# Patient Record
Sex: MALE | Race: WHITE | NOT HISPANIC OR LATINO
[De-identification: names, ages, dates, MRNs, and addresses within clinical notes are randomized per-mention and may not be internally consistent; named-entity substitution may affect disease eponyms.]

---

## 2019-07-22 ENCOUNTER — APPOINTMENT (OUTPATIENT)
Dept: CARDIOLOGY | Facility: CLINIC | Age: 84
End: 2019-07-22
Payer: MEDICARE

## 2019-07-22 PROCEDURE — 99213 OFFICE O/P EST LOW 20 MIN: CPT | Mod: 25

## 2019-07-22 PROCEDURE — 93284 PRGRMG EVAL IMPLANTABLE DFB: CPT | Mod: 59

## 2019-07-22 PROCEDURE — 93290 INTERROG DEV EVAL ICPMS IP: CPT | Mod: 59

## 2019-10-28 ENCOUNTER — APPOINTMENT (OUTPATIENT)
Dept: CARDIOLOGY | Facility: CLINIC | Age: 84
End: 2019-10-28
Payer: MEDICARE

## 2019-10-28 PROCEDURE — 99213 OFFICE O/P EST LOW 20 MIN: CPT | Mod: 25

## 2019-10-28 PROCEDURE — 93290 INTERROG DEV EVAL ICPMS IP: CPT | Mod: 59

## 2019-10-28 PROCEDURE — 93284 PRGRMG EVAL IMPLANTABLE DFB: CPT | Mod: 59

## 2020-02-24 ENCOUNTER — APPOINTMENT (OUTPATIENT)
Dept: CARDIOLOGY | Facility: CLINIC | Age: 85
End: 2020-02-24
Payer: MEDICARE

## 2020-02-24 PROCEDURE — 93284 PRGRMG EVAL IMPLANTABLE DFB: CPT | Mod: 59

## 2020-02-24 PROCEDURE — 99213 OFFICE O/P EST LOW 20 MIN: CPT | Mod: 25

## 2020-02-24 PROCEDURE — 93290 INTERROG DEV EVAL ICPMS IP: CPT | Mod: 59

## 2020-07-23 ENCOUNTER — RECORD ABSTRACTING (OUTPATIENT)
Age: 85
End: 2020-07-23

## 2020-08-05 ENCOUNTER — APPOINTMENT (OUTPATIENT)
Dept: CARDIOLOGY | Facility: CLINIC | Age: 85
End: 2020-08-05
Payer: MEDICARE

## 2020-08-05 VITALS
BODY MASS INDEX: 24.87 KG/M2 | DIASTOLIC BLOOD PRESSURE: 60 MMHG | HEART RATE: 76 BPM | WEIGHT: 200 LBS | SYSTOLIC BLOOD PRESSURE: 140 MMHG | TEMPERATURE: 98.4 F | HEIGHT: 75 IN

## 2020-08-05 DIAGNOSIS — Z86.79 PERSONAL HISTORY OF OTHER DISEASES OF THE CIRCULATORY SYSTEM: ICD-10-CM

## 2020-08-05 DIAGNOSIS — I25.2 OLD MYOCARDIAL INFARCTION: ICD-10-CM

## 2020-08-05 DIAGNOSIS — Z00.00 ENCOUNTER FOR GENERAL ADULT MEDICAL EXAMINATION W/OUT ABNORMAL FINDINGS: ICD-10-CM

## 2020-08-05 DIAGNOSIS — T82.897A OTHER SPECIFIED COMPLICATION OF CARDIAC PROSTHETIC DEVICES, IMPLANTS AND GRAFTS, INITIAL ENCOUNTER: ICD-10-CM

## 2020-08-05 PROCEDURE — 99214 OFFICE O/P EST MOD 30 MIN: CPT

## 2020-08-05 PROCEDURE — 93000 ELECTROCARDIOGRAM COMPLETE: CPT

## 2020-08-05 NOTE — ASSESSMENT
[FreeTextEntry1] : NO EVENTS BATTERY OK\par SKIN SUPERFICIAL LESION\par EKG SR BIV PACING\par PLAN   CONTINUE  SAME MEDS\par REFERRED TO DERMATOLOGIST

## 2020-08-05 NOTE — PROCEDURE
[CRT-D] : Cardiac resynchronization therapy defibrillator [Complete Heart Block] : complete heart block [DDD] : DDD [Longevity: ___ months] : The estimated remaining battery life is [unfilled] months [Lead Imp:  ___ohms] : lead impedance was [unfilled] ohms [___V @] : [unfilled] V [___ ms] : [unfilled] ms [Sensing Amplitude ___mv] : sensing amplitude was [unfilled] mv [Asense-Vpace ___ %] : Asense-Vpace [unfilled]% [Asense-Vsense ___ %] : Asense-Vsense [unfilled]% [Apace-Vsense ___ %] : Apace-Vsense [unfilled]% [Apace-Vpace ___ %] : Apace-Vpace [unfilled]% [de-identified] : MEDTRONIC [de-identified] : VIVA [de-identified] : [id number] [de-identified] : 6-8-16 [de-identified] : no events [de-identified] : 70

## 2020-08-05 NOTE — HISTORY OF PRESENT ILLNESS
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 DENIES ANY DYSPNEA OR CHEST PAIN 6/1/2019\par \par no cardiac c/o   8/5/20\par

## 2020-08-05 NOTE — PHYSICAL EXAM
[General Appearance - Well Developed] : well developed [Normal Appearance] : normal appearance [General Appearance - Well Nourished] : well nourished [No Deformities] : no deformities [Well Groomed] : well groomed [Heart Rate And Rhythm] : heart rate and rhythm were normal [General Appearance - In No Acute Distress] : no acute distress [Murmurs] : no murmurs present [Heart Sounds] : normal S1 and S2 [Respiration, Rhythm And Depth] : normal respiratory rhythm and effort [Auscultation Breath Sounds / Voice Sounds] : lungs were clear to auscultation bilaterally [Exaggerated Use Of Accessory Muscles For Inspiration] : no accessory muscle use [Abdomen Tenderness] : non-tender [Abdomen Soft] : soft [FreeTextEntry1] : superficial abrasion  at l ifraclavicular area as a result of scab [Cyanosis, Localized] : no localized cyanosis [Abdomen Mass (___ Cm)] : no abdominal mass palpated [Nail Clubbing] : no clubbing of the fingernails [Petechial Hemorrhages (___cm)] : no petechial hemorrhages [] : no ischemic changes

## 2020-12-14 ENCOUNTER — APPOINTMENT (OUTPATIENT)
Dept: CARDIOLOGY | Facility: CLINIC | Age: 85
End: 2020-12-14
Payer: MEDICARE

## 2020-12-14 VITALS
SYSTOLIC BLOOD PRESSURE: 120 MMHG | HEART RATE: 70 BPM | WEIGHT: 195 LBS | DIASTOLIC BLOOD PRESSURE: 60 MMHG | BODY MASS INDEX: 24.25 KG/M2 | TEMPERATURE: 98.1 F | HEIGHT: 75 IN

## 2020-12-14 DIAGNOSIS — Z86.79 PERSONAL HISTORY OF OTHER DISEASES OF THE CIRCULATORY SYSTEM: ICD-10-CM

## 2020-12-14 PROCEDURE — 93000 ELECTROCARDIOGRAM COMPLETE: CPT | Mod: 59

## 2020-12-14 PROCEDURE — 93284 PRGRMG EVAL IMPLANTABLE DFB: CPT

## 2020-12-14 PROCEDURE — 99213 OFFICE O/P EST LOW 20 MIN: CPT

## 2020-12-14 PROCEDURE — 93290 INTERROG DEV EVAL ICPMS IP: CPT | Mod: 26

## 2020-12-14 NOTE — PROCEDURE
[Complete Heart Block] : complete heart block [CRT-D] : Cardiac resynchronization therapy defibrillator [DDD] : DDD [Voltage: ___ volts] : Voltage was [unfilled] volts [Charge Time: ___ sec] : charge time was [unfilled] seconds [Longevity: ___ months] : The estimated remaining battery life is [unfilled] months [Sensing Amplitude ___mv] : sensing amplitude was [unfilled] mv [Lead Imp:  ___ohms] : lead impedance was [unfilled] ohms [___V @] : [unfilled] V [___ ms] : [unfilled] ms [Asense-Vsense ___ %] : Asense-Vsense [unfilled]% [Asense-Vpace ___ %] : Asense-Vpace [unfilled]% [Apace-Vsense ___ %] : Apace-Vsense [unfilled]% [Apace-Vpace ___ %] : Apace-Vpace [unfilled]% [See Scanned Paceart Report] : See scanned paceart report [See Device Printout] : See device printout [Programmed for Longevity] : output reprogrammed for improved battery longevity [de-identified] : MEDTRONIC [de-identified] : [id number] [de-identified] : [id number] [de-identified] : 6/8/2016 [de-identified] : 70 [de-identified] : no events\par Stable Optivol

## 2020-12-14 NOTE — ASSESSMENT
[FreeTextEntry1] : Mr. Leslie has a history of CAD s/p CABG, sustained VT on Amio, ICM s/p CRT-D here for routine device follow up. \par \par ICM\par - CRT-D with normal function and no arrhythmias. Stable Optivol. \par - Check 2D echo\par - will enroll in remote with me\par \par History of Sustained VT\par - On Amio. Check routine labs. Ophtho referral. Has a pulmonologist. \par \par CAD \par - Cont GDMT\par \par Skin Lesions\par - Derm referral. \par \par HTN\par - BP well controlled\par - 2g Na diet enforced\par \par I have also advised the patient to go to the nearest emergency room if he experiences any chest pain, dyspnea, syncope, or has any other compelling symptoms.\par \par Follow up in 9 months

## 2020-12-14 NOTE — PHYSICAL EXAM
[General Appearance - Well Developed] : well developed [Normal Appearance] : normal appearance [Well Groomed] : well groomed [General Appearance - Well Nourished] : well nourished [No Deformities] : no deformities [General Appearance - In No Acute Distress] : no acute distress [Heart Rate And Rhythm] : heart rate and rhythm were normal [Heart Sounds] : normal S1 and S2 [Murmurs] : no murmurs present [Edema] : no peripheral edema present [] : no respiratory distress [Respiration, Rhythm And Depth] : normal respiratory rhythm and effort [Exaggerated Use Of Accessory Muscles For Inspiration] : no accessory muscle use [Auscultation Breath Sounds / Voice Sounds] : lungs were clear to auscultation bilaterally [Left Infraclavicular] : left infraclavicular area [Well-Healed] : well-healed [FreeTextEntry2] : with a superficial skin lesion overlying the distal aspect of the incision [Abdomen Soft] : soft [Nail Clubbing] : no clubbing of the fingernails

## 2020-12-14 NOTE — HISTORY OF PRESENT ILLNESS
[de-identified] : \par 89 yo M with history of CAD s/p 4v CABG (2015), PCI, DM, HTN, sustained VT on 3/16/17 s/p DC ICD (3/17/17) for secondary prevention and right CVA (June 2017) who presents for routine follow up of ICD. Of note, pt lost his wife to liver ca in 2019. He has not followed up with dermatologist due to inability to get a ride without his daughter.

## 2020-12-15 LAB
ALBUMIN SERPL ELPH-MCNC: 3.4 G/DL
ALP BLD-CCNC: 54 U/L
ALT SERPL-CCNC: 7 U/L
ANION GAP SERPL CALC-SCNC: 12 MMOL/L
AST SERPL-CCNC: 13 U/L
BILIRUB SERPL-MCNC: 0.7 MG/DL
BUN SERPL-MCNC: 22 MG/DL
CALCIUM SERPL-MCNC: 9.1 MG/DL
CHLORIDE SERPL-SCNC: 106 MMOL/L
CO2 SERPL-SCNC: 22 MMOL/L
CREAT SERPL-MCNC: 1.9 MG/DL
GLUCOSE SERPL-MCNC: 98 MG/DL
POTASSIUM SERPL-SCNC: 4.7 MMOL/L
PROT SERPL-MCNC: 7.4 G/DL
SODIUM SERPL-SCNC: 140 MMOL/L
T4 FREE SERPL-MCNC: 1.1 NG/DL
TSH SERPL-ACNC: 5.24 UIU/ML

## 2020-12-28 ENCOUNTER — APPOINTMENT (OUTPATIENT)
Dept: CARDIOLOGY | Facility: CLINIC | Age: 85
End: 2020-12-28

## 2021-01-01 ENCOUNTER — APPOINTMENT (OUTPATIENT)
Dept: CARDIOLOGY | Facility: CLINIC | Age: 86
End: 2021-01-01

## 2021-01-01 ENCOUNTER — FORM ENCOUNTER (OUTPATIENT)
Age: 86
End: 2021-01-01

## 2021-03-16 ENCOUNTER — APPOINTMENT (OUTPATIENT)
Dept: CARDIOLOGY | Facility: CLINIC | Age: 86
End: 2021-03-16
Payer: MEDICARE

## 2021-03-16 ENCOUNTER — NON-APPOINTMENT (OUTPATIENT)
Age: 86
End: 2021-03-16

## 2021-03-16 PROCEDURE — 93296 REM INTERROG EVL PM/IDS: CPT

## 2021-03-16 PROCEDURE — 93295 DEV INTERROG REMOTE 1/2/MLT: CPT

## 2021-05-05 ENCOUNTER — APPOINTMENT (OUTPATIENT)
Dept: UROLOGY | Facility: CLINIC | Age: 86
End: 2021-05-05
Payer: MEDICARE

## 2021-05-05 VITALS
SYSTOLIC BLOOD PRESSURE: 148 MMHG | TEMPERATURE: 98.2 F | DIASTOLIC BLOOD PRESSURE: 73 MMHG | BODY MASS INDEX: 23.62 KG/M2 | HEART RATE: 70 BPM | HEIGHT: 75 IN | WEIGHT: 190 LBS

## 2021-05-05 DIAGNOSIS — Z80.8 FAMILY HISTORY OF MALIGNANT NEOPLASM OF OTHER ORGANS OR SYSTEMS: ICD-10-CM

## 2021-05-05 DIAGNOSIS — Z82.49 FAMILY HISTORY OF ISCHEMIC HEART DISEASE AND OTHER DISEASES OF THE CIRCULATORY SYSTEM: ICD-10-CM

## 2021-05-05 DIAGNOSIS — R39.9 UNSPECIFIED SYMPTOMS AND SIGNS INVOLVING THE GENITOURINARY SYSTEM: ICD-10-CM

## 2021-05-05 DIAGNOSIS — N13.8 BENIGN PROSTATIC HYPERPLASIA WITH LOWER URINARY TRACT SYMPMS: ICD-10-CM

## 2021-05-05 DIAGNOSIS — N40.1 BENIGN PROSTATIC HYPERPLASIA WITH LOWER URINARY TRACT SYMPMS: ICD-10-CM

## 2021-05-05 PROCEDURE — 99204 OFFICE O/P NEW MOD 45 MIN: CPT

## 2021-05-05 RX ORDER — SIMVASTATIN 10 MG/1
10 TABLET, FILM COATED ORAL
Qty: 30 | Refills: 5 | Status: COMPLETED | COMMUNITY
End: 2021-05-05

## 2021-05-05 NOTE — PHYSICAL EXAM
[General Appearance - Well Developed] : well developed [Normal Appearance] : normal appearance [Well Groomed] : well groomed [General Appearance - In No Acute Distress] : no acute distress [Edema] : no peripheral edema [Respiration, Rhythm And Depth] : normal respiratory rhythm and effort [Exaggerated Use Of Accessory Muscles For Inspiration] : no accessory muscle use [Abdomen Soft] : soft [Abdomen Tenderness] : non-tender [Costovertebral Angle Tenderness] : no ~M costovertebral angle tenderness [Urethral Meatus] : meatus normal [Urinary Bladder Findings] : the bladder was normal on palpation [Scrotum] : the scrotum was normal [Testes Mass (___cm)] : there were no testicular masses [Testes Tenderness] : no tenderness of the testes [Prostate Tenderness] : the prostate was not tender [No Prostate Nodules] : no prostate nodules [Prostate Size ___ gm] : prostate size [unfilled] gm [Normal Station and Gait] : the gait and station were normal for the patient's age [] : no rash [No Focal Deficits] : no focal deficits [Oriented To Time, Place, And Person] : oriented to person, place, and time [Affect] : the affect was normal [Not Anxious] : not anxious [Mood] : the mood was normal [No Palpable Adenopathy] : no palpable adenopathy [FreeTextEntry1] : sulcus+

## 2021-05-05 NOTE — ASSESSMENT
[FreeTextEntry1] : #1. Elevated PSA= 16.1\par #2. BPH, clinically\par \par Plan\par -Discussed PSA testing in this age group including its physiology in relationship to age and BPH. We had knowledge the increased risk for the presence of prostate cancer based on this PSA value. We also discussed the options of MP MRI, prostate biopsy, surveillance PSA or no further intervention. Concerning these choices we discussed the risks, benefits, alternatives, possible complications of each, including progression of undetected prostate carcinoma specifically in his age group.\par -Understanding all of the above the patient and daughter have elected not to proceed in any manner.

## 2021-05-05 NOTE — HISTORY OF PRESENT ILLNESS
[Urinary Frequency] : urinary frequency [Nocturia] : nocturia [Post-Void Dribbling] : post-void dribbling [None] : None [FreeTextEntry1] : 89-year-old male here with his daughter  for initial evaluation regarding elevated PSA of 16.1. Patient was sent by PCP. He reports mild voiding symptoms. He denies hematuria or constitutional symptoms. The patient and daughter have no knowledge of prior PSA testing all values. No family history of prostate carcinoma.\par \par 1/21 PSA= 16.1 [Urinary Urgency] : no urinary urgency [Weak Stream] : no weak stream [Dysuria] : no dysuria [Hematuria - Gross] : no gross hematuria [Fever] : no fever

## 2021-05-05 NOTE — LETTER BODY
[Dear  ___] : Dear  [unfilled], [Consult Letter:] : I had the pleasure of evaluating your patient, [unfilled]. [( Thank you for referring [unfilled] for consultation for _____ )] : Thank you for referring [unfilled] for consultation for [unfilled] [Consult Closing:] : Thank you very much for allowing me to participate in the care of this patient.  If you have any questions, please do not hesitate to contact me. [FreeTextEntry1] : This is to summarize the consultation for Aneudy Anuj HERNANDEZ May 5, 2021.\par \par Impression= elevated PSA= 18.1 . BPH, clinically. We discussed the relationship between PSA testing in this age group which in general should not be performed however the present value indicates a risk for prostate carcinoma. We also discussed the options of aggressive interventions such as MP MRI, prostate biopsy or surveillance or no further urologic action. We have knowledge the risks, benefits, complications, rationale for undiagnosed prostate carcinoma in this age group. Urologically we do not advise proceeding in any manner unless dictated by the family.\par \par Plan= daughter and patient have decided not to move forward with any further action.

## 2021-05-25 ENCOUNTER — RX RENEWAL (OUTPATIENT)
Age: 86
End: 2021-05-25

## 2021-06-17 ENCOUNTER — NON-APPOINTMENT (OUTPATIENT)
Age: 86
End: 2021-06-17

## 2021-06-17 ENCOUNTER — APPOINTMENT (OUTPATIENT)
Dept: CARDIOLOGY | Facility: CLINIC | Age: 86
End: 2021-06-17
Payer: MEDICARE

## 2021-06-17 PROCEDURE — 93295 DEV INTERROG REMOTE 1/2/MLT: CPT

## 2021-06-17 PROCEDURE — 93296 REM INTERROG EVL PM/IDS: CPT

## 2021-06-18 ENCOUNTER — NON-APPOINTMENT (OUTPATIENT)
Age: 86
End: 2021-06-18

## 2021-06-21 ENCOUNTER — APPOINTMENT (OUTPATIENT)
Dept: CARDIOLOGY | Facility: CLINIC | Age: 86
End: 2021-06-21
Payer: MEDICARE

## 2021-06-21 VITALS
SYSTOLIC BLOOD PRESSURE: 160 MMHG | DIASTOLIC BLOOD PRESSURE: 83 MMHG | TEMPERATURE: 97.8 F | WEIGHT: 193 LBS | HEART RATE: 70 BPM | OXYGEN SATURATION: 98 % | HEIGHT: 75 IN | RESPIRATION RATE: 16 BRPM | BODY MASS INDEX: 24 KG/M2

## 2021-06-21 PROCEDURE — 93290 INTERROG DEV EVAL ICPMS IP: CPT

## 2021-06-21 PROCEDURE — 93000 ELECTROCARDIOGRAM COMPLETE: CPT | Mod: 59

## 2021-06-21 PROCEDURE — 93284 PRGRMG EVAL IMPLANTABLE DFB: CPT

## 2021-06-21 PROCEDURE — 99214 OFFICE O/P EST MOD 30 MIN: CPT

## 2021-06-21 RX ORDER — PNV NO.95/FERROUS FUM/FOLIC AC 28MG-0.8MG
100 TABLET ORAL DAILY
Refills: 0 | Status: ACTIVE | COMMUNITY

## 2021-06-21 RX ORDER — ASPIRIN 81 MG
81 TABLET, DELAYED RELEASE (ENTERIC COATED) ORAL DAILY
Refills: 0 | Status: ACTIVE | COMMUNITY

## 2021-06-21 RX ORDER — AMLODIPINE BESYLATE 5 MG/1
5 TABLET ORAL DAILY
Refills: 0 | Status: DISCONTINUED | COMMUNITY
End: 2021-06-21

## 2021-06-21 RX ORDER — SIMVASTATIN 10 MG/1
10 TABLET, FILM COATED ORAL DAILY
Qty: 30 | Refills: 5 | Status: DISCONTINUED | COMMUNITY
End: 2021-06-21

## 2021-06-21 NOTE — REASON FOR VISIT
[___ Device Check] : is here today for a [unfilled] device check for [Other ___] : [unfilled] [Family Member] : family member

## 2021-06-22 LAB
ALBUMIN SERPL ELPH-MCNC: 3.7 G/DL
ALP BLD-CCNC: 70 U/L
ALT SERPL-CCNC: 8 U/L
ANION GAP SERPL CALC-SCNC: 10 MMOL/L
AST SERPL-CCNC: 13 U/L
BASOPHILS # BLD AUTO: 0.08 K/UL
BASOPHILS NFR BLD AUTO: 1.2 %
BILIRUB SERPL-MCNC: 0.4 MG/DL
BUN SERPL-MCNC: 30 MG/DL
CALCIUM SERPL-MCNC: 9.2 MG/DL
CHLORIDE SERPL-SCNC: 105 MMOL/L
CO2 SERPL-SCNC: 25 MMOL/L
CREAT SERPL-MCNC: 1.7 MG/DL
EOSINOPHIL # BLD AUTO: 0.79 K/UL
EOSINOPHIL NFR BLD AUTO: 11.6 %
GLUCOSE SERPL-MCNC: 98 MG/DL
HCT VFR BLD CALC: 32.7 %
HGB BLD-MCNC: 10.2 G/DL
IMM GRANULOCYTES NFR BLD AUTO: 0.1 %
LYMPHOCYTES # BLD AUTO: 2.31 K/UL
LYMPHOCYTES NFR BLD AUTO: 33.9 %
MAN DIFF?: NORMAL
MCHC RBC-ENTMCNC: 31.2 G/DL
MCHC RBC-ENTMCNC: 32.9 PG
MCV RBC AUTO: 105.5 FL
MONOCYTES # BLD AUTO: 1.07 K/UL
MONOCYTES NFR BLD AUTO: 15.7 %
NEUTROPHILS # BLD AUTO: 2.56 K/UL
NEUTROPHILS NFR BLD AUTO: 37.5 %
PLATELET # BLD AUTO: 280 K/UL
POTASSIUM SERPL-SCNC: 5 MMOL/L
PROT SERPL-MCNC: 8.1 G/DL
RBC # BLD: 3.1 M/UL
RBC # FLD: 14.1 %
SODIUM SERPL-SCNC: 140 MMOL/L
T4 FREE SERPL-MCNC: 1.1 NG/DL
TSH SERPL-ACNC: 7.37 UIU/ML
WBC # FLD AUTO: 6.82 K/UL

## 2021-06-24 NOTE — ASSESSMENT
[FreeTextEntry1] : Mr. Leslie has a history of CAD s/p CABG, sustained VT on Amio, ICM s/p CRT-D here for device follow up after finding atrial fibrillation on remote transmission. \par \par ICM\par - CRT-D with normal function. Stable Optivol. Turned on rate response as patient said he feels SOB when going upstairs, histograms without good variability.\par \par History of Sustained VT\par - On Amio. Creat 1.7, LFTs WNL. TSH elevated, T4 normal. Advised patient to follow up with PMD Dr. Mujica and copy of labs sent to her office.\par \par CAD \par - Cont GDMT\par \par PAF\par - Discussed case with Dr. Combs's office who knows patient well. No AC at this time. Patient has history of afib on coumadin with fall and SDH. We will continue to monitor. Discussed risks/benefits with patient and daughter. They are in agreement to continue to hold off on AC as risks outweigh the benefits. \par \par Follow up as scheduled with Dr. Combs and Dr. River.

## 2021-06-24 NOTE — HISTORY OF PRESENT ILLNESS
[de-identified] : Cardiologist: Dr Combs\par \par 89 yo M with history of CAD s/p 4v CABG (2003), PCI, ICM, DM, HTN, CKD III, HLD, lymphoma, PAF not on AC due to SDH after fall on coumadin, DC PPM in 2005 for CHB with upgrade to BiV ICD in 2010 for primary prevention?, last generator change on 7/8/2016, who presents for follow up of BiV ICD after finding afib on remote transmission. \par \par Cardiac testing:\par ECG (6/21/21) Sinus rhythm at 69 bpm, BiV Paced, Qtc 508ms

## 2021-06-24 NOTE — PHYSICAL EXAM
[General Appearance - Well Developed] : well developed [Normal Appearance] : normal appearance [Well Groomed] : well groomed [General Appearance - Well Nourished] : well nourished [No Deformities] : no deformities [General Appearance - In No Acute Distress] : no acute distress [Heart Rate And Rhythm] : heart rate and rhythm were normal [Heart Sounds] : normal S1 and S2 [Murmurs] : no murmurs present [Edema] : no peripheral edema present [] : no respiratory distress [Respiration, Rhythm And Depth] : normal respiratory rhythm and effort [Exaggerated Use Of Accessory Muscles For Inspiration] : no accessory muscle use [Auscultation Breath Sounds / Voice Sounds] : lungs were clear to auscultation bilaterally [Left Infraclavicular] : left infraclavicular area [Well-Healed] : well-healed [Abdomen Soft] : soft [Nail Clubbing] : no clubbing of the fingernails [FreeTextEntry2] : with a superficial skin lesion overlying the distal aspect of the incision

## 2021-06-24 NOTE — PROCEDURE
[Complete Heart Block] : complete heart block [CRT-D] : Cardiac resynchronization therapy defibrillator [DDD] : DDD [Voltage: ___ volts] : Voltage was [unfilled] volts [Longevity: ___ months] : The estimated remaining battery life is [unfilled] months [Threshold Testing Performed] : Threshold testing was performed [Sensing Amplitude ___mv] : sensing amplitude was [unfilled] mv [Lead Imp:  ___ohms] : lead impedance was [unfilled] ohms [___V @] : [unfilled] V [___ ms] : [unfilled] ms [Programmed for Longevity] : output reprogrammed for improved battery longevity [Asense-Vsense ___ %] : Asense-Vsense [unfilled]% [Asense-Vpace ___ %] : Asense-Vpace [unfilled]% [Apace-Vsense ___ %] : Apace-Vsense [unfilled]% [Apace-Vpace ___ %] : Apace-Vpace [unfilled]% [de-identified] : Medtronic [de-identified] : [id number] [de-identified] : [id number] [de-identified] : 7/8/2016 [de-identified] : 70 [de-identified] : Turned on rate response.\par 1 AT/AF episode lasting 35 minutes.\par Optivol below threshold

## 2021-07-12 ENCOUNTER — APPOINTMENT (OUTPATIENT)
Dept: CARDIOLOGY | Facility: CLINIC | Age: 86
End: 2021-07-12

## 2021-07-14 PROBLEM — Z85.828 HISTORY OF SQUAMOUS CELL CARCINOMA OF SKIN: Status: RESOLVED | Noted: 2021-07-14 | Resolved: 2021-07-14

## 2021-07-14 PROBLEM — R60.9 EDEMA, PERIPHERAL: Status: ACTIVE | Noted: 2021-07-14

## 2021-07-14 PROBLEM — Z63.4 WIDOW: Status: ACTIVE | Noted: 2021-07-14

## 2021-07-15 ENCOUNTER — APPOINTMENT (OUTPATIENT)
Dept: CARDIOLOGY | Facility: CLINIC | Age: 86
End: 2021-07-15
Payer: MEDICARE

## 2021-07-15 VITALS
BODY MASS INDEX: 23.5 KG/M2 | SYSTOLIC BLOOD PRESSURE: 110 MMHG | HEIGHT: 76 IN | WEIGHT: 193 LBS | DIASTOLIC BLOOD PRESSURE: 60 MMHG

## 2021-07-15 DIAGNOSIS — Z63.4 DISAPPEARANCE AND DEATH OF FAMILY MEMBER: ICD-10-CM

## 2021-07-15 DIAGNOSIS — Z85.828 PERSONAL HISTORY OF OTHER MALIGNANT NEOPLASM OF SKIN: ICD-10-CM

## 2021-07-15 DIAGNOSIS — R60.9 EDEMA, UNSPECIFIED: ICD-10-CM

## 2021-07-15 PROCEDURE — 99214 OFFICE O/P EST MOD 30 MIN: CPT

## 2021-07-15 SDOH — SOCIAL STABILITY - SOCIAL INSECURITY: DISSAPEARANCE AND DEATH OF FAMILY MEMBER: Z63.4

## 2021-07-15 NOTE — REVIEW OF SYSTEMS
[Hearing Loss] : hearing loss [Skin Lesions] : skin lesion(s): [Fever] : no fever [Blurry Vision] : no blurred vision [SOB] : no shortness of breath [Chest Discomfort] : no chest discomfort [Palpitations] : no palpitations [Cough] : no cough [Wheezing] : no wheezing [Abdominal Pain] : no abdominal pain [Diarrhea] : diarrhea [Constipation] : no constipation [Pain During Urination] : no dysuria [Joint Pain] : no joint pain [Myalgia] : no myalgia [Rash] : no rash [Dizziness] : no dizziness [Weakness] : no weakness [Confusion] : no confusion was observed [Easy Bleeding] : no tendency for easy bleeding [de-identified] : sees derm

## 2021-07-15 NOTE — DISCUSSION/SUMMARY
[FreeTextEntry1] : Pt aware need low salt 1500 mg Na diet. Told to inform us if gained more wt and increased SOB. H/O PAF not on anticoagulation. Pt at risk to fall .His ICD has not fired. His  EF  is 55% . Had non dx DSE 5/15.  Possible Persantine soon. Wants to wait for now. Echo 5/18 aorta 3.8cm EF 55% MILD MR. Told watch food w salt. Gets food from meals on wheels . Amiodarone decreased by Bekeit to 100mg  7 days/wk. She also decreased ACE. Still at times lightheaded. But much less. Not when get up. no exercise. Edema legs resolved. FIGUEROA stable.  No change wt. Echo 1/21 EF 50% MOD MR Not drive. Lives Alone. He cares for self. Told try walk. He got both covid vaccines. Blood done by pmd june 2021. Got  iron infusions. he got 5. To get follow up bloods.

## 2021-07-15 NOTE — HISTORY OF PRESENT ILLNESS
[FreeTextEntry1] : Patient with escudero. No sob. No Cp. No palpitations. AICD  not fire.  No fever, rash, or recent illness.  No joint pain/swelling/stiffness.  No eye pain/redness/change in vision.  No sores in the mouth or nose.  No difficulty swallowing.  No chest pain or shortness of breath.  No abdominal complaints or weight loss.  No weakness.  No headaches or focal neurological deficits.  No urinary change

## 2021-08-04 ENCOUNTER — OUTPATIENT (OUTPATIENT)
Dept: OUTPATIENT SERVICES | Facility: HOSPITAL | Age: 86
LOS: 1 days | Discharge: HOME | End: 2021-08-04
Payer: MEDICARE

## 2021-08-04 ENCOUNTER — RESULT REVIEW (OUTPATIENT)
Age: 86
End: 2021-08-04

## 2021-08-04 ENCOUNTER — TRANSCRIPTION ENCOUNTER (OUTPATIENT)
Age: 86
End: 2021-08-04

## 2021-08-04 VITALS
OXYGEN SATURATION: 98 % | HEART RATE: 70 BPM | TEMPERATURE: 98 F | RESPIRATION RATE: 19 BRPM | SYSTOLIC BLOOD PRESSURE: 147 MMHG | DIASTOLIC BLOOD PRESSURE: 67 MMHG

## 2021-08-04 VITALS — WEIGHT: 192.9 LBS | HEIGHT: 76 IN

## 2021-08-04 DIAGNOSIS — Z80.8 FAMILY HISTORY OF MALIGNANT NEOPLASM OF OTHER ORGANS OR SYSTEMS: Chronic | ICD-10-CM

## 2021-08-04 DIAGNOSIS — Z98.890 OTHER SPECIFIED POSTPROCEDURAL STATES: Chronic | ICD-10-CM

## 2021-08-04 PROCEDURE — 88312 SPECIAL STAINS GROUP 1: CPT | Mod: 26

## 2021-08-04 PROCEDURE — 88305 TISSUE EXAM BY PATHOLOGIST: CPT | Mod: 26

## 2021-08-04 NOTE — ASU PATIENT PROFILE, ADULT - MENTAL HEALTH CONDITIONS/SYMPTOMS, PROFILE
-recent lipid profile showed TC 79 mg/dl  -decrease Atorvastatin to 40 mg PO QD  -repeat labs in 4-6 weeks   none

## 2021-08-04 NOTE — CHART NOTE - NSCHARTNOTEFT_GEN_A_CORE
PACU ANESTHESIA ADMISSION NOTE      Procedure:   Post op diagnosis:      ____  Intubated  TV:______       Rate: ______      FiO2: ______    __x__  Patent Airway    __x__  Full return of protective reflexes    __x__  Full recovery from anesthesia / back to baseline     Vitals:   T:  36         R: 18                 BP: 135/74                 Sat: 99                  P: 69      Mental Status:  __x__ Awake   ___x__ Alert   _____ Drowsy   _____ Sedated    Nausea/Vomiting:  _x___ NO  ______Yes,   See Post - Op Orders          Pain Scale (0-10):  _____    Treatment: __x__ None    ____ See Post - Op/PCA Orders    Post - Operative Fluids:   ____ Oral   ___x_ See Post - Op Orders    Plan: Discharge:   __x__Home       _____Floor     _____Critical Care    _____  Other:_________________    Comments:    Uneventful anesthesia. Patient transported to  spontaneously breathing and hemodynamically stable.

## 2021-08-06 LAB — SURGICAL PATHOLOGY STUDY: SIGNIFICANT CHANGE UP

## 2021-08-09 DIAGNOSIS — K29.50 UNSPECIFIED CHRONIC GASTRITIS WITHOUT BLEEDING: ICD-10-CM

## 2021-08-09 DIAGNOSIS — K31.9 DISEASE OF STOMACH AND DUODENUM, UNSPECIFIED: ICD-10-CM

## 2021-08-09 DIAGNOSIS — K29.80 DUODENITIS WITHOUT BLEEDING: ICD-10-CM

## 2021-08-09 DIAGNOSIS — D50.9 IRON DEFICIENCY ANEMIA, UNSPECIFIED: ICD-10-CM

## 2021-08-09 DIAGNOSIS — K44.9 DIAPHRAGMATIC HERNIA WITHOUT OBSTRUCTION OR GANGRENE: ICD-10-CM

## 2021-09-13 ENCOUNTER — APPOINTMENT (OUTPATIENT)
Dept: CARDIOLOGY | Facility: CLINIC | Age: 86
End: 2021-09-13
Payer: MEDICARE

## 2021-09-13 VITALS
BODY MASS INDEX: 22.77 KG/M2 | TEMPERATURE: 98.1 F | SYSTOLIC BLOOD PRESSURE: 110 MMHG | HEART RATE: 72 BPM | DIASTOLIC BLOOD PRESSURE: 58 MMHG | WEIGHT: 187 LBS | HEIGHT: 76 IN

## 2021-09-13 DIAGNOSIS — Z45.02 ENCOUNTER FOR ADJUSTMENT AND MANAGEMENT OF AUTOMATIC IMPLANTABLE CARDIAC DEFIBRILLATOR: ICD-10-CM

## 2021-09-13 PROBLEM — I51.9 HEART DISEASE, UNSPECIFIED: Chronic | Status: ACTIVE | Noted: 2021-08-04

## 2021-09-13 PROBLEM — I10 ESSENTIAL (PRIMARY) HYPERTENSION: Chronic | Status: ACTIVE | Noted: 2021-08-04

## 2021-09-13 PROBLEM — C44.90 UNSPECIFIED MALIGNANT NEOPLASM OF SKIN, UNSPECIFIED: Chronic | Status: ACTIVE | Noted: 2021-08-04

## 2021-09-13 PROCEDURE — 93000 ELECTROCARDIOGRAM COMPLETE: CPT | Mod: 59

## 2021-09-13 PROCEDURE — 93290 INTERROG DEV EVAL ICPMS IP: CPT | Mod: 26

## 2021-09-13 PROCEDURE — 99214 OFFICE O/P EST MOD 30 MIN: CPT

## 2021-09-13 PROCEDURE — 93284 PRGRMG EVAL IMPLANTABLE DFB: CPT

## 2021-09-13 NOTE — ASSESSMENT
[FreeTextEntry1] : Mr. Leslie has a history of CAD s/p CABG, sustained VT on Amio, ICM s/p CRT-D here for routine device follow up. \par \par # ICM (EF 50%)\par - CRT-D with normal function and no arrhythmias. Stable Optivol. No clinical signs/symptoms of heart failure.\par - Remote monitor is set up and patient is transmitting. \par \par # History of Sustained VT\par - On Amio. Labs from June reviewed. LFTs normal. TSH high but free T4 normal. Cr 1.7.\par - Pulm and ophtho referrals provided.\par \par # PAF\par - Only one episode of AF for 35 min on 6/14/2021. CHADS VASc of at least 5 (CHF, HTN, Age > 75, CAD)\par - Case was discussed with Dr. Combs's office who know patient well back in June. No AC at this time due to history of AFib on Coumadin with fall and SDH. \par - Discussed option of Watchman but patient and daughter would like to discuss this with their cardiologist first. I informed them that if he can take short term duration of anticoagulation he would be a good candidate. \par \par # CAD \par - Cont GDMT including Benazapril and Labetalol\par \par # HTN\par - BP well controlled\par - 2g Na diet enforced\par \par I have also advised the patient to go to the nearest emergency room if he experiences any chest pain, dyspnea, syncope, or has any other compelling symptoms.\par \par Follow up in 6-9 months. \par \par

## 2021-09-13 NOTE — PHYSICAL EXAM
[General Appearance - Well Developed] : well developed [Normal Appearance] : normal appearance [Well Groomed] : well groomed [General Appearance - Well Nourished] : well nourished [No Deformities] : no deformities [General Appearance - In No Acute Distress] : no acute distress [Heart Rate And Rhythm] : heart rate and rhythm were normal [Heart Sounds] : normal S1 and S2 [Murmurs] : no murmurs present [Edema] : no peripheral edema present [] : no respiratory distress [Respiration, Rhythm And Depth] : normal respiratory rhythm and effort [Exaggerated Use Of Accessory Muscles For Inspiration] : no accessory muscle use [Auscultation Breath Sounds / Voice Sounds] : lungs were clear to auscultation bilaterally [Left Infraclavicular] : left infraclavicular area [Well-Healed] : well-healed [Abdomen Soft] : soft [Nail Clubbing] : no clubbing of the fingernails [Clean] : clean [Dry] : dry

## 2021-09-13 NOTE — HISTORY OF PRESENT ILLNESS
[de-identified] : \par Cardiologist: Dr. 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 Of note, pt lost his wife to liver ca in 2019. AF seen on remote in June. Case was discussed with Dr. Combs's office who know patient well. No AC at this time due to history of AFib on Coumadin with fall and SDH. \par \par He denies chest pain, palpitations, dyspnea, dizziness, lightheadedness, presyncope or syncope.

## 2021-09-13 NOTE — PROCEDURE
[Complete Heart Block] : complete heart block [CRT-D] : Cardiac resynchronization therapy defibrillator [DDD] : DDD [Voltage: ___ volts] : Voltage was [unfilled] volts [Charge Time: ___ sec] : charge time was [unfilled] seconds [Longevity: ___ months] : The estimated remaining battery life is [unfilled] months [Threshold Testing Performed] : Threshold testing was performed [Sensing Amplitude ___mv] : sensing amplitude was [unfilled] mv [Lead Imp:  ___ohms] : lead impedance was [unfilled] ohms [___V @] : [unfilled] V [___ ms] : [unfilled] ms [Programmed for Longevity] : output reprogrammed for improved battery longevity [Asense-Vsense ___ %] : Asense-Vsense [unfilled]% [Asense-Vpace ___ %] : Asense-Vpace [unfilled]% [Apace-Vsense ___ %] : Apace-Vsense [unfilled]% [Apace-Vpace ___ %] : Apace-Vpace [unfilled]% [See Device Printout] : See device printout [de-identified] : Medtronic [de-identified] : [id number] [de-identified] : [id number] [de-identified] : 7/8/2016 [de-identified] : 70 [de-identified] : No events.\par Fluid below threshold\par Total BiV P 98.3%

## 2021-10-14 ENCOUNTER — APPOINTMENT (OUTPATIENT)
Dept: CARDIOLOGY | Facility: CLINIC | Age: 86
End: 2021-10-14
Payer: MEDICARE

## 2021-10-14 VITALS
DIASTOLIC BLOOD PRESSURE: 70 MMHG | HEIGHT: 76 IN | WEIGHT: 193 LBS | BODY MASS INDEX: 23.5 KG/M2 | SYSTOLIC BLOOD PRESSURE: 132 MMHG

## 2021-10-14 DIAGNOSIS — R97.20 ELEVATED PROSTATE, SPECIFIC ANTIGEN [PSA]: ICD-10-CM

## 2021-10-14 DIAGNOSIS — Z23 ENCOUNTER FOR IMMUNIZATION: ICD-10-CM

## 2021-10-14 PROCEDURE — G0008: CPT

## 2021-10-14 PROCEDURE — 90662 IIV NO PRSV INCREASED AG IM: CPT

## 2021-10-14 PROCEDURE — 99214 OFFICE O/P EST MOD 30 MIN: CPT

## 2021-10-14 NOTE — HISTORY OF PRESENT ILLNESS
[FreeTextEntry1] : Patient with escudero.No change.  No sob. No Cp. No palpitations. AICD  not fire.  No fever, rash, or recent illness.  No joint pain/swelling/stiffness.  He does stretching exercises

## 2021-10-14 NOTE — REVIEW OF SYSTEMS
[Fever] : no fever [Blurry Vision] : no blurred vision [Hearing Loss] : hearing loss [SOB] : no shortness of breath [Chest Discomfort] : no chest discomfort [Palpitations] : no palpitations [Cough] : no cough [Wheezing] : no wheezing [Abdominal Pain] : no abdominal pain [Diarrhea] : diarrhea [Constipation] : no constipation [Pain During Urination] : no dysuria [Joint Pain] : no joint pain [Myalgia] : no myalgia [Rash] : no rash [Skin Lesions] : skin lesion(s): [Dizziness] : no dizziness [Weakness] : no weakness [Confusion] : no confusion was observed [Easy Bleeding] : no tendency for easy bleeding [de-identified] : sees derm

## 2021-10-14 NOTE — DISCUSSION/SUMMARY
[FreeTextEntry1] : Pt aware need low salt 1500 mg Na diet. Told to inform us if gained more wt and increased SOB. H/O PAF not on anticoagulation. Pt at risk to fall .His ICD has not fired. His  EF  is 55% . Had non dx DSE 5/15.  Possible Persantine soon. Wants to wait for now. Echo 5/18 aorta 3.8cm EF 55% MILD MR. Told watch food w salt. Gets food from meals on wheels . Amiodarone decreased by Bekeit to 100mg  7 days/wk. She also decreased ACE. Still at times lightheaded. But much less. . Edema legs resolved. FIGUEROA stable.  No change wt. Echo 1/21 EF 50% MOD MR Not drive. Lives Alone. He cares for self. Told try walk. He got both covid vaccines. Blood done by pmd june 2021. Got  iron infusions. he got 5. Repeat bloods' reportedly good. He gained 6 pounds. told watch weight

## 2022-01-01 ENCOUNTER — FORM ENCOUNTER (OUTPATIENT)
Age: 87
End: 2022-01-01

## 2022-01-01 ENCOUNTER — APPOINTMENT (OUTPATIENT)
Dept: ORTHOPEDIC SURGERY | Facility: CLINIC | Age: 87
End: 2022-01-01

## 2022-01-01 ENCOUNTER — OUTPATIENT (OUTPATIENT)
Dept: OUTPATIENT SERVICES | Facility: HOSPITAL | Age: 87
LOS: 1 days | Discharge: HOME | End: 2022-01-01

## 2022-01-01 ENCOUNTER — APPOINTMENT (OUTPATIENT)
Dept: CARDIOLOGY | Facility: CLINIC | Age: 87
End: 2022-01-01

## 2022-01-01 ENCOUNTER — APPOINTMENT (OUTPATIENT)
Dept: PLASTIC SURGERY | Facility: CLINIC | Age: 87
End: 2022-01-01

## 2022-01-01 ENCOUNTER — NON-APPOINTMENT (OUTPATIENT)
Age: 87
End: 2022-01-01

## 2022-01-01 ENCOUNTER — APPOINTMENT (OUTPATIENT)
Dept: PLASTIC SURGERY | Facility: AMBULATORY SURGERY CENTER | Age: 87
End: 2022-01-01
Payer: MEDICARE

## 2022-01-01 ENCOUNTER — TRANSCRIPTION ENCOUNTER (OUTPATIENT)
Age: 87
End: 2022-01-01

## 2022-01-01 ENCOUNTER — EMERGENCY (EMERGENCY)
Facility: HOSPITAL | Age: 87
LOS: 0 days | Discharge: HOME | End: 2022-06-25
Attending: EMERGENCY MEDICINE | Admitting: EMERGENCY MEDICINE
Payer: MEDICARE

## 2022-01-01 ENCOUNTER — APPOINTMENT (OUTPATIENT)
Dept: PLASTIC SURGERY | Facility: AMBULATORY SURGERY CENTER | Age: 87
End: 2022-01-01

## 2022-01-01 ENCOUNTER — LABORATORY RESULT (OUTPATIENT)
Age: 87
End: 2022-01-01

## 2022-01-01 ENCOUNTER — APPOINTMENT (OUTPATIENT)
Dept: NEUROLOGY | Facility: CLINIC | Age: 87
End: 2022-01-01

## 2022-01-01 ENCOUNTER — RX RENEWAL (OUTPATIENT)
Age: 87
End: 2022-01-01

## 2022-01-01 ENCOUNTER — APPOINTMENT (OUTPATIENT)
Dept: CARDIOLOGY | Facility: CLINIC | Age: 87
End: 2022-01-01
Payer: MEDICARE

## 2022-01-01 ENCOUNTER — INPATIENT (INPATIENT)
Facility: HOSPITAL | Age: 87
LOS: 8 days | Discharge: HOME | End: 2022-05-17
Attending: HOSPITALIST | Admitting: HOSPITALIST
Payer: MEDICARE

## 2022-01-01 ENCOUNTER — INPATIENT (INPATIENT)
Facility: HOSPITAL | Age: 87
LOS: 8 days | Discharge: HOSPICE | End: 2022-12-01
Attending: HOSPITALIST | Admitting: HOSPITALIST
Payer: MEDICARE

## 2022-01-01 ENCOUNTER — RESULT REVIEW (OUTPATIENT)
Age: 87
End: 2022-01-01

## 2022-01-01 VITALS
HEIGHT: 76 IN | OXYGEN SATURATION: 99 % | HEART RATE: 71 BPM | RESPIRATION RATE: 16 BRPM | SYSTOLIC BLOOD PRESSURE: 130 MMHG | WEIGHT: 183 LBS | DIASTOLIC BLOOD PRESSURE: 70 MMHG | BODY MASS INDEX: 22.29 KG/M2

## 2022-01-01 VITALS — BODY MASS INDEX: 22.38 KG/M2 | HEIGHT: 75 IN | WEIGHT: 180 LBS

## 2022-01-01 VITALS
SYSTOLIC BLOOD PRESSURE: 137 MMHG | HEART RATE: 69 BPM | HEIGHT: 75 IN | WEIGHT: 160.06 LBS | OXYGEN SATURATION: 98 % | RESPIRATION RATE: 18 BRPM | DIASTOLIC BLOOD PRESSURE: 66 MMHG | TEMPERATURE: 97 F

## 2022-01-01 VITALS
SYSTOLIC BLOOD PRESSURE: 124 MMHG | DIASTOLIC BLOOD PRESSURE: 74 MMHG | HEIGHT: 76 IN | WEIGHT: 166 LBS | BODY MASS INDEX: 20.22 KG/M2

## 2022-01-01 VITALS
SYSTOLIC BLOOD PRESSURE: 127 MMHG | OXYGEN SATURATION: 99 % | DIASTOLIC BLOOD PRESSURE: 72 MMHG | HEART RATE: 80 BPM | HEIGHT: 76 IN | RESPIRATION RATE: 18 BRPM | TEMPERATURE: 98 F

## 2022-01-01 VITALS
WEIGHT: 167 LBS | SYSTOLIC BLOOD PRESSURE: 176 MMHG | HEART RATE: 71 BPM | OXYGEN SATURATION: 98 % | DIASTOLIC BLOOD PRESSURE: 108 MMHG | HEIGHT: 76 IN | TEMPERATURE: 98.6 F | BODY MASS INDEX: 20.34 KG/M2

## 2022-01-01 VITALS
TEMPERATURE: 97.6 F | OXYGEN SATURATION: 97 % | SYSTOLIC BLOOD PRESSURE: 106 MMHG | DIASTOLIC BLOOD PRESSURE: 69 MMHG | HEART RATE: 67 BPM | HEIGHT: 75 IN | WEIGHT: 150 LBS | BODY MASS INDEX: 18.65 KG/M2

## 2022-01-01 VITALS
BODY MASS INDEX: 23.38 KG/M2 | WEIGHT: 192 LBS | SYSTOLIC BLOOD PRESSURE: 130 MMHG | HEIGHT: 76 IN | DIASTOLIC BLOOD PRESSURE: 80 MMHG

## 2022-01-01 VITALS
RESPIRATION RATE: 19 BRPM | DIASTOLIC BLOOD PRESSURE: 68 MMHG | SYSTOLIC BLOOD PRESSURE: 149 MMHG | TEMPERATURE: 97 F | HEART RATE: 72 BPM

## 2022-01-01 VITALS
HEIGHT: 76 IN | WEIGHT: 168 LBS | BODY MASS INDEX: 20.46 KG/M2 | SYSTOLIC BLOOD PRESSURE: 138 MMHG | DIASTOLIC BLOOD PRESSURE: 72 MMHG

## 2022-01-01 VITALS
DIASTOLIC BLOOD PRESSURE: 65 MMHG | OXYGEN SATURATION: 98 % | SYSTOLIC BLOOD PRESSURE: 128 MMHG | HEART RATE: 69 BPM | RESPIRATION RATE: 28 BRPM

## 2022-01-01 VITALS
BODY MASS INDEX: 18.65 KG/M2 | HEIGHT: 75 IN | DIASTOLIC BLOOD PRESSURE: 70 MMHG | WEIGHT: 150 LBS | SYSTOLIC BLOOD PRESSURE: 120 MMHG

## 2022-01-01 VITALS
OXYGEN SATURATION: 98 % | HEIGHT: 75 IN | RESPIRATION RATE: 18 BRPM | SYSTOLIC BLOOD PRESSURE: 168 MMHG | TEMPERATURE: 98 F | DIASTOLIC BLOOD PRESSURE: 79 MMHG | HEART RATE: 67 BPM

## 2022-01-01 VITALS
SYSTOLIC BLOOD PRESSURE: 164 MMHG | HEIGHT: 75 IN | DIASTOLIC BLOOD PRESSURE: 77 MMHG | RESPIRATION RATE: 18 BRPM | HEART RATE: 78 BPM | OXYGEN SATURATION: 100 %

## 2022-01-01 VITALS
SYSTOLIC BLOOD PRESSURE: 122 MMHG | DIASTOLIC BLOOD PRESSURE: 70 MMHG | BODY MASS INDEX: 18.65 KG/M2 | OXYGEN SATURATION: 96 % | HEIGHT: 75 IN | HEART RATE: 69 BPM | WEIGHT: 150 LBS

## 2022-01-01 VITALS
TEMPERATURE: 97 F | DIASTOLIC BLOOD PRESSURE: 65 MMHG | RESPIRATION RATE: 18 BRPM | SYSTOLIC BLOOD PRESSURE: 131 MMHG | HEART RATE: 70 BPM

## 2022-01-01 VITALS
DIASTOLIC BLOOD PRESSURE: 75 MMHG | TEMPERATURE: 97 F | RESPIRATION RATE: 20 BRPM | HEIGHT: 75 IN | SYSTOLIC BLOOD PRESSURE: 148 MMHG | OXYGEN SATURATION: 99 % | HEART RATE: 70 BPM | WEIGHT: 160.06 LBS

## 2022-01-01 DIAGNOSIS — C44.329 SQUAMOUS CELL CARCINOMA OF SKIN OF OTHER PARTS OF FACE: ICD-10-CM

## 2022-01-01 DIAGNOSIS — F05 DELIRIUM DUE TO KNOWN PHYSIOLOGICAL CONDITION: ICD-10-CM

## 2022-01-01 DIAGNOSIS — I95.1 ORTHOSTATIC HYPOTENSION: ICD-10-CM

## 2022-01-01 DIAGNOSIS — R94.31 ABNORMAL ELECTROCARDIOGRAM [ECG] [EKG]: ICD-10-CM

## 2022-01-01 DIAGNOSIS — I25.10 ATHEROSCLEROTIC HEART DISEASE OF NATIVE CORONARY ARTERY W/OUT ANGINA PECTORIS: ICD-10-CM

## 2022-01-01 DIAGNOSIS — Z95.1 PRESENCE OF AORTOCORONARY BYPASS GRAFT: ICD-10-CM

## 2022-01-01 DIAGNOSIS — Z80.8 FAMILY HISTORY OF MALIGNANT NEOPLASM OF OTHER ORGANS OR SYSTEMS: Chronic | ICD-10-CM

## 2022-01-01 DIAGNOSIS — I13.0 HYPERTENSIVE HEART AND CHRONIC KIDNEY DISEASE WITH HEART FAILURE AND STAGE 1 THROUGH STAGE 4 CHRONIC KIDNEY DISEASE, OR UNSPECIFIED CHRONIC KIDNEY DISEASE: ICD-10-CM

## 2022-01-01 DIAGNOSIS — N18.30 CHRONIC KIDNEY DISEASE, STAGE 3 UNSPECIFIED: ICD-10-CM

## 2022-01-01 DIAGNOSIS — R41.0 DISORIENTATION, UNSPECIFIED: ICD-10-CM
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
## 2022-01-01 DIAGNOSIS — F03.90 UNSPECIFIED DEMENTIA WITHOUT BEHAVIORAL DISTURBANCE: ICD-10-CM

## 2022-01-01 DIAGNOSIS — I10 ESSENTIAL (PRIMARY) HYPERTENSION: ICD-10-CM

## 2022-01-01 DIAGNOSIS — U07.1 COVID-19: ICD-10-CM

## 2022-01-01 DIAGNOSIS — Z20.822 CONTACT WITH AND (SUSPECTED) EXPOSURE TO COVID-19: ICD-10-CM

## 2022-01-01 DIAGNOSIS — Z98.890 OTHER SPECIFIED POSTPROCEDURAL STATES: Chronic | ICD-10-CM

## 2022-01-01 DIAGNOSIS — I12.9 HYPERTENSIVE CHRONIC KIDNEY DISEASE WITH STAGE 1 THROUGH STAGE 4 CHRONIC KIDNEY DISEASE, OR UNSPECIFIED CHRONIC KIDNEY DISEASE: ICD-10-CM

## 2022-01-01 DIAGNOSIS — F03.918 UNSPECIFIED DEMENTIA, UNSPECIFIED SEVERITY, WITH OTHER BEHAVIORAL DISTURBANCE: ICD-10-CM

## 2022-01-01 DIAGNOSIS — Z95.5 PRESENCE OF CORONARY ANGIOPLASTY IMPLANT AND GRAFT: ICD-10-CM

## 2022-01-01 DIAGNOSIS — C76.0 MALIGNANT NEOPLASM OF HEAD, FACE AND NECK: ICD-10-CM

## 2022-01-01 DIAGNOSIS — Z79.82 LONG TERM (CURRENT) USE OF ASPIRIN: ICD-10-CM

## 2022-01-01 DIAGNOSIS — Z45.02 ENCOUNTER FOR ADJUSTMENT AND MANAGEMENT OF AUTOMATIC IMPLANTABLE CARDIAC DEFIBRILLATOR: ICD-10-CM

## 2022-01-01 DIAGNOSIS — I25.10 ATHEROSCLEROTIC HEART DISEASE OF NATIVE CORONARY ARTERY WITHOUT ANGINA PECTORIS: ICD-10-CM

## 2022-01-01 DIAGNOSIS — Z01.818 ENCOUNTER FOR OTHER PREPROCEDURAL EXAMINATION: ICD-10-CM

## 2022-01-01 DIAGNOSIS — R52 PAIN, UNSPECIFIED: ICD-10-CM

## 2022-01-01 DIAGNOSIS — I49.5 SICK SINUS SYNDROME: ICD-10-CM

## 2022-01-01 DIAGNOSIS — I48.0 PAROXYSMAL ATRIAL FIBRILLATION: ICD-10-CM

## 2022-01-01 DIAGNOSIS — Z95.810 PRESENCE OF AUTOMATIC (IMPLANTABLE) CARDIAC DEFIBRILLATOR: ICD-10-CM

## 2022-01-01 DIAGNOSIS — E78.5 HYPERLIPIDEMIA, UNSPECIFIED: ICD-10-CM

## 2022-01-01 DIAGNOSIS — D53.9 NUTRITIONAL ANEMIA, UNSPECIFIED: ICD-10-CM

## 2022-01-01 DIAGNOSIS — D63.8 ANEMIA IN OTHER CHRONIC DISEASES CLASSIFIED ELSEWHERE: ICD-10-CM

## 2022-01-01 DIAGNOSIS — I25.5 ISCHEMIC CARDIOMYOPATHY: ICD-10-CM

## 2022-01-01 DIAGNOSIS — F41.9 ANXIETY DISORDER, UNSPECIFIED: ICD-10-CM

## 2022-01-01 DIAGNOSIS — I50.9 HEART FAILURE, UNSPECIFIED: ICD-10-CM

## 2022-01-01 DIAGNOSIS — I50.22 CHRONIC SYSTOLIC (CONGESTIVE) HEART FAILURE: ICD-10-CM
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
## 2022-01-01 DIAGNOSIS — C44.90 UNSPECIFIED MALIGNANT NEOPLASM OF SKIN, UNSPECIFIED: ICD-10-CM

## 2022-01-01 DIAGNOSIS — I48.91 UNSPECIFIED ATRIAL FIBRILLATION: ICD-10-CM

## 2022-01-01 DIAGNOSIS — I21.A1 MYOCARDIAL INFARCTION TYPE 2: ICD-10-CM

## 2022-01-01 DIAGNOSIS — S52.021A DISPLACED FRACTURE OF OLECRANON PROCESS WITHOUT INTRAARTICULAR EXTENSION OF RIGHT ULNA, INITIAL ENCOUNTER FOR CLOSED FRACTURE: ICD-10-CM

## 2022-01-01 DIAGNOSIS — Z45.018 ENCOUNTER FOR ADJUSTMENT AND MANAGEMENT OF OTHER PART OF CARDIAC PACEMAKER: ICD-10-CM
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
## 2022-01-01 DIAGNOSIS — S01.01XA LACERATION WITHOUT FOREIGN BODY OF SCALP, INITIAL ENCOUNTER: ICD-10-CM

## 2022-01-01 DIAGNOSIS — C85.90 NON-HODGKIN LYMPHOMA, UNSPECIFIED, UNSPECIFIED SITE: ICD-10-CM

## 2022-01-01 DIAGNOSIS — N18.9 CHRONIC KIDNEY DISEASE, UNSPECIFIED: ICD-10-CM

## 2022-01-01 DIAGNOSIS — K21.9 GASTRO-ESOPHAGEAL REFLUX DISEASE WITHOUT ESOPHAGITIS: ICD-10-CM

## 2022-01-01 DIAGNOSIS — I48.20 CHRONIC ATRIAL FIBRILLATION, UNSPECIFIED: ICD-10-CM

## 2022-01-01 DIAGNOSIS — R41.89 OTHER SYMPTOMS AND SIGNS INVOLVING COGNITIVE FUNCTIONS AND AWARENESS: ICD-10-CM

## 2022-01-01 DIAGNOSIS — Z23 ENCOUNTER FOR IMMUNIZATION: ICD-10-CM

## 2022-01-01 DIAGNOSIS — Z51.5 ENCOUNTER FOR PALLIATIVE CARE: ICD-10-CM

## 2022-01-01 LAB
A1C WITH ESTIMATED AVERAGE GLUCOSE RESULT: 5.4 % — SIGNIFICANT CHANGE UP (ref 4–5.6)
ALBUMIN SERPL ELPH-MCNC: 2.1 G/DL — LOW (ref 3.5–5.2)
ALBUMIN SERPL ELPH-MCNC: 2.2 G/DL — LOW (ref 3.5–5.2)
ALBUMIN SERPL ELPH-MCNC: 2.2 G/DL — LOW (ref 3.5–5.2)
ALBUMIN SERPL ELPH-MCNC: 2.3 G/DL — LOW (ref 3.5–5.2)
ALBUMIN SERPL ELPH-MCNC: 2.6 G/DL — LOW (ref 3.5–5.2)
ALBUMIN SERPL ELPH-MCNC: 2.8 G/DL
ALBUMIN SERPL ELPH-MCNC: 2.8 G/DL — LOW (ref 3.5–5.2)
ALBUMIN SERPL ELPH-MCNC: 3 G/DL — LOW (ref 3.5–5.2)
ALBUMIN SERPL ELPH-MCNC: 3 G/DL — LOW (ref 3.5–5.2)
ALBUMIN SERPL ELPH-MCNC: 3.1 G/DL — LOW (ref 3.5–5.2)
ALBUMIN SERPL ELPH-MCNC: 3.1 G/DL — LOW (ref 3.5–5.2)
ALBUMIN SERPL ELPH-MCNC: 3.4 G/DL — LOW (ref 3.5–5.2)
ALBUMIN SERPL ELPH-MCNC: 3.4 G/DL — LOW (ref 3.5–5.2)
ALBUMIN SERPL ELPH-MCNC: 3.6 G/DL — SIGNIFICANT CHANGE UP (ref 3.5–5.2)
ALBUMIN SERPL ELPH-MCNC: 3.7 G/DL
ALP BLD-CCNC: 65 U/L
ALP BLD-CCNC: 68 U/L
ALP SERPL-CCNC: 44 U/L — SIGNIFICANT CHANGE UP (ref 30–115)
ALP SERPL-CCNC: 46 U/L — SIGNIFICANT CHANGE UP (ref 30–115)
ALP SERPL-CCNC: 48 U/L — SIGNIFICANT CHANGE UP (ref 30–115)
ALP SERPL-CCNC: 50 U/L — SIGNIFICANT CHANGE UP (ref 30–115)
ALP SERPL-CCNC: 52 U/L — SIGNIFICANT CHANGE UP (ref 30–115)
ALP SERPL-CCNC: 60 U/L — SIGNIFICANT CHANGE UP (ref 30–115)
ALP SERPL-CCNC: 61 U/L — SIGNIFICANT CHANGE UP (ref 30–115)
ALP SERPL-CCNC: 67 U/L — SIGNIFICANT CHANGE UP (ref 30–115)
ALP SERPL-CCNC: 68 U/L — SIGNIFICANT CHANGE UP (ref 30–115)
ALP SERPL-CCNC: 69 U/L — SIGNIFICANT CHANGE UP (ref 30–115)
ALP SERPL-CCNC: 74 U/L — SIGNIFICANT CHANGE UP (ref 30–115)
ALP SERPL-CCNC: 74 U/L — SIGNIFICANT CHANGE UP (ref 30–115)
ALP SERPL-CCNC: 75 U/L — SIGNIFICANT CHANGE UP (ref 30–115)
ALP SERPL-CCNC: 79 U/L — SIGNIFICANT CHANGE UP (ref 30–115)
ALP SERPL-CCNC: 82 U/L — SIGNIFICANT CHANGE UP (ref 30–115)
ALT FLD-CCNC: 14 U/L — SIGNIFICANT CHANGE UP (ref 0–41)
ALT FLD-CCNC: 14 U/L — SIGNIFICANT CHANGE UP (ref 0–41)
ALT FLD-CCNC: 17 U/L — SIGNIFICANT CHANGE UP (ref 0–41)
ALT FLD-CCNC: 18 U/L — SIGNIFICANT CHANGE UP (ref 0–41)
ALT FLD-CCNC: 5 U/L — SIGNIFICANT CHANGE UP (ref 0–41)
ALT FLD-CCNC: 5 U/L — SIGNIFICANT CHANGE UP (ref 0–41)
ALT FLD-CCNC: 6 U/L — SIGNIFICANT CHANGE UP (ref 0–41)
ALT FLD-CCNC: 6 U/L — SIGNIFICANT CHANGE UP (ref 0–41)
ALT FLD-CCNC: 7 U/L — SIGNIFICANT CHANGE UP (ref 0–41)
ALT FLD-CCNC: 8 U/L — SIGNIFICANT CHANGE UP (ref 0–41)
ALT FLD-CCNC: 9 U/L — SIGNIFICANT CHANGE UP (ref 0–41)
ALT FLD-CCNC: <5 U/L — SIGNIFICANT CHANGE UP (ref 0–41)
ALT FLD-CCNC: <5 U/L — SIGNIFICANT CHANGE UP (ref 0–41)
ALT SERPL-CCNC: 13 U/L
ALT SERPL-CCNC: 7 U/L
AMMONIA BLD-MCNC: 28 UMOL/L — SIGNIFICANT CHANGE UP (ref 11–55)
ANION GAP SERPL CALC-SCNC: 10 MMOL/L — SIGNIFICANT CHANGE UP (ref 7–14)
ANION GAP SERPL CALC-SCNC: 11 MMOL/L
ANION GAP SERPL CALC-SCNC: 11 MMOL/L — SIGNIFICANT CHANGE UP (ref 7–14)
ANION GAP SERPL CALC-SCNC: 11 MMOL/L — SIGNIFICANT CHANGE UP (ref 7–14)
ANION GAP SERPL CALC-SCNC: 12 MMOL/L — SIGNIFICANT CHANGE UP (ref 7–14)
ANION GAP SERPL CALC-SCNC: 13 MMOL/L — SIGNIFICANT CHANGE UP (ref 7–14)
ANION GAP SERPL CALC-SCNC: 14 MMOL/L — SIGNIFICANT CHANGE UP (ref 7–14)
ANION GAP SERPL CALC-SCNC: 14 MMOL/L — SIGNIFICANT CHANGE UP (ref 7–14)
ANION GAP SERPL CALC-SCNC: 15 MMOL/L
ANION GAP SERPL CALC-SCNC: 4 MMOL/L — LOW (ref 7–14)
ANION GAP SERPL CALC-SCNC: 5 MMOL/L — LOW (ref 7–14)
ANION GAP SERPL CALC-SCNC: 7 MMOL/L — SIGNIFICANT CHANGE UP (ref 7–14)
ANION GAP SERPL CALC-SCNC: 7 MMOL/L — SIGNIFICANT CHANGE UP (ref 7–14)
ANION GAP SERPL CALC-SCNC: 8 MMOL/L — SIGNIFICANT CHANGE UP (ref 7–14)
ANION GAP SERPL CALC-SCNC: 9 MMOL/L — SIGNIFICANT CHANGE UP (ref 7–14)
APPEARANCE UR: CLEAR — SIGNIFICANT CHANGE UP
APPEARANCE: CLEAR
APTT BLD: 33.6 SEC — SIGNIFICANT CHANGE UP (ref 27–39.2)
APTT BLD: 35.4 SEC — SIGNIFICANT CHANGE UP (ref 27–39.2)
APTT BLD: 37.2 SEC — SIGNIFICANT CHANGE UP (ref 27–39.2)
APTT BLD: 38.1 SEC — SIGNIFICANT CHANGE UP (ref 27–39.2)
AST SERPL-CCNC: 11 U/L
AST SERPL-CCNC: 12 U/L — SIGNIFICANT CHANGE UP (ref 0–41)
AST SERPL-CCNC: 12 U/L — SIGNIFICANT CHANGE UP (ref 0–41)
AST SERPL-CCNC: 13 U/L — SIGNIFICANT CHANGE UP (ref 0–41)
AST SERPL-CCNC: 16 U/L
AST SERPL-CCNC: 16 U/L — SIGNIFICANT CHANGE UP (ref 0–41)
AST SERPL-CCNC: 17 U/L — SIGNIFICANT CHANGE UP (ref 0–41)
AST SERPL-CCNC: 17 U/L — SIGNIFICANT CHANGE UP (ref 0–41)
AST SERPL-CCNC: 18 U/L — SIGNIFICANT CHANGE UP (ref 0–41)
AST SERPL-CCNC: 20 U/L — SIGNIFICANT CHANGE UP (ref 0–41)
AST SERPL-CCNC: 21 U/L — SIGNIFICANT CHANGE UP (ref 0–41)
AST SERPL-CCNC: 22 U/L — SIGNIFICANT CHANGE UP (ref 0–41)
AST SERPL-CCNC: 24 U/L — SIGNIFICANT CHANGE UP (ref 0–41)
AST SERPL-CCNC: 24 U/L — SIGNIFICANT CHANGE UP (ref 0–41)
AST SERPL-CCNC: 33 U/L — SIGNIFICANT CHANGE UP (ref 0–41)
AST SERPL-CCNC: 8 U/L — SIGNIFICANT CHANGE UP (ref 0–41)
AST SERPL-CCNC: 9 U/L — SIGNIFICANT CHANGE UP (ref 0–41)
BACTERIA # UR AUTO: ABNORMAL
BACTERIA # UR AUTO: NEGATIVE — SIGNIFICANT CHANGE UP
BACTERIA: ABNORMAL
BASE EXCESS BLDA CALC-SCNC: 6.6 MMOL/L — HIGH (ref -2–3)
BASE EXCESS BLDV CALC-SCNC: 0.7 MMOL/L — SIGNIFICANT CHANGE UP (ref -2–3)
BASE EXCESS BLDV CALC-SCNC: 6.1 MMOL/L — HIGH (ref -2–3)
BASE EXCESS BLDV CALC-SCNC: 6.6 MMOL/L — HIGH (ref -2–3)
BASOPHILS # BLD AUTO: 0.02 K/UL — SIGNIFICANT CHANGE UP (ref 0–0.2)
BASOPHILS # BLD AUTO: 0.03 K/UL
BASOPHILS # BLD AUTO: 0.03 K/UL — SIGNIFICANT CHANGE UP (ref 0–0.2)
BASOPHILS # BLD AUTO: 0.04 K/UL — SIGNIFICANT CHANGE UP (ref 0–0.2)
BASOPHILS # BLD AUTO: 0.04 K/UL — SIGNIFICANT CHANGE UP (ref 0–0.2)
BASOPHILS # BLD AUTO: 0.05 K/UL — SIGNIFICANT CHANGE UP (ref 0–0.2)
BASOPHILS # BLD AUTO: 0.06 K/UL — SIGNIFICANT CHANGE UP (ref 0–0.2)
BASOPHILS # BLD AUTO: 0.07 K/UL — SIGNIFICANT CHANGE UP (ref 0–0.2)
BASOPHILS NFR BLD AUTO: 0.3 % — SIGNIFICANT CHANGE UP (ref 0–1)
BASOPHILS NFR BLD AUTO: 0.4 %
BASOPHILS NFR BLD AUTO: 0.4 % — SIGNIFICANT CHANGE UP (ref 0–1)
BASOPHILS NFR BLD AUTO: 0.4 % — SIGNIFICANT CHANGE UP (ref 0–1)
BASOPHILS NFR BLD AUTO: 0.6 % — SIGNIFICANT CHANGE UP (ref 0–1)
BASOPHILS NFR BLD AUTO: 0.7 % — SIGNIFICANT CHANGE UP (ref 0–1)
BASOPHILS NFR BLD AUTO: 0.7 % — SIGNIFICANT CHANGE UP (ref 0–1)
BASOPHILS NFR BLD AUTO: 0.8 % — SIGNIFICANT CHANGE UP (ref 0–1)
BASOPHILS NFR BLD AUTO: 0.8 % — SIGNIFICANT CHANGE UP (ref 0–1)
BILIRUB SERPL-MCNC: 0.6 MG/DL — SIGNIFICANT CHANGE UP (ref 0.2–1.2)
BILIRUB SERPL-MCNC: 0.7 MG/DL
BILIRUB SERPL-MCNC: 0.7 MG/DL — SIGNIFICANT CHANGE UP (ref 0.2–1.2)
BILIRUB SERPL-MCNC: 0.8 MG/DL — SIGNIFICANT CHANGE UP (ref 0.2–1.2)
BILIRUB SERPL-MCNC: 1 MG/DL — SIGNIFICANT CHANGE UP (ref 0.2–1.2)
BILIRUB SERPL-MCNC: 1.1 MG/DL
BILIRUB SERPL-MCNC: 1.2 MG/DL — SIGNIFICANT CHANGE UP (ref 0.2–1.2)
BILIRUB SERPL-MCNC: 1.2 MG/DL — SIGNIFICANT CHANGE UP (ref 0.2–1.2)
BILIRUB SERPL-MCNC: 1.3 MG/DL — HIGH (ref 0.2–1.2)
BILIRUB SERPL-MCNC: 1.3 MG/DL — HIGH (ref 0.2–1.2)
BILIRUB SERPL-MCNC: 1.6 MG/DL — HIGH (ref 0.2–1.2)
BILIRUB UR-MCNC: NEGATIVE — SIGNIFICANT CHANGE UP
BILIRUBIN URINE: ABNORMAL
BLD GP AB SCN SERPL QL: SIGNIFICANT CHANGE UP
BLOOD URINE: NEGATIVE
BUN SERPL-MCNC: 20 MG/DL — SIGNIFICANT CHANGE UP (ref 10–20)
BUN SERPL-MCNC: 21 MG/DL
BUN SERPL-MCNC: 21 MG/DL — HIGH (ref 10–20)
BUN SERPL-MCNC: 22 MG/DL — HIGH (ref 10–20)
BUN SERPL-MCNC: 23 MG/DL — HIGH (ref 10–20)
BUN SERPL-MCNC: 24 MG/DL — HIGH (ref 10–20)
BUN SERPL-MCNC: 25 MG/DL — HIGH (ref 10–20)
BUN SERPL-MCNC: 25 MG/DL — HIGH (ref 10–20)
BUN SERPL-MCNC: 26 MG/DL
BUN SERPL-MCNC: 26 MG/DL — HIGH (ref 10–20)
BUN SERPL-MCNC: 27 MG/DL — HIGH (ref 10–20)
BUN SERPL-MCNC: 27 MG/DL — HIGH (ref 10–20)
BUN SERPL-MCNC: 32 MG/DL — HIGH (ref 10–20)
BUN SERPL-MCNC: 32 MG/DL — HIGH (ref 10–20)
BUN SERPL-MCNC: 33 MG/DL — HIGH (ref 10–20)
CA-I SERPL-SCNC: 1.08 MMOL/L — LOW (ref 1.15–1.33)
CA-I SERPL-SCNC: 1.1 MMOL/L — LOW (ref 1.15–1.33)
CA-I SERPL-SCNC: 1.26 MMOL/L — SIGNIFICANT CHANGE UP (ref 1.15–1.33)
CALCIUM OXALATE CRYSTALS: ABNORMAL
CALCIUM SERPL-MCNC: 7.9 MG/DL — LOW (ref 8.4–10.5)
CALCIUM SERPL-MCNC: 8 MG/DL — LOW (ref 8.4–10.5)
CALCIUM SERPL-MCNC: 8.1 MG/DL — LOW (ref 8.4–10.5)
CALCIUM SERPL-MCNC: 8.1 MG/DL — LOW (ref 8.4–10.5)
CALCIUM SERPL-MCNC: 8.2 MG/DL — LOW (ref 8.4–10.5)
CALCIUM SERPL-MCNC: 8.3 MG/DL — LOW (ref 8.4–10.5)
CALCIUM SERPL-MCNC: 8.3 MG/DL — LOW (ref 8.5–10.1)
CALCIUM SERPL-MCNC: 8.5 MG/DL — SIGNIFICANT CHANGE UP (ref 8.4–10.4)
CALCIUM SERPL-MCNC: 8.5 MG/DL — SIGNIFICANT CHANGE UP (ref 8.4–10.5)
CALCIUM SERPL-MCNC: 8.5 MG/DL — SIGNIFICANT CHANGE UP (ref 8.5–10.1)
CALCIUM SERPL-MCNC: 8.6 MG/DL — SIGNIFICANT CHANGE UP (ref 8.5–10.1)
CALCIUM SERPL-MCNC: 8.6 MG/DL — SIGNIFICANT CHANGE UP (ref 8.5–10.1)
CALCIUM SERPL-MCNC: 8.7 MG/DL
CALCIUM SERPL-MCNC: 8.7 MG/DL — SIGNIFICANT CHANGE UP (ref 8.5–10.1)
CALCIUM SERPL-MCNC: 8.8 MG/DL — SIGNIFICANT CHANGE UP (ref 8.5–10.1)
CALCIUM SERPL-MCNC: 8.9 MG/DL — SIGNIFICANT CHANGE UP (ref 8.4–10.5)
CALCIUM SERPL-MCNC: 8.9 MG/DL — SIGNIFICANT CHANGE UP (ref 8.5–10.1)
CALCIUM SERPL-MCNC: 9 MG/DL — SIGNIFICANT CHANGE UP (ref 8.5–10.1)
CALCIUM SERPL-MCNC: 9 MG/DL — SIGNIFICANT CHANGE UP (ref 8.5–10.1)
CALCIUM SERPL-MCNC: 9.2 MG/DL
CALCIUM SERPL-MCNC: 9.4 MG/DL — SIGNIFICANT CHANGE UP (ref 8.4–10.5)
CHLORIDE SERPL-SCNC: 100 MMOL/L — SIGNIFICANT CHANGE UP (ref 98–110)
CHLORIDE SERPL-SCNC: 102 MMOL/L
CHLORIDE SERPL-SCNC: 102 MMOL/L — SIGNIFICANT CHANGE UP (ref 98–110)
CHLORIDE SERPL-SCNC: 102 MMOL/L — SIGNIFICANT CHANGE UP (ref 98–110)
CHLORIDE SERPL-SCNC: 103 MMOL/L — SIGNIFICANT CHANGE UP (ref 98–110)
CHLORIDE SERPL-SCNC: 103 MMOL/L — SIGNIFICANT CHANGE UP (ref 98–110)
CHLORIDE SERPL-SCNC: 104 MMOL/L
CHLORIDE SERPL-SCNC: 104 MMOL/L — SIGNIFICANT CHANGE UP (ref 98–110)
CHLORIDE SERPL-SCNC: 105 MMOL/L — SIGNIFICANT CHANGE UP (ref 98–110)
CHLORIDE SERPL-SCNC: 106 MMOL/L — SIGNIFICANT CHANGE UP (ref 98–110)
CHLORIDE SERPL-SCNC: 107 MMOL/L — SIGNIFICANT CHANGE UP (ref 98–110)
CHLORIDE SERPL-SCNC: 108 MMOL/L — SIGNIFICANT CHANGE UP (ref 98–110)
CHOLEST SERPL-MCNC: 87 MG/DL — SIGNIFICANT CHANGE UP
CK MB CFR SERPL CALC: 2 NG/ML — SIGNIFICANT CHANGE UP (ref 0.6–6.3)
CK MB CFR SERPL CALC: 2.8 NG/ML — SIGNIFICANT CHANGE UP (ref 0.6–6.3)
CK MB CFR SERPL CALC: 3.1 NG/ML — SIGNIFICANT CHANGE UP (ref 0.6–6.3)
CK SERPL-CCNC: 15 U/L — SIGNIFICANT CHANGE UP (ref 0–225)
CK SERPL-CCNC: 16 U/L — SIGNIFICANT CHANGE UP (ref 0–225)
CK SERPL-CCNC: 60 U/L — SIGNIFICANT CHANGE UP (ref 0–225)
CK SERPL-CCNC: <7 U/L — SIGNIFICANT CHANGE UP (ref 0–225)
CO2 SERPL-SCNC: 17 MMOL/L — SIGNIFICANT CHANGE UP (ref 17–32)
CO2 SERPL-SCNC: 22 MMOL/L — SIGNIFICANT CHANGE UP (ref 17–32)
CO2 SERPL-SCNC: 22 MMOL/L — SIGNIFICANT CHANGE UP (ref 17–32)
CO2 SERPL-SCNC: 23 MMOL/L — SIGNIFICANT CHANGE UP (ref 17–32)
CO2 SERPL-SCNC: 23 MMOL/L — SIGNIFICANT CHANGE UP (ref 17–32)
CO2 SERPL-SCNC: 24 MMOL/L
CO2 SERPL-SCNC: 24 MMOL/L — SIGNIFICANT CHANGE UP (ref 17–32)
CO2 SERPL-SCNC: 25 MMOL/L — SIGNIFICANT CHANGE UP (ref 17–32)
CO2 SERPL-SCNC: 26 MMOL/L — SIGNIFICANT CHANGE UP (ref 17–32)
CO2 SERPL-SCNC: 27 MMOL/L — SIGNIFICANT CHANGE UP (ref 17–32)
CO2 SERPL-SCNC: 27 MMOL/L — SIGNIFICANT CHANGE UP (ref 17–32)
CO2 SERPL-SCNC: 29 MMOL/L
CO2 SERPL-SCNC: 29 MMOL/L — SIGNIFICANT CHANGE UP (ref 17–32)
CO2 SERPL-SCNC: 30 MMOL/L — SIGNIFICANT CHANGE UP (ref 17–32)
CO2 SERPL-SCNC: 31 MMOL/L — SIGNIFICANT CHANGE UP (ref 17–32)
CO2 SERPL-SCNC: 31 MMOL/L — SIGNIFICANT CHANGE UP (ref 17–32)
CO2 SERPL-SCNC: 32 MMOL/L — SIGNIFICANT CHANGE UP (ref 17–32)
CO2 SERPL-SCNC: 33 MMOL/L — HIGH (ref 17–32)
CO2 SERPL-SCNC: 35 MMOL/L — HIGH (ref 17–32)
COLOR SPEC: ABNORMAL
COLOR SPEC: SIGNIFICANT CHANGE UP
COLOR SPEC: YELLOW — SIGNIFICANT CHANGE UP
COLOR SPEC: YELLOW — SIGNIFICANT CHANGE UP
COLOR: YELLOW
CREAT SERPL-MCNC: 1 MG/DL — SIGNIFICANT CHANGE UP (ref 0.7–1.5)
CREAT SERPL-MCNC: 1.1 MG/DL — SIGNIFICANT CHANGE UP (ref 0.7–1.5)
CREAT SERPL-MCNC: 1.2 MG/DL
CREAT SERPL-MCNC: 1.2 MG/DL — SIGNIFICANT CHANGE UP (ref 0.7–1.5)
CREAT SERPL-MCNC: 1.3 MG/DL — SIGNIFICANT CHANGE UP (ref 0.7–1.5)
CREAT SERPL-MCNC: 1.4 MG/DL — SIGNIFICANT CHANGE UP (ref 0.7–1.5)
CREAT SERPL-MCNC: 1.5 MG/DL — SIGNIFICANT CHANGE UP (ref 0.7–1.5)
CREAT SERPL-MCNC: 1.6 MG/DL
CRP SERPL-MCNC: 56.9 MG/L — HIGH
CULTURE RESULTS: SIGNIFICANT CHANGE UP
D DIMER BLD IA.RAPID-MCNC: 609 NG/ML DDU — HIGH (ref 0–230)
DIFF PNL FLD: NEGATIVE — SIGNIFICANT CHANGE UP
EGFR: 40 ML/MIN/1.73M2
EGFR: 43 ML/MIN/1.73M2 — LOW
EGFR: 47 ML/MIN/1.73M2 — LOW
EGFR: 51 ML/MIN/1.73M2 — LOW
EGFR: 52 ML/MIN/1.73M2 — LOW
EGFR: 56 ML/MIN/1.73M2
EGFR: 56 ML/MIN/1.73M2 — LOW
EGFR: 56 ML/MIN/1.73M2 — LOW
EGFR: 57 ML/MIN/1.73M2 — LOW
EGFR: 63 ML/MIN/1.73M2 — SIGNIFICANT CHANGE UP
EGFR: 70 ML/MIN/1.73M2 — SIGNIFICANT CHANGE UP
EOSINOPHIL # BLD AUTO: 0 K/UL — SIGNIFICANT CHANGE UP (ref 0–0.7)
EOSINOPHIL # BLD AUTO: 0 K/UL — SIGNIFICANT CHANGE UP (ref 0–0.7)
EOSINOPHIL # BLD AUTO: 0.03 K/UL — SIGNIFICANT CHANGE UP (ref 0–0.7)
EOSINOPHIL # BLD AUTO: 0.04 K/UL
EOSINOPHIL # BLD AUTO: 0.07 K/UL — SIGNIFICANT CHANGE UP (ref 0–0.7)
EOSINOPHIL # BLD AUTO: 0.1 K/UL — SIGNIFICANT CHANGE UP (ref 0–0.7)
EOSINOPHIL # BLD AUTO: 0.12 K/UL — SIGNIFICANT CHANGE UP (ref 0–0.7)
EOSINOPHIL # BLD AUTO: 0.17 K/UL — SIGNIFICANT CHANGE UP (ref 0–0.7)
EOSINOPHIL # BLD AUTO: 0.19 K/UL — SIGNIFICANT CHANGE UP (ref 0–0.7)
EOSINOPHIL # BLD AUTO: 0.21 K/UL — SIGNIFICANT CHANGE UP (ref 0–0.7)
EOSINOPHIL # BLD AUTO: 0.24 K/UL — SIGNIFICANT CHANGE UP (ref 0–0.7)
EOSINOPHIL # BLD AUTO: 0.28 K/UL — SIGNIFICANT CHANGE UP (ref 0–0.7)
EOSINOPHIL # BLD AUTO: 0.56 K/UL — SIGNIFICANT CHANGE UP (ref 0–0.7)
EOSINOPHIL NFR BLD AUTO: 0 % — SIGNIFICANT CHANGE UP (ref 0–8)
EOSINOPHIL NFR BLD AUTO: 0 % — SIGNIFICANT CHANGE UP (ref 0–8)
EOSINOPHIL NFR BLD AUTO: 0.3 % — SIGNIFICANT CHANGE UP (ref 0–8)
EOSINOPHIL NFR BLD AUTO: 0.5 %
EOSINOPHIL NFR BLD AUTO: 1.3 % — SIGNIFICANT CHANGE UP (ref 0–8)
EOSINOPHIL NFR BLD AUTO: 1.5 % — SIGNIFICANT CHANGE UP (ref 0–8)
EOSINOPHIL NFR BLD AUTO: 1.8 % — SIGNIFICANT CHANGE UP (ref 0–8)
EOSINOPHIL NFR BLD AUTO: 2.3 % — SIGNIFICANT CHANGE UP (ref 0–8)
EOSINOPHIL NFR BLD AUTO: 2.3 % — SIGNIFICANT CHANGE UP (ref 0–8)
EOSINOPHIL NFR BLD AUTO: 2.4 % — SIGNIFICANT CHANGE UP (ref 0–8)
EOSINOPHIL NFR BLD AUTO: 3 % — SIGNIFICANT CHANGE UP (ref 0–8)
EOSINOPHIL NFR BLD AUTO: 4.2 % — SIGNIFICANT CHANGE UP (ref 0–8)
EOSINOPHIL NFR BLD AUTO: 9.9 % — HIGH (ref 0–8)
EPI CELLS # UR: 0 /HPF — SIGNIFICANT CHANGE UP (ref 0–5)
EPI CELLS # UR: 4 /HPF — SIGNIFICANT CHANGE UP (ref 0–5)
ESTIMATED AVERAGE GLUCOSE: 108 MG/DL — SIGNIFICANT CHANGE UP (ref 68–114)
ETHANOL SERPL-MCNC: <10 MG/DL — SIGNIFICANT CHANGE UP
FERRITIN SERPL-MCNC: 199 NG/ML — SIGNIFICANT CHANGE UP (ref 30–400)
FERRITIN SERPL-MCNC: 437 NG/ML — HIGH (ref 30–400)
FOLATE SERPL-MCNC: 11 NG/ML — SIGNIFICANT CHANGE UP
FOLATE SERPL-MCNC: 11.9 NG/ML — SIGNIFICANT CHANGE UP
FOLATE SERPL-MCNC: 7 NG/ML — SIGNIFICANT CHANGE UP
GAS PNL BLDV: 134 MMOL/L — LOW (ref 136–145)
GAS PNL BLDV: 134 MMOL/L — LOW (ref 136–145)
GAS PNL BLDV: 137 MMOL/L — SIGNIFICANT CHANGE UP (ref 136–145)
GAS PNL BLDV: SIGNIFICANT CHANGE UP
GLUCOSE BLDC GLUCOMTR-MCNC: 167 MG/DL — HIGH (ref 70–99)
GLUCOSE BLDC GLUCOMTR-MCNC: 73 MG/DL — SIGNIFICANT CHANGE UP (ref 70–99)
GLUCOSE QUALITATIVE U: NEGATIVE
GLUCOSE SERPL-MCNC: 101 MG/DL — HIGH (ref 70–99)
GLUCOSE SERPL-MCNC: 103 MG/DL — HIGH (ref 70–99)
GLUCOSE SERPL-MCNC: 104 MG/DL — HIGH (ref 70–99)
GLUCOSE SERPL-MCNC: 111 MG/DL — HIGH (ref 70–99)
GLUCOSE SERPL-MCNC: 113 MG/DL — HIGH (ref 70–99)
GLUCOSE SERPL-MCNC: 123 MG/DL — HIGH (ref 70–99)
GLUCOSE SERPL-MCNC: 62 MG/DL — LOW (ref 70–99)
GLUCOSE SERPL-MCNC: 63 MG/DL — LOW (ref 70–99)
GLUCOSE SERPL-MCNC: 64 MG/DL — LOW (ref 70–99)
GLUCOSE SERPL-MCNC: 69 MG/DL — LOW (ref 70–99)
GLUCOSE SERPL-MCNC: 76 MG/DL — SIGNIFICANT CHANGE UP (ref 70–99)
GLUCOSE SERPL-MCNC: 77 MG/DL
GLUCOSE SERPL-MCNC: 79 MG/DL — SIGNIFICANT CHANGE UP (ref 70–99)
GLUCOSE SERPL-MCNC: 84 MG/DL
GLUCOSE SERPL-MCNC: 85 MG/DL — SIGNIFICANT CHANGE UP (ref 70–99)
GLUCOSE SERPL-MCNC: 88 MG/DL — SIGNIFICANT CHANGE UP (ref 70–99)
GLUCOSE SERPL-MCNC: 89 MG/DL — SIGNIFICANT CHANGE UP (ref 70–99)
GLUCOSE SERPL-MCNC: 90 MG/DL — SIGNIFICANT CHANGE UP (ref 70–99)
GLUCOSE SERPL-MCNC: 92 MG/DL — SIGNIFICANT CHANGE UP (ref 70–99)
GLUCOSE SERPL-MCNC: 96 MG/DL — SIGNIFICANT CHANGE UP (ref 70–99)
GLUCOSE SERPL-MCNC: 96 MG/DL — SIGNIFICANT CHANGE UP (ref 70–99)
GLUCOSE UR QL: NEGATIVE — SIGNIFICANT CHANGE UP
HCO3 BLDA-SCNC: 32 MMOL/L — HIGH (ref 21–28)
HCO3 BLDV-SCNC: 27 MMOL/L — SIGNIFICANT CHANGE UP (ref 22–29)
HCO3 BLDV-SCNC: 32 MMOL/L — HIGH (ref 22–29)
HCO3 BLDV-SCNC: 33 MMOL/L — HIGH (ref 22–29)
HCT VFR BLD CALC: 23.3 % — LOW (ref 42–52)
HCT VFR BLD CALC: 23.3 % — LOW (ref 42–52)
HCT VFR BLD CALC: 24.3 % — LOW (ref 42–52)
HCT VFR BLD CALC: 24.6 % — LOW (ref 42–52)
HCT VFR BLD CALC: 24.8 % — LOW (ref 42–52)
HCT VFR BLD CALC: 25.3 % — LOW (ref 42–52)
HCT VFR BLD CALC: 25.8 % — LOW (ref 42–52)
HCT VFR BLD CALC: 26.1 % — LOW (ref 42–52)
HCT VFR BLD CALC: 26.3 % — LOW (ref 42–52)
HCT VFR BLD CALC: 26.4 % — LOW (ref 42–52)
HCT VFR BLD CALC: 26.5 % — LOW (ref 42–52)
HCT VFR BLD CALC: 26.8 % — LOW (ref 42–52)
HCT VFR BLD CALC: 27.3 % — LOW (ref 42–52)
HCT VFR BLD CALC: 27.8 % — LOW (ref 42–52)
HCT VFR BLD CALC: 28.2 % — LOW (ref 42–52)
HCT VFR BLD CALC: 28.6 % — LOW (ref 42–52)
HCT VFR BLD CALC: 29.1 % — LOW (ref 42–52)
HCT VFR BLD CALC: 29.5 % — LOW (ref 42–52)
HCT VFR BLD CALC: 29.7 % — LOW (ref 42–52)
HCT VFR BLD CALC: 29.9 % — LOW (ref 42–52)
HCT VFR BLD CALC: 31.5 % — LOW (ref 42–52)
HCT VFR BLD CALC: 31.5 % — LOW (ref 42–52)
HCT VFR BLD CALC: 33 %
HCT VFR BLD CALC: 35.9 % — LOW (ref 42–52)
HCT VFR BLDA CALC: 28 % — LOW (ref 39–51)
HCT VFR BLDA CALC: 28 % — LOW (ref 39–51)
HCT VFR BLDA CALC: 45 % — SIGNIFICANT CHANGE UP (ref 39–51)
HDLC SERPL-MCNC: 49 MG/DL — SIGNIFICANT CHANGE UP
HGB BLD CALC-MCNC: 14.9 G/DL — SIGNIFICANT CHANGE UP (ref 12.6–17.4)
HGB BLD CALC-MCNC: 9.2 G/DL — LOW (ref 12.6–17.4)
HGB BLD CALC-MCNC: 9.4 G/DL — LOW (ref 12.6–17.4)
HGB BLD-MCNC: 10.3 G/DL — LOW (ref 14–18)
HGB BLD-MCNC: 10.4 G/DL — LOW (ref 14–18)
HGB BLD-MCNC: 10.6 G/DL
HGB BLD-MCNC: 11.9 G/DL — LOW (ref 14–18)
HGB BLD-MCNC: 7.7 G/DL — LOW (ref 14–18)
HGB BLD-MCNC: 7.7 G/DL — LOW (ref 14–18)
HGB BLD-MCNC: 8 G/DL — LOW (ref 14–18)
HGB BLD-MCNC: 8 G/DL — LOW (ref 14–18)
HGB BLD-MCNC: 8.3 G/DL — LOW (ref 14–18)
HGB BLD-MCNC: 8.6 G/DL — LOW (ref 14–18)
HGB BLD-MCNC: 8.7 G/DL — LOW (ref 14–18)
HGB BLD-MCNC: 8.7 G/DL — LOW (ref 14–18)
HGB BLD-MCNC: 8.8 G/DL — LOW (ref 14–18)
HGB BLD-MCNC: 8.9 G/DL — LOW (ref 14–18)
HGB BLD-MCNC: 9.2 G/DL — LOW (ref 14–18)
HGB BLD-MCNC: 9.3 G/DL — LOW (ref 14–18)
HGB BLD-MCNC: 9.3 G/DL — LOW (ref 14–18)
HGB BLD-MCNC: 9.4 G/DL — LOW (ref 14–18)
HGB BLD-MCNC: 9.8 G/DL — LOW (ref 14–18)
HGB BLD-MCNC: 9.9 G/DL — LOW (ref 14–18)
HYALINE CASTS # UR AUTO: 0 /LPF — SIGNIFICANT CHANGE UP (ref 0–7)
HYALINE CASTS # UR AUTO: 4 /LPF — SIGNIFICANT CHANGE UP (ref 0–7)
HYALINE CASTS: 5 /LPF
IMM GRANULOCYTES NFR BLD AUTO: 0.1 % — SIGNIFICANT CHANGE UP (ref 0.1–0.3)
IMM GRANULOCYTES NFR BLD AUTO: 0.1 % — SIGNIFICANT CHANGE UP (ref 0.1–0.3)
IMM GRANULOCYTES NFR BLD AUTO: 0.2 % — SIGNIFICANT CHANGE UP (ref 0.1–0.3)
IMM GRANULOCYTES NFR BLD AUTO: 0.3 % — SIGNIFICANT CHANGE UP (ref 0.1–0.3)
IMM GRANULOCYTES NFR BLD AUTO: 0.4 %
IMM GRANULOCYTES NFR BLD AUTO: 0.4 % — HIGH (ref 0.1–0.3)
IMM GRANULOCYTES NFR BLD AUTO: 0.5 % — HIGH (ref 0.1–0.3)
INR BLD: 1.22 RATIO — SIGNIFICANT CHANGE UP (ref 0.65–1.3)
INR BLD: 1.25 RATIO — SIGNIFICANT CHANGE UP (ref 0.65–1.3)
INR BLD: 1.31 RATIO — HIGH (ref 0.65–1.3)
INR BLD: 1.32 RATIO — HIGH (ref 0.65–1.3)
IRON SATN MFR SERPL: 32 % — SIGNIFICANT CHANGE UP (ref 15–50)
IRON SATN MFR SERPL: 51 UG/DL — SIGNIFICANT CHANGE UP (ref 35–150)
KETONES UR-MCNC: NEGATIVE — SIGNIFICANT CHANGE UP
KETONES URINE: NEGATIVE
LACTATE BLDV-MCNC: 1.2 MMOL/L — SIGNIFICANT CHANGE UP (ref 0.5–2)
LACTATE BLDV-MCNC: 1.7 MMOL/L — SIGNIFICANT CHANGE UP (ref 0.5–2)
LACTATE BLDV-MCNC: 1.8 MMOL/L — SIGNIFICANT CHANGE UP (ref 0.5–2)
LACTATE SERPL-SCNC: 1 MMOL/L — SIGNIFICANT CHANGE UP (ref 0.7–2)
LACTATE SERPL-SCNC: 2.4 MMOL/L — HIGH (ref 0.7–2)
LDH SERPL L TO P-CCNC: 142 U/L — SIGNIFICANT CHANGE UP (ref 50–242)
LEUKOCYTE ESTERASE UR-ACNC: NEGATIVE — SIGNIFICANT CHANGE UP
LEUKOCYTE ESTERASE URINE: NEGATIVE
LIDOCAIN IGE QN: 14 U/L — SIGNIFICANT CHANGE UP (ref 7–60)
LIPID PNL WITH DIRECT LDL SERPL: 30 MG/DL — SIGNIFICANT CHANGE UP
LYMPHOCYTES # BLD AUTO: 0.49 K/UL — LOW (ref 1.2–3.4)
LYMPHOCYTES # BLD AUTO: 0.93 K/UL — LOW (ref 1.2–3.4)
LYMPHOCYTES # BLD AUTO: 0.99 K/UL — LOW (ref 1.2–3.4)
LYMPHOCYTES # BLD AUTO: 1.21 K/UL — SIGNIFICANT CHANGE UP (ref 1.2–3.4)
LYMPHOCYTES # BLD AUTO: 1.31 K/UL — SIGNIFICANT CHANGE UP (ref 1.2–3.4)
LYMPHOCYTES # BLD AUTO: 1.38 K/UL
LYMPHOCYTES # BLD AUTO: 1.54 K/UL — SIGNIFICANT CHANGE UP (ref 1.2–3.4)
LYMPHOCYTES # BLD AUTO: 1.79 K/UL — SIGNIFICANT CHANGE UP (ref 1.2–3.4)
LYMPHOCYTES # BLD AUTO: 1.88 K/UL — SIGNIFICANT CHANGE UP (ref 1.2–3.4)
LYMPHOCYTES # BLD AUTO: 11.5 % — LOW (ref 20.5–51.1)
LYMPHOCYTES # BLD AUTO: 13.8 % — LOW (ref 20.5–51.1)
LYMPHOCYTES # BLD AUTO: 19 % — LOW (ref 20.5–51.1)
LYMPHOCYTES # BLD AUTO: 2.19 K/UL — SIGNIFICANT CHANGE UP (ref 1.2–3.4)
LYMPHOCYTES # BLD AUTO: 2.22 K/UL — SIGNIFICANT CHANGE UP (ref 1.2–3.4)
LYMPHOCYTES # BLD AUTO: 2.47 K/UL — SIGNIFICANT CHANGE UP (ref 1.2–3.4)
LYMPHOCYTES # BLD AUTO: 2.59 K/UL — SIGNIFICANT CHANGE UP (ref 1.2–3.4)
LYMPHOCYTES # BLD AUTO: 23.1 % — SIGNIFICANT CHANGE UP (ref 20.5–51.1)
LYMPHOCYTES # BLD AUTO: 23.7 % — SIGNIFICANT CHANGE UP (ref 20.5–51.1)
LYMPHOCYTES # BLD AUTO: 25.2 % — SIGNIFICANT CHANGE UP (ref 20.5–51.1)
LYMPHOCYTES # BLD AUTO: 27.5 % — SIGNIFICANT CHANGE UP (ref 20.5–51.1)
LYMPHOCYTES # BLD AUTO: 28 % — SIGNIFICANT CHANGE UP (ref 20.5–51.1)
LYMPHOCYTES # BLD AUTO: 28.4 % — SIGNIFICANT CHANGE UP (ref 20.5–51.1)
LYMPHOCYTES # BLD AUTO: 29.6 % — SIGNIFICANT CHANGE UP (ref 20.5–51.1)
LYMPHOCYTES # BLD AUTO: 39.4 % — SIGNIFICANT CHANGE UP (ref 20.5–51.1)
LYMPHOCYTES # BLD AUTO: 7.9 % — LOW (ref 20.5–51.1)
LYMPHOCYTES NFR BLD AUTO: 18.5 %
MAGNESIUM SERPL-MCNC: 1.8 MG/DL — SIGNIFICANT CHANGE UP (ref 1.8–2.4)
MAGNESIUM SERPL-MCNC: 1.9 MG/DL — SIGNIFICANT CHANGE UP (ref 1.8–2.4)
MAGNESIUM SERPL-MCNC: 2 MG/DL — SIGNIFICANT CHANGE UP (ref 1.8–2.4)
MAGNESIUM SERPL-MCNC: 2 MG/DL — SIGNIFICANT CHANGE UP (ref 1.8–2.4)
MAGNESIUM SERPL-MCNC: 2.1 MG/DL — SIGNIFICANT CHANGE UP (ref 1.8–2.4)
MAGNESIUM SERPL-MCNC: 2.2 MG/DL — SIGNIFICANT CHANGE UP (ref 1.8–2.4)
MAGNESIUM SERPL-MCNC: 2.3 MG/DL — SIGNIFICANT CHANGE UP (ref 1.8–2.4)
MAN DIFF?: NORMAL
MCHC RBC-ENTMCNC: 31.3 G/DL — LOW (ref 32–37)
MCHC RBC-ENTMCNC: 31.4 G/DL — LOW (ref 32–37)
MCHC RBC-ENTMCNC: 31.6 G/DL — LOW (ref 32–37)
MCHC RBC-ENTMCNC: 32 G/DL — SIGNIFICANT CHANGE UP (ref 32–37)
MCHC RBC-ENTMCNC: 32.1 G/DL
MCHC RBC-ENTMCNC: 32.3 G/DL — SIGNIFICANT CHANGE UP (ref 32–37)
MCHC RBC-ENTMCNC: 32.5 G/DL — SIGNIFICANT CHANGE UP (ref 32–37)
MCHC RBC-ENTMCNC: 32.5 G/DL — SIGNIFICANT CHANGE UP (ref 32–37)
MCHC RBC-ENTMCNC: 32.6 G/DL — SIGNIFICANT CHANGE UP (ref 32–37)
MCHC RBC-ENTMCNC: 32.7 G/DL — SIGNIFICANT CHANGE UP (ref 32–37)
MCHC RBC-ENTMCNC: 32.8 G/DL — SIGNIFICANT CHANGE UP (ref 32–37)
MCHC RBC-ENTMCNC: 33 G/DL — SIGNIFICANT CHANGE UP (ref 32–37)
MCHC RBC-ENTMCNC: 33 PG — HIGH (ref 27–31)
MCHC RBC-ENTMCNC: 33 PG — HIGH (ref 27–31)
MCHC RBC-ENTMCNC: 33.1 G/DL — SIGNIFICANT CHANGE UP (ref 32–37)
MCHC RBC-ENTMCNC: 33.1 G/DL — SIGNIFICANT CHANGE UP (ref 32–37)
MCHC RBC-ENTMCNC: 33.2 G/DL — SIGNIFICANT CHANGE UP (ref 32–37)
MCHC RBC-ENTMCNC: 33.2 G/DL — SIGNIFICANT CHANGE UP (ref 32–37)
MCHC RBC-ENTMCNC: 33.2 PG — HIGH (ref 27–31)
MCHC RBC-ENTMCNC: 33.3 G/DL — SIGNIFICANT CHANGE UP (ref 32–37)
MCHC RBC-ENTMCNC: 33.3 PG — HIGH (ref 27–31)
MCHC RBC-ENTMCNC: 33.4 PG
MCHC RBC-ENTMCNC: 33.5 PG — HIGH (ref 27–31)
MCHC RBC-ENTMCNC: 33.6 PG — HIGH (ref 27–31)
MCHC RBC-ENTMCNC: 33.7 PG — HIGH (ref 27–31)
MCHC RBC-ENTMCNC: 33.8 PG — HIGH (ref 27–31)
MCHC RBC-ENTMCNC: 34 PG — HIGH (ref 27–31)
MCHC RBC-ENTMCNC: 34 PG — HIGH (ref 27–31)
MCHC RBC-ENTMCNC: 34.1 G/DL — SIGNIFICANT CHANGE UP (ref 32–37)
MCHC RBC-ENTMCNC: 34.2 G/DL — SIGNIFICANT CHANGE UP (ref 32–37)
MCHC RBC-ENTMCNC: 34.3 PG — HIGH (ref 27–31)
MCHC RBC-ENTMCNC: 34.6 PG — HIGH (ref 27–31)
MCHC RBC-ENTMCNC: 34.9 PG — HIGH (ref 27–31)
MCHC RBC-ENTMCNC: 35 PG — HIGH (ref 27–31)
MCHC RBC-ENTMCNC: 35.1 PG — HIGH (ref 27–31)
MCV RBC AUTO: 100 FL — HIGH (ref 80–94)
MCV RBC AUTO: 100.4 FL — HIGH (ref 80–94)
MCV RBC AUTO: 100.4 FL — HIGH (ref 80–94)
MCV RBC AUTO: 100.7 FL — HIGH (ref 80–94)
MCV RBC AUTO: 101 FL — HIGH (ref 80–94)
MCV RBC AUTO: 101.2 FL — HIGH (ref 80–94)
MCV RBC AUTO: 101.4 FL — HIGH (ref 80–94)
MCV RBC AUTO: 101.4 FL — HIGH (ref 80–94)
MCV RBC AUTO: 101.5 FL — HIGH (ref 80–94)
MCV RBC AUTO: 101.7 FL — HIGH (ref 80–94)
MCV RBC AUTO: 102.9 FL — HIGH (ref 80–94)
MCV RBC AUTO: 104.1 FL
MCV RBC AUTO: 105.4 FL — HIGH (ref 80–94)
MCV RBC AUTO: 105.9 FL — HIGH (ref 80–94)
MCV RBC AUTO: 106 FL — HIGH (ref 80–94)
MCV RBC AUTO: 106.2 FL — HIGH (ref 80–94)
MCV RBC AUTO: 107 FL — HIGH (ref 80–94)
MCV RBC AUTO: 107.4 FL — HIGH (ref 80–94)
MCV RBC AUTO: 107.9 FL — HIGH (ref 80–94)
MCV RBC AUTO: 109.4 FL — HIGH (ref 80–94)
MCV RBC AUTO: 109.5 FL — HIGH (ref 80–94)
MCV RBC AUTO: 109.9 FL — HIGH (ref 80–94)
MCV RBC AUTO: 99.6 FL — HIGH (ref 80–94)
MCV RBC AUTO: 99.6 FL — HIGH (ref 80–94)
MICROSCOPIC-UA: NORMAL
MONOCYTES # BLD AUTO: 0.77 K/UL — HIGH (ref 0.1–0.6)
MONOCYTES # BLD AUTO: 0.81 K/UL — HIGH (ref 0.1–0.6)
MONOCYTES # BLD AUTO: 0.84 K/UL — HIGH (ref 0.1–0.6)
MONOCYTES # BLD AUTO: 0.86 K/UL — HIGH (ref 0.1–0.6)
MONOCYTES # BLD AUTO: 0.99 K/UL — HIGH (ref 0.1–0.6)
MONOCYTES # BLD AUTO: 1 K/UL — HIGH (ref 0.1–0.6)
MONOCYTES # BLD AUTO: 1.03 K/UL
MONOCYTES # BLD AUTO: 1.05 K/UL — HIGH (ref 0.1–0.6)
MONOCYTES # BLD AUTO: 1.13 K/UL — HIGH (ref 0.1–0.6)
MONOCYTES # BLD AUTO: 1.23 K/UL — HIGH (ref 0.1–0.6)
MONOCYTES # BLD AUTO: 1.3 K/UL — HIGH (ref 0.1–0.6)
MONOCYTES # BLD AUTO: 1.36 K/UL — HIGH (ref 0.1–0.6)
MONOCYTES # BLD AUTO: 1.45 K/UL — HIGH (ref 0.1–0.6)
MONOCYTES NFR BLD AUTO: 10.8 % — HIGH (ref 1.7–9.3)
MONOCYTES NFR BLD AUTO: 12.4 % — HIGH (ref 1.7–9.3)
MONOCYTES NFR BLD AUTO: 13.8 %
MONOCYTES NFR BLD AUTO: 14.7 % — HIGH (ref 1.7–9.3)
MONOCYTES NFR BLD AUTO: 14.8 % — HIGH (ref 1.7–9.3)
MONOCYTES NFR BLD AUTO: 15.2 % — HIGH (ref 1.7–9.3)
MONOCYTES NFR BLD AUTO: 15.5 % — HIGH (ref 1.7–9.3)
MONOCYTES NFR BLD AUTO: 15.9 % — HIGH (ref 1.7–9.3)
MONOCYTES NFR BLD AUTO: 15.9 % — HIGH (ref 1.7–9.3)
MONOCYTES NFR BLD AUTO: 16.1 % — HIGH (ref 1.7–9.3)
MONOCYTES NFR BLD AUTO: 16.2 % — HIGH (ref 1.7–9.3)
MONOCYTES NFR BLD AUTO: 16.3 % — HIGH (ref 1.7–9.3)
MONOCYTES NFR BLD AUTO: 16.3 % — HIGH (ref 1.7–9.3)
NEUTROPHILS # BLD AUTO: 1.95 K/UL — SIGNIFICANT CHANGE UP (ref 1.4–6.5)
NEUTROPHILS # BLD AUTO: 3.07 K/UL — SIGNIFICANT CHANGE UP (ref 1.4–6.5)
NEUTROPHILS # BLD AUTO: 3.26 K/UL — SIGNIFICANT CHANGE UP (ref 1.4–6.5)
NEUTROPHILS # BLD AUTO: 3.51 K/UL — SIGNIFICANT CHANGE UP (ref 1.4–6.5)
NEUTROPHILS # BLD AUTO: 3.74 K/UL — SIGNIFICANT CHANGE UP (ref 1.4–6.5)
NEUTROPHILS # BLD AUTO: 3.93 K/UL — SIGNIFICANT CHANGE UP (ref 1.4–6.5)
NEUTROPHILS # BLD AUTO: 4.17 K/UL — SIGNIFICANT CHANGE UP (ref 1.4–6.5)
NEUTROPHILS # BLD AUTO: 4.26 K/UL — SIGNIFICANT CHANGE UP (ref 1.4–6.5)
NEUTROPHILS # BLD AUTO: 4.78 K/UL — SIGNIFICANT CHANGE UP (ref 1.4–6.5)
NEUTROPHILS # BLD AUTO: 4.91 K/UL — SIGNIFICANT CHANGE UP (ref 1.4–6.5)
NEUTROPHILS # BLD AUTO: 4.95 K/UL
NEUTROPHILS # BLD AUTO: 4.99 K/UL — SIGNIFICANT CHANGE UP (ref 1.4–6.5)
NEUTROPHILS # BLD AUTO: 8.49 K/UL — HIGH (ref 1.4–6.5)
NEUTROPHILS NFR BLD AUTO: 34.6 % — LOW (ref 42.2–75.2)
NEUTROPHILS NFR BLD AUTO: 51 % — SIGNIFICANT CHANGE UP (ref 42.2–75.2)
NEUTROPHILS NFR BLD AUTO: 52.3 % — SIGNIFICANT CHANGE UP (ref 42.2–75.2)
NEUTROPHILS NFR BLD AUTO: 52.5 % — SIGNIFICANT CHANGE UP (ref 42.2–75.2)
NEUTROPHILS NFR BLD AUTO: 54.7 % — SIGNIFICANT CHANGE UP (ref 42.2–75.2)
NEUTROPHILS NFR BLD AUTO: 55.4 % — SIGNIFICANT CHANGE UP (ref 42.2–75.2)
NEUTROPHILS NFR BLD AUTO: 57.5 % — SIGNIFICANT CHANGE UP (ref 42.2–75.2)
NEUTROPHILS NFR BLD AUTO: 58.7 % — SIGNIFICANT CHANGE UP (ref 42.2–75.2)
NEUTROPHILS NFR BLD AUTO: 62.4 % — SIGNIFICANT CHANGE UP (ref 42.2–75.2)
NEUTROPHILS NFR BLD AUTO: 66.4 %
NEUTROPHILS NFR BLD AUTO: 70.7 % — SIGNIFICANT CHANGE UP (ref 42.2–75.2)
NEUTROPHILS NFR BLD AUTO: 74.9 % — SIGNIFICANT CHANGE UP (ref 42.2–75.2)
NEUTROPHILS NFR BLD AUTO: 78.9 % — HIGH (ref 42.2–75.2)
NITRITE UR-MCNC: NEGATIVE — SIGNIFICANT CHANGE UP
NITRITE URINE: NEGATIVE
NON HDL CHOLESTEROL: 38 MG/DL — SIGNIFICANT CHANGE UP
NRBC # BLD: 0 /100 WBCS — SIGNIFICANT CHANGE UP (ref 0–0)
NT-PROBNP SERPL-SCNC: HIGH PG/ML (ref 0–300)
PCO2 BLDA: 50 MMHG — HIGH (ref 35–48)
PCO2 BLDV: 46 MMHG — SIGNIFICANT CHANGE UP (ref 42–55)
PCO2 BLDV: 56 MMHG — HIGH (ref 42–55)
PCO2 BLDV: 57 MMHG — HIGH (ref 42–55)
PH BLDA: 7.42 — SIGNIFICANT CHANGE UP (ref 7.35–7.45)
PH BLDV: 7.37 — SIGNIFICANT CHANGE UP (ref 7.32–7.43)
PH UR: 6 — SIGNIFICANT CHANGE UP (ref 5–8)
PH UR: 6.5 — SIGNIFICANT CHANGE UP (ref 5–8)
PH UR: 7 — SIGNIFICANT CHANGE UP (ref 5–8)
PH UR: 7 — SIGNIFICANT CHANGE UP (ref 5–8)
PH URINE: 6.5
PHOSPHATE SERPL-MCNC: 3.4 MG/DL — SIGNIFICANT CHANGE UP (ref 2.1–4.9)
PHOSPHATE SERPL-MCNC: 3.6 MG/DL — SIGNIFICANT CHANGE UP (ref 2.1–4.9)
PHOSPHATE SERPL-MCNC: 3.8 MG/DL — SIGNIFICANT CHANGE UP (ref 2.1–4.9)
PHOSPHATE SERPL-MCNC: 3.8 MG/DL — SIGNIFICANT CHANGE UP (ref 2.1–4.9)
PHOSPHATE SERPL-MCNC: 4.4 MG/DL — SIGNIFICANT CHANGE UP (ref 2.1–4.9)
PLATELET # BLD AUTO: 139 K/UL — SIGNIFICANT CHANGE UP (ref 130–400)
PLATELET # BLD AUTO: 148 K/UL — SIGNIFICANT CHANGE UP (ref 130–400)
PLATELET # BLD AUTO: 154 K/UL — SIGNIFICANT CHANGE UP (ref 130–400)
PLATELET # BLD AUTO: 158 K/UL — SIGNIFICANT CHANGE UP (ref 130–400)
PLATELET # BLD AUTO: 158 K/UL — SIGNIFICANT CHANGE UP (ref 130–400)
PLATELET # BLD AUTO: 161 K/UL — SIGNIFICANT CHANGE UP (ref 130–400)
PLATELET # BLD AUTO: 168 K/UL — SIGNIFICANT CHANGE UP (ref 130–400)
PLATELET # BLD AUTO: 169 K/UL — SIGNIFICANT CHANGE UP (ref 130–400)
PLATELET # BLD AUTO: 191 K/UL — SIGNIFICANT CHANGE UP (ref 130–400)
PLATELET # BLD AUTO: 208 K/UL — SIGNIFICANT CHANGE UP (ref 130–400)
PLATELET # BLD AUTO: 216 K/UL
PLATELET # BLD AUTO: 217 K/UL — SIGNIFICANT CHANGE UP (ref 130–400)
PLATELET # BLD AUTO: 219 K/UL — SIGNIFICANT CHANGE UP (ref 130–400)
PLATELET # BLD AUTO: 221 K/UL — SIGNIFICANT CHANGE UP (ref 130–400)
PLATELET # BLD AUTO: 228 K/UL — SIGNIFICANT CHANGE UP (ref 130–400)
PLATELET # BLD AUTO: 237 K/UL — SIGNIFICANT CHANGE UP (ref 130–400)
PLATELET # BLD AUTO: 246 K/UL — SIGNIFICANT CHANGE UP (ref 130–400)
PLATELET # BLD AUTO: 251 K/UL — SIGNIFICANT CHANGE UP (ref 130–400)
PLATELET # BLD AUTO: 259 K/UL — SIGNIFICANT CHANGE UP (ref 130–400)
PLATELET # BLD AUTO: 262 K/UL — SIGNIFICANT CHANGE UP (ref 130–400)
PLATELET # BLD AUTO: 302 K/UL — SIGNIFICANT CHANGE UP (ref 130–400)
PLATELET # BLD AUTO: 329 K/UL — SIGNIFICANT CHANGE UP (ref 130–400)
PLATELET # BLD AUTO: 346 K/UL — SIGNIFICANT CHANGE UP (ref 130–400)
PLATELET # BLD AUTO: 94 K/UL — LOW (ref 130–400)
PO2 BLDA: 69 MMHG — LOW (ref 83–108)
PO2 BLDV: 31 MMHG — SIGNIFICANT CHANGE UP
PO2 BLDV: 31 MMHG — SIGNIFICANT CHANGE UP
PO2 BLDV: 37 MMHG — SIGNIFICANT CHANGE UP
POTASSIUM BLDV-SCNC: 4.7 MMOL/L — SIGNIFICANT CHANGE UP (ref 3.5–5.1)
POTASSIUM BLDV-SCNC: 6.1 MMOL/L — HIGH (ref 3.5–5.1)
POTASSIUM BLDV-SCNC: 8.5 MMOL/L — CRITICAL HIGH (ref 3.5–5.1)
POTASSIUM SERPL-MCNC: 4 MMOL/L — SIGNIFICANT CHANGE UP (ref 3.5–5)
POTASSIUM SERPL-MCNC: 4.2 MMOL/L — SIGNIFICANT CHANGE UP (ref 3.5–5)
POTASSIUM SERPL-MCNC: 4.3 MMOL/L — SIGNIFICANT CHANGE UP (ref 3.5–5)
POTASSIUM SERPL-MCNC: 4.4 MMOL/L — SIGNIFICANT CHANGE UP (ref 3.5–5)
POTASSIUM SERPL-MCNC: 4.4 MMOL/L — SIGNIFICANT CHANGE UP (ref 3.5–5)
POTASSIUM SERPL-MCNC: 4.5 MMOL/L — SIGNIFICANT CHANGE UP (ref 3.5–5)
POTASSIUM SERPL-MCNC: 4.6 MMOL/L — SIGNIFICANT CHANGE UP (ref 3.5–5)
POTASSIUM SERPL-MCNC: 4.7 MMOL/L — SIGNIFICANT CHANGE UP (ref 3.5–5)
POTASSIUM SERPL-MCNC: 4.8 MMOL/L — SIGNIFICANT CHANGE UP (ref 3.5–5)
POTASSIUM SERPL-MCNC: 5 MMOL/L — SIGNIFICANT CHANGE UP (ref 3.5–5)
POTASSIUM SERPL-MCNC: 5 MMOL/L — SIGNIFICANT CHANGE UP (ref 3.5–5)
POTASSIUM SERPL-MCNC: 5.2 MMOL/L — HIGH (ref 3.5–5)
POTASSIUM SERPL-MCNC: 5.3 MMOL/L — HIGH (ref 3.5–5)
POTASSIUM SERPL-MCNC: 5.5 MMOL/L — HIGH (ref 3.5–5)
POTASSIUM SERPL-MCNC: 5.9 MMOL/L — HIGH (ref 3.5–5)
POTASSIUM SERPL-MCNC: 6 MMOL/L — CRITICAL HIGH (ref 3.5–5)
POTASSIUM SERPL-SCNC: 4 MMOL/L — SIGNIFICANT CHANGE UP (ref 3.5–5)
POTASSIUM SERPL-SCNC: 4.2 MMOL/L — SIGNIFICANT CHANGE UP (ref 3.5–5)
POTASSIUM SERPL-SCNC: 4.3 MMOL/L — SIGNIFICANT CHANGE UP (ref 3.5–5)
POTASSIUM SERPL-SCNC: 4.4 MMOL/L — SIGNIFICANT CHANGE UP (ref 3.5–5)
POTASSIUM SERPL-SCNC: 4.4 MMOL/L — SIGNIFICANT CHANGE UP (ref 3.5–5)
POTASSIUM SERPL-SCNC: 4.5 MMOL/L — SIGNIFICANT CHANGE UP (ref 3.5–5)
POTASSIUM SERPL-SCNC: 4.6 MMOL/L — SIGNIFICANT CHANGE UP (ref 3.5–5)
POTASSIUM SERPL-SCNC: 4.7 MMOL/L
POTASSIUM SERPL-SCNC: 4.7 MMOL/L
POTASSIUM SERPL-SCNC: 4.7 MMOL/L — SIGNIFICANT CHANGE UP (ref 3.5–5)
POTASSIUM SERPL-SCNC: 4.8 MMOL/L — SIGNIFICANT CHANGE UP (ref 3.5–5)
POTASSIUM SERPL-SCNC: 5 MMOL/L — SIGNIFICANT CHANGE UP (ref 3.5–5)
POTASSIUM SERPL-SCNC: 5 MMOL/L — SIGNIFICANT CHANGE UP (ref 3.5–5)
POTASSIUM SERPL-SCNC: 5.2 MMOL/L — HIGH (ref 3.5–5)
POTASSIUM SERPL-SCNC: 5.3 MMOL/L — HIGH (ref 3.5–5)
POTASSIUM SERPL-SCNC: 5.5 MMOL/L — HIGH (ref 3.5–5)
POTASSIUM SERPL-SCNC: 5.9 MMOL/L — HIGH (ref 3.5–5)
POTASSIUM SERPL-SCNC: 6 MMOL/L — CRITICAL HIGH (ref 3.5–5)
PROCALCITONIN SERPL-MCNC: 0.06 NG/ML — SIGNIFICANT CHANGE UP (ref 0.02–0.1)
PROT SERPL-MCNC: 5.2 G/DL — LOW (ref 6–8)
PROT SERPL-MCNC: 5.3 G/DL — LOW (ref 6–8)
PROT SERPL-MCNC: 5.6 G/DL — LOW (ref 6–8)
PROT SERPL-MCNC: 5.7 G/DL — LOW (ref 6–8)
PROT SERPL-MCNC: 6.4 G/DL
PROT SERPL-MCNC: 6.4 G/DL — SIGNIFICANT CHANGE UP (ref 6–8)
PROT SERPL-MCNC: 6.5 G/DL — SIGNIFICANT CHANGE UP (ref 6–8)
PROT SERPL-MCNC: 6.7 G/DL — SIGNIFICANT CHANGE UP (ref 6–8)
PROT SERPL-MCNC: 6.7 G/DL — SIGNIFICANT CHANGE UP (ref 6–8)
PROT SERPL-MCNC: 6.8 G/DL — SIGNIFICANT CHANGE UP (ref 6–8)
PROT SERPL-MCNC: 7 G/DL — SIGNIFICANT CHANGE UP (ref 6–8)
PROT SERPL-MCNC: 7.3 G/DL — SIGNIFICANT CHANGE UP (ref 6–8)
PROT SERPL-MCNC: 7.6 G/DL — SIGNIFICANT CHANGE UP (ref 6–8)
PROT SERPL-MCNC: 7.9 G/DL — SIGNIFICANT CHANGE UP (ref 6–8)
PROT SERPL-MCNC: 8.3 G/DL
PROT UR-MCNC: ABNORMAL
PROT UR-MCNC: SIGNIFICANT CHANGE UP
PROTEIN URINE: NORMAL
PROTHROM AB SERPL-ACNC: 14 SEC — HIGH (ref 9.95–12.87)
PROTHROM AB SERPL-ACNC: 14.4 SEC — HIGH (ref 9.95–12.87)
PROTHROM AB SERPL-ACNC: 15 SEC — HIGH (ref 9.95–12.87)
PROTHROM AB SERPL-ACNC: 15.1 SEC — HIGH (ref 9.95–12.87)
RBC # BLD: 2.2 M/UL — LOW (ref 4.7–6.1)
RBC # BLD: 2.28 M/UL — LOW (ref 4.7–6.1)
RBC # BLD: 2.31 M/UL — LOW (ref 4.7–6.1)
RBC # BLD: 2.32 M/UL — LOW (ref 4.7–6.1)
RBC # BLD: 2.42 M/UL — LOW (ref 4.7–6.1)
RBC # BLD: 2.44 M/UL — LOW (ref 4.7–6.1)
RBC # BLD: 2.49 M/UL — LOW (ref 4.7–6.1)
RBC # BLD: 2.5 M/UL — LOW (ref 4.7–6.1)
RBC # BLD: 2.54 M/UL — LOW (ref 4.7–6.1)
RBC # BLD: 2.57 M/UL — LOW (ref 4.7–6.1)
RBC # BLD: 2.59 M/UL — LOW (ref 4.7–6.1)
RBC # BLD: 2.61 M/UL — LOW (ref 4.7–6.1)
RBC # BLD: 2.62 M/UL — LOW (ref 4.7–6.1)
RBC # BLD: 2.64 M/UL — LOW (ref 4.7–6.1)
RBC # BLD: 2.72 M/UL — LOW (ref 4.7–6.1)
RBC # BLD: 2.72 M/UL — LOW (ref 4.7–6.1)
RBC # BLD: 2.78 M/UL — LOW (ref 4.7–6.1)
RBC # BLD: 2.8 M/UL — LOW (ref 4.7–6.1)
RBC # BLD: 2.82 M/UL — LOW (ref 4.7–6.1)
RBC # BLD: 2.95 M/UL — LOW (ref 4.7–6.1)
RBC # BLD: 3.06 M/UL — LOW (ref 4.7–6.1)
RBC # BLD: 3.12 M/UL — LOW (ref 4.7–6.1)
RBC # BLD: 3.17 M/UL
RBC # BLD: 3.53 M/UL — LOW (ref 4.7–6.1)
RBC # FLD: 13.2 % — SIGNIFICANT CHANGE UP (ref 11.5–14.5)
RBC # FLD: 13.3 % — SIGNIFICANT CHANGE UP (ref 11.5–14.5)
RBC # FLD: 13.4 % — SIGNIFICANT CHANGE UP (ref 11.5–14.5)
RBC # FLD: 13.4 % — SIGNIFICANT CHANGE UP (ref 11.5–14.5)
RBC # FLD: 13.7 % — SIGNIFICANT CHANGE UP (ref 11.5–14.5)
RBC # FLD: 13.8 % — SIGNIFICANT CHANGE UP (ref 11.5–14.5)
RBC # FLD: 13.8 % — SIGNIFICANT CHANGE UP (ref 11.5–14.5)
RBC # FLD: 14 % — SIGNIFICANT CHANGE UP (ref 11.5–14.5)
RBC # FLD: 14.1 % — SIGNIFICANT CHANGE UP (ref 11.5–14.5)
RBC # FLD: 14.4 % — SIGNIFICANT CHANGE UP (ref 11.5–14.5)
RBC # FLD: 14.5 % — SIGNIFICANT CHANGE UP (ref 11.5–14.5)
RBC # FLD: 15.3 %
RBC # FLD: 15.4 % — HIGH (ref 11.5–14.5)
RBC # FLD: 15.4 % — HIGH (ref 11.5–14.5)
RBC # FLD: 15.5 % — HIGH (ref 11.5–14.5)
RBC # FLD: 15.7 % — HIGH (ref 11.5–14.5)
RBC # FLD: 15.7 % — HIGH (ref 11.5–14.5)
RBC # FLD: 15.8 % — HIGH (ref 11.5–14.5)
RBC # FLD: 15.9 % — HIGH (ref 11.5–14.5)
RBC # FLD: 15.9 % — HIGH (ref 11.5–14.5)
RBC CASTS # UR COMP ASSIST: 1 /HPF — SIGNIFICANT CHANGE UP (ref 0–4)
RBC CASTS # UR COMP ASSIST: 3 /HPF — SIGNIFICANT CHANGE UP (ref 0–4)
RED BLOOD CELLS URINE: 1 /HPF
SAO2 % BLDA: 96.1 % — SIGNIFICANT CHANGE UP (ref 94–98)
SAO2 % BLDV: 45.1 % — SIGNIFICANT CHANGE UP
SAO2 % BLDV: 48.9 % — SIGNIFICANT CHANGE UP
SAO2 % BLDV: 61.2 % — SIGNIFICANT CHANGE UP
SARS-COV-2 RNA SPEC QL NAA+PROBE: DETECTED
SARS-COV-2 RNA SPEC QL NAA+PROBE: DETECTED
SARS-COV-2 RNA SPEC QL NAA+PROBE: SIGNIFICANT CHANGE UP
SODIUM SERPL-SCNC: 135 MMOL/L — SIGNIFICANT CHANGE UP (ref 135–146)
SODIUM SERPL-SCNC: 136 MMOL/L — SIGNIFICANT CHANGE UP (ref 135–146)
SODIUM SERPL-SCNC: 138 MMOL/L — SIGNIFICANT CHANGE UP (ref 135–146)
SODIUM SERPL-SCNC: 139 MMOL/L — SIGNIFICANT CHANGE UP (ref 135–146)
SODIUM SERPL-SCNC: 140 MMOL/L — SIGNIFICANT CHANGE UP (ref 135–146)
SODIUM SERPL-SCNC: 141 MMOL/L
SODIUM SERPL-SCNC: 141 MMOL/L — SIGNIFICANT CHANGE UP (ref 135–146)
SODIUM SERPL-SCNC: 141 MMOL/L — SIGNIFICANT CHANGE UP (ref 135–146)
SODIUM SERPL-SCNC: 142 MMOL/L — SIGNIFICANT CHANGE UP (ref 135–146)
SODIUM SERPL-SCNC: 143 MMOL/L — SIGNIFICANT CHANGE UP (ref 135–146)
SODIUM SERPL-SCNC: 144 MMOL/L
SODIUM SERPL-SCNC: 145 MMOL/L — SIGNIFICANT CHANGE UP (ref 135–146)
SODIUM SERPL-SCNC: 145 MMOL/L — SIGNIFICANT CHANGE UP (ref 135–146)
SODIUM SERPL-SCNC: 146 MMOL/L — SIGNIFICANT CHANGE UP (ref 135–146)
SODIUM SERPL-SCNC: 146 MMOL/L — SIGNIFICANT CHANGE UP (ref 135–146)
SP GR SPEC: 1.01 — SIGNIFICANT CHANGE UP (ref 1.01–1.03)
SP GR SPEC: 1.01 — SIGNIFICANT CHANGE UP (ref 1.01–1.03)
SP GR SPEC: 1.02 — SIGNIFICANT CHANGE UP (ref 1.01–1.03)
SP GR SPEC: 1.03 — SIGNIFICANT CHANGE UP (ref 1.01–1.03)
SPECIFIC GRAVITY URINE: 1.02
SPECIMEN SOURCE: SIGNIFICANT CHANGE UP
SQUAMOUS EPITHELIAL CELLS: 0 /HPF
SURGICAL PATHOLOGY STUDY: SIGNIFICANT CHANGE UP
T4 FREE SERPL-MCNC: 1 NG/DL
TIBC SERPL-MCNC: 159 UG/DL — LOW (ref 220–430)
TRIGL SERPL-MCNC: 37 MG/DL — SIGNIFICANT CHANGE UP
TROPONIN T SERPL-MCNC: 0.02 NG/ML — HIGH
TROPONIN T SERPL-MCNC: 0.03 NG/ML — CRITICAL HIGH
TROPONIN T SERPL-MCNC: 0.04 NG/ML — CRITICAL HIGH
TROPONIN T SERPL-MCNC: 0.05 NG/ML — CRITICAL HIGH
TROPONIN T SERPL-MCNC: 0.05 NG/ML — CRITICAL HIGH
TSH SERPL-ACNC: 7.62 UIU/ML
TSH SERPL-ACNC: 7.96 UIU/ML
TSH SERPL-MCNC: 10.05 UIU/ML — HIGH (ref 0.27–4.2)
TSH SERPL-MCNC: 4.57 UIU/ML — HIGH (ref 0.27–4.2)
UIBC SERPL-MCNC: 108 UG/DL — LOW (ref 110–370)
URINE COMMENTS: NORMAL
UROBILINOGEN FLD QL: ABNORMAL
UROBILINOGEN FLD QL: ABNORMAL
UROBILINOGEN FLD QL: SIGNIFICANT CHANGE UP
UROBILINOGEN FLD QL: SIGNIFICANT CHANGE UP
UROBILINOGEN URINE: ABNORMAL
VALPROATE SERPL-MCNC: 18 UG/ML
VALPROATE SERPL-MCNC: 24 UG/ML — LOW (ref 50–100)
VIT B12 SERPL-MCNC: 1368 PG/ML — HIGH (ref 232–1245)
VIT B12 SERPL-MCNC: 1609 PG/ML — HIGH (ref 232–1245)
VIT B12 SERPL-MCNC: 389 PG/ML — SIGNIFICANT CHANGE UP (ref 232–1245)
WBC # BLD: 11.36 K/UL — HIGH (ref 4.8–10.8)
WBC # BLD: 5.12 K/UL — SIGNIFICANT CHANGE UP (ref 4.8–10.8)
WBC # BLD: 5.22 K/UL — SIGNIFICANT CHANGE UP (ref 4.8–10.8)
WBC # BLD: 5.23 K/UL — SIGNIFICANT CHANGE UP (ref 4.8–10.8)
WBC # BLD: 5.41 K/UL — SIGNIFICANT CHANGE UP (ref 4.8–10.8)
WBC # BLD: 5.45 K/UL — SIGNIFICANT CHANGE UP (ref 4.8–10.8)
WBC # BLD: 5.49 K/UL — SIGNIFICANT CHANGE UP (ref 4.8–10.8)
WBC # BLD: 5.64 K/UL — SIGNIFICANT CHANGE UP (ref 4.8–10.8)
WBC # BLD: 5.7 K/UL — SIGNIFICANT CHANGE UP (ref 4.8–10.8)
WBC # BLD: 5.76 K/UL — SIGNIFICANT CHANGE UP (ref 4.8–10.8)
WBC # BLD: 6.22 K/UL — SIGNIFICANT CHANGE UP (ref 4.8–10.8)
WBC # BLD: 6.33 K/UL — SIGNIFICANT CHANGE UP (ref 4.8–10.8)
WBC # BLD: 6.5 K/UL — SIGNIFICANT CHANGE UP (ref 4.8–10.8)
WBC # BLD: 6.5 K/UL — SIGNIFICANT CHANGE UP (ref 4.8–10.8)
WBC # BLD: 6.72 K/UL — SIGNIFICANT CHANGE UP (ref 4.8–10.8)
WBC # BLD: 6.76 K/UL — SIGNIFICANT CHANGE UP (ref 4.8–10.8)
WBC # BLD: 7.1 K/UL — SIGNIFICANT CHANGE UP (ref 4.8–10.8)
WBC # BLD: 7.92 K/UL — SIGNIFICANT CHANGE UP (ref 4.8–10.8)
WBC # BLD: 7.96 K/UL — SIGNIFICANT CHANGE UP (ref 4.8–10.8)
WBC # BLD: 8.07 K/UL — SIGNIFICANT CHANGE UP (ref 4.8–10.8)
WBC # BLD: 8.35 K/UL — SIGNIFICANT CHANGE UP (ref 4.8–10.8)
WBC # BLD: 8.54 K/UL — SIGNIFICANT CHANGE UP (ref 4.8–10.8)
WBC # BLD: 9.13 K/UL — SIGNIFICANT CHANGE UP (ref 4.8–10.8)
WBC # FLD AUTO: 11.36 K/UL — HIGH (ref 4.8–10.8)
WBC # FLD AUTO: 5.12 K/UL — SIGNIFICANT CHANGE UP (ref 4.8–10.8)
WBC # FLD AUTO: 5.22 K/UL — SIGNIFICANT CHANGE UP (ref 4.8–10.8)
WBC # FLD AUTO: 5.23 K/UL — SIGNIFICANT CHANGE UP (ref 4.8–10.8)
WBC # FLD AUTO: 5.41 K/UL — SIGNIFICANT CHANGE UP (ref 4.8–10.8)
WBC # FLD AUTO: 5.45 K/UL — SIGNIFICANT CHANGE UP (ref 4.8–10.8)
WBC # FLD AUTO: 5.49 K/UL — SIGNIFICANT CHANGE UP (ref 4.8–10.8)
WBC # FLD AUTO: 5.64 K/UL — SIGNIFICANT CHANGE UP (ref 4.8–10.8)
WBC # FLD AUTO: 5.7 K/UL — SIGNIFICANT CHANGE UP (ref 4.8–10.8)
WBC # FLD AUTO: 5.76 K/UL — SIGNIFICANT CHANGE UP (ref 4.8–10.8)
WBC # FLD AUTO: 6.22 K/UL — SIGNIFICANT CHANGE UP (ref 4.8–10.8)
WBC # FLD AUTO: 6.33 K/UL — SIGNIFICANT CHANGE UP (ref 4.8–10.8)
WBC # FLD AUTO: 6.5 K/UL — SIGNIFICANT CHANGE UP (ref 4.8–10.8)
WBC # FLD AUTO: 6.5 K/UL — SIGNIFICANT CHANGE UP (ref 4.8–10.8)
WBC # FLD AUTO: 6.72 K/UL — SIGNIFICANT CHANGE UP (ref 4.8–10.8)
WBC # FLD AUTO: 6.76 K/UL — SIGNIFICANT CHANGE UP (ref 4.8–10.8)
WBC # FLD AUTO: 7.1 K/UL — SIGNIFICANT CHANGE UP (ref 4.8–10.8)
WBC # FLD AUTO: 7.46 K/UL
WBC # FLD AUTO: 7.92 K/UL — SIGNIFICANT CHANGE UP (ref 4.8–10.8)
WBC # FLD AUTO: 7.96 K/UL — SIGNIFICANT CHANGE UP (ref 4.8–10.8)
WBC # FLD AUTO: 8.07 K/UL — SIGNIFICANT CHANGE UP (ref 4.8–10.8)
WBC # FLD AUTO: 8.35 K/UL — SIGNIFICANT CHANGE UP (ref 4.8–10.8)
WBC # FLD AUTO: 8.54 K/UL — SIGNIFICANT CHANGE UP (ref 4.8–10.8)
WBC # FLD AUTO: 9.13 K/UL — SIGNIFICANT CHANGE UP (ref 4.8–10.8)
WBC UR QL: 1 /HPF — SIGNIFICANT CHANGE UP (ref 0–5)
WBC UR QL: 4 /HPF — SIGNIFICANT CHANGE UP (ref 0–5)
WHITE BLOOD CELLS URINE: 0 /HPF

## 2022-01-01 PROCEDURE — 99203 OFFICE O/P NEW LOW 30 MIN: CPT

## 2022-01-01 PROCEDURE — 99223 1ST HOSP IP/OBS HIGH 75: CPT

## 2022-01-01 PROCEDURE — 99233 SBSQ HOSP IP/OBS HIGH 50: CPT

## 2022-01-01 PROCEDURE — ZZZZZ: CPT

## 2022-01-01 PROCEDURE — 72125 CT NECK SPINE W/O DYE: CPT | Mod: 26,MA

## 2022-01-01 PROCEDURE — 99214 OFFICE O/P EST MOD 30 MIN: CPT

## 2022-01-01 PROCEDURE — 93306 TTE W/DOPPLER COMPLETE: CPT | Mod: 26

## 2022-01-01 PROCEDURE — 93296 REM INTERROG EVL PM/IDS: CPT

## 2022-01-01 PROCEDURE — 88307 TISSUE EXAM BY PATHOLOGIST: CPT | Mod: 26

## 2022-01-01 PROCEDURE — 93010 ELECTROCARDIOGRAM REPORT: CPT | Mod: 77

## 2022-01-01 PROCEDURE — 71046 X-RAY EXAM CHEST 2 VIEWS: CPT | Mod: 26

## 2022-01-01 PROCEDURE — 14041 TIS TRNFR F/C/C/M/N/A/G/H/F: CPT | Mod: 22

## 2022-01-01 PROCEDURE — 95816 EEG AWAKE AND DROWSY: CPT | Mod: 26

## 2022-01-01 PROCEDURE — 93010 ELECTROCARDIOGRAM REPORT: CPT

## 2022-01-01 PROCEDURE — 93970 EXTREMITY STUDY: CPT | Mod: 26

## 2022-01-01 PROCEDURE — 93284 PRGRMG EVAL IMPLANTABLE DFB: CPT | Mod: 26

## 2022-01-01 PROCEDURE — 71045 X-RAY EXAM CHEST 1 VIEW: CPT | Mod: 26

## 2022-01-01 PROCEDURE — 71260 CT THORAX DX C+: CPT | Mod: 26,MA

## 2022-01-01 PROCEDURE — 95816 EEG AWAKE AND DROWSY: CPT

## 2022-01-01 PROCEDURE — 99291 CRITICAL CARE FIRST HOUR: CPT | Mod: CS,GC

## 2022-01-01 PROCEDURE — 93000 ELECTROCARDIOGRAM COMPLETE: CPT | Mod: 59

## 2022-01-01 PROCEDURE — 73080 X-RAY EXAM OF ELBOW: CPT | Mod: 26,RT,76

## 2022-01-01 PROCEDURE — 73060 X-RAY EXAM OF HUMERUS: CPT | Mod: 26,RT

## 2022-01-01 PROCEDURE — 99497 ADVNCD CARE PLAN 30 MIN: CPT | Mod: 25

## 2022-01-01 PROCEDURE — 73030 X-RAY EXAM OF SHOULDER: CPT | Mod: 26,RT

## 2022-01-01 PROCEDURE — 99232 SBSQ HOSP IP/OBS MODERATE 35: CPT

## 2022-01-01 PROCEDURE — 93290 INTERROG DEV EVAL ICPMS IP: CPT | Mod: 26

## 2022-01-01 PROCEDURE — 93284 PRGRMG EVAL IMPLANTABLE DFB: CPT

## 2022-01-01 PROCEDURE — 72170 X-RAY EXAM OF PELVIS: CPT | Mod: 26

## 2022-01-01 PROCEDURE — 99024 POSTOP FOLLOW-UP VISIT: CPT

## 2022-01-01 PROCEDURE — 73090 X-RAY EXAM OF FOREARM: CPT | Mod: 26,RT

## 2022-01-01 PROCEDURE — 99215 OFFICE O/P EST HI 40 MIN: CPT

## 2022-01-01 PROCEDURE — 70450 CT HEAD/BRAIN W/O DYE: CPT | Mod: 26,MA

## 2022-01-01 PROCEDURE — 90662 IIV NO PRSV INCREASED AG IM: CPT

## 2022-01-01 PROCEDURE — 93295 DEV INTERROG REMOTE 1/2/MLT: CPT

## 2022-01-01 PROCEDURE — 99291 CRITICAL CARE FIRST HOUR: CPT | Mod: 24,25

## 2022-01-01 PROCEDURE — 99284 EMERGENCY DEPT VISIT MOD MDM: CPT | Mod: FS,CS

## 2022-01-01 PROCEDURE — 74177 CT ABD & PELVIS W/CONTRAST: CPT | Mod: 26,MA

## 2022-01-01 PROCEDURE — 73070 X-RAY EXAM OF ELBOW: CPT | Mod: 26,LT

## 2022-01-01 PROCEDURE — G0008: CPT

## 2022-01-01 PROCEDURE — 99222 1ST HOSP IP/OBS MODERATE 55: CPT

## 2022-01-01 PROCEDURE — 99285 EMERGENCY DEPT VISIT HI MDM: CPT | Mod: FS

## 2022-01-01 PROCEDURE — 93290 INTERROG DEV EVAL ICPMS IP: CPT

## 2022-01-01 PROCEDURE — 93306 TTE W/DOPPLER COMPLETE: CPT

## 2022-01-01 PROCEDURE — 99238 HOSP IP/OBS DSCHRG MGMT 30/<: CPT

## 2022-01-01 PROCEDURE — 99239 HOSP IP/OBS DSCHRG MGMT >30: CPT

## 2022-01-01 RX ORDER — AMIODARONE HYDROCHLORIDE 400 MG/1
100 TABLET ORAL DAILY
Refills: 0 | Status: DISCONTINUED | OUTPATIENT
Start: 2022-01-01 | End: 2022-01-01

## 2022-01-01 RX ORDER — CEPHALEXIN 500 MG
500 CAPSULE ORAL EVERY 12 HOURS
Refills: 0 | Status: DISCONTINUED | OUTPATIENT
Start: 2022-01-01 | End: 2022-01-01

## 2022-01-01 RX ORDER — FUROSEMIDE 40 MG
40 TABLET ORAL ONCE
Refills: 0 | Status: COMPLETED | OUTPATIENT
Start: 2022-01-01 | End: 2022-01-01

## 2022-01-01 RX ORDER — LACTULOSE 10 G/15ML
20 SOLUTION ORAL
Refills: 0 | Status: COMPLETED | OUTPATIENT
Start: 2022-01-01 | End: 2022-01-01

## 2022-01-01 RX ORDER — GABAPENTIN 400 MG/1
1 CAPSULE ORAL
Qty: 0 | Refills: 0 | DISCHARGE
Start: 2022-01-01 | End: 2023-11-04

## 2022-01-01 RX ORDER — PANTOPRAZOLE SODIUM 20 MG/1
40 TABLET, DELAYED RELEASE ORAL
Refills: 0 | Status: DISCONTINUED | OUTPATIENT
Start: 2022-01-01 | End: 2022-01-01

## 2022-01-01 RX ORDER — MAGNESIUM SULFATE 500 MG/ML
2 VIAL (ML) INJECTION ONCE
Refills: 0 | Status: COMPLETED | OUTPATIENT
Start: 2022-01-01 | End: 2022-01-01

## 2022-01-01 RX ORDER — DIVALPROEX SODIUM 500 MG/1
250 TABLET, DELAYED RELEASE ORAL AT BEDTIME
Refills: 0 | Status: DISCONTINUED | OUTPATIENT
Start: 2022-01-01 | End: 2022-01-01

## 2022-01-01 RX ORDER — DIVALPROEX SODIUM 250 MG/1
250 TABLET, DELAYED RELEASE ORAL
Qty: 30 | Refills: 2 | Status: ACTIVE | COMMUNITY
Start: 2022-01-01 | End: 1900-01-01

## 2022-01-01 RX ORDER — SENNA PLUS 8.6 MG/1
2 TABLET ORAL AT BEDTIME
Refills: 0 | Status: DISCONTINUED | OUTPATIENT
Start: 2022-01-01 | End: 2022-01-01

## 2022-01-01 RX ORDER — HYDROMORPHONE HYDROCHLORIDE 2 MG/ML
0.5 INJECTION INTRAMUSCULAR; INTRAVENOUS; SUBCUTANEOUS
Refills: 0 | Status: DISCONTINUED | OUTPATIENT
Start: 2022-01-01 | End: 2022-01-01

## 2022-01-01 RX ORDER — CEPHALEXIN 500 MG/1
500 CAPSULE ORAL
Qty: 10 | Refills: 0 | Status: ACTIVE | COMMUNITY
Start: 2022-01-01 | End: 1900-01-01

## 2022-01-01 RX ORDER — QUETIAPINE FUMARATE 25 MG/1
25 TABLET ORAL
Qty: 30 | Refills: 3 | Status: ACTIVE | COMMUNITY
Start: 2022-01-01 | End: 1900-01-01

## 2022-01-01 RX ORDER — DRONABINOL 2.5 MG/1
2.5 CAPSULE ORAL TWICE DAILY
Refills: 0 | Status: DISCONTINUED | COMMUNITY
Start: 2022-01-01 | End: 2022-01-01

## 2022-01-01 RX ORDER — LEVOTHYROXINE SODIUM 125 MCG
25 TABLET ORAL DAILY
Refills: 0 | Status: DISCONTINUED | OUTPATIENT
Start: 2022-01-01 | End: 2022-01-01

## 2022-01-01 RX ORDER — FUROSEMIDE 40 MG
40 TABLET ORAL DAILY
Refills: 0 | Status: DISCONTINUED | OUTPATIENT
Start: 2022-01-01 | End: 2022-01-01

## 2022-01-01 RX ORDER — ACETAMINOPHEN 500 MG
2 TABLET ORAL
Qty: 0 | Refills: 0 | DISCHARGE
Start: 2022-01-01

## 2022-01-01 RX ORDER — SODIUM ZIRCONIUM CYCLOSILICATE 10 G/10G
10 POWDER, FOR SUSPENSION ORAL ONCE
Refills: 0 | Status: COMPLETED | OUTPATIENT
Start: 2022-01-01 | End: 2022-01-01

## 2022-01-01 RX ORDER — SENNA PLUS 8.6 MG/1
2 TABLET ORAL
Qty: 0 | Refills: 0 | DISCHARGE
Start: 2022-01-01 | End: 2023-11-05

## 2022-01-01 RX ORDER — MIDODRINE HYDROCHLORIDE 2.5 MG/1
2.5 TABLET ORAL THREE TIMES A DAY
Refills: 0 | Status: DISCONTINUED | OUTPATIENT
Start: 2022-01-01 | End: 2022-01-01

## 2022-01-01 RX ORDER — SODIUM CHLORIDE 9 MG/ML
1000 INJECTION, SOLUTION INTRAVENOUS ONCE
Refills: 0 | Status: COMPLETED | OUTPATIENT
Start: 2022-01-01 | End: 2022-01-01

## 2022-01-01 RX ORDER — CHLORHEXIDINE GLUCONATE 4 %
5 LIQUID (ML) TOPICAL
Qty: 30 | Refills: 3 | Status: ACTIVE | COMMUNITY
Start: 2022-01-01 | End: 1900-01-01

## 2022-01-01 RX ORDER — BENAZEPRIL HYDROCHLORIDE 10 MG/1
10 TABLET, FILM COATED ORAL DAILY
Refills: 0 | Status: ACTIVE | COMMUNITY

## 2022-01-01 RX ORDER — AMLODIPINE BESYLATE 2.5 MG/1
2.5 TABLET ORAL DAILY
Refills: 0 | Status: DISCONTINUED | OUTPATIENT
Start: 2022-01-01 | End: 2022-01-01

## 2022-01-01 RX ORDER — METOPROLOL TARTRATE 50 MG
25 TABLET ORAL
Refills: 0 | Status: DISCONTINUED | OUTPATIENT
Start: 2022-01-01 | End: 2022-01-01

## 2022-01-01 RX ORDER — LISINOPRIL 2.5 MG/1
40 TABLET ORAL DAILY
Refills: 0 | Status: DISCONTINUED | OUTPATIENT
Start: 2022-01-01 | End: 2022-01-01

## 2022-01-01 RX ORDER — POLYETHYLENE GLYCOL 3350 17 G/17G
17 POWDER, FOR SOLUTION ORAL
Refills: 0 | Status: DISCONTINUED | OUTPATIENT
Start: 2022-01-01 | End: 2022-01-01

## 2022-01-01 RX ORDER — OMEPRAZOLE 40 MG/1
40 CAPSULE, DELAYED RELEASE ORAL
Qty: 30 | Refills: 0 | Status: DISCONTINUED | COMMUNITY
Start: 2021-07-13 | End: 2022-01-01

## 2022-01-01 RX ORDER — DIVALPROEX SODIUM 500 MG/1
1 TABLET, DELAYED RELEASE ORAL
Qty: 0 | Refills: 0 | DISCHARGE
Start: 2022-01-01

## 2022-01-01 RX ORDER — OFLOXACIN 0.3 %
1 DROPS OPHTHALMIC (EYE) THREE TIMES A DAY
Refills: 0 | Status: COMPLETED | OUTPATIENT
Start: 2022-01-01 | End: 2022-01-01

## 2022-01-01 RX ORDER — HEPARIN SODIUM 5000 [USP'U]/ML
5000 INJECTION INTRAVENOUS; SUBCUTANEOUS EVERY 8 HOURS
Refills: 0 | Status: DISCONTINUED | OUTPATIENT
Start: 2022-01-01 | End: 2022-01-01

## 2022-01-01 RX ORDER — CHLORHEXIDINE GLUCONATE 213 G/1000ML
1 SOLUTION TOPICAL
Refills: 0 | Status: DISCONTINUED | OUTPATIENT
Start: 2022-01-01 | End: 2022-01-01

## 2022-01-01 RX ORDER — AMLODIPINE BESYLATE 2.5 MG/1
1 TABLET ORAL
Qty: 0 | Refills: 0 | DISCHARGE

## 2022-01-01 RX ORDER — METOPROLOL TARTRATE 50 MG
1 TABLET ORAL
Qty: 60 | Refills: 0
Start: 2022-01-01 | End: 2022-01-01

## 2022-01-01 RX ORDER — METOPROLOL TARTRATE 25 MG/1
25 TABLET, FILM COATED ORAL TWICE DAILY
Qty: 270 | Refills: 0 | Status: ACTIVE | COMMUNITY
Start: 2022-01-01 | End: 1900-01-01

## 2022-01-01 RX ORDER — SODIUM CHLORIDE 9 MG/ML
250 INJECTION, SOLUTION INTRAVENOUS ONCE
Refills: 0 | Status: COMPLETED | OUTPATIENT
Start: 2022-01-01 | End: 2022-01-01

## 2022-01-01 RX ORDER — ASPIRIN/CALCIUM CARB/MAGNESIUM 324 MG
81 TABLET ORAL DAILY
Refills: 0 | Status: DISCONTINUED | OUTPATIENT
Start: 2022-01-01 | End: 2022-01-01

## 2022-01-01 RX ORDER — LEVOTHYROXINE SODIUM 125 MCG
50 TABLET ORAL DAILY
Refills: 0 | Status: DISCONTINUED | OUTPATIENT
Start: 2022-01-01 | End: 2022-01-01

## 2022-01-01 RX ORDER — PREGABALIN 225 MG/1
1000 CAPSULE ORAL DAILY
Refills: 0 | Status: DISCONTINUED | OUTPATIENT
Start: 2022-01-01 | End: 2022-01-01

## 2022-01-01 RX ORDER — LEVOTHYROXINE SODIUM 0.03 MG/1
25 TABLET ORAL DAILY
Refills: 0 | Status: ACTIVE | COMMUNITY

## 2022-01-01 RX ORDER — MEMANTINE HYDROCHLORIDE 5 MG/1
5 TABLET, FILM COATED ORAL DAILY
Qty: 30 | Refills: 3 | Status: ACTIVE | COMMUNITY
Start: 2022-01-01 | End: 1900-01-01

## 2022-01-01 RX ORDER — HEPARIN SODIUM 5000 [USP'U]/ML
5000 INJECTION INTRAVENOUS; SUBCUTANEOUS EVERY 12 HOURS
Refills: 0 | Status: DISCONTINUED | OUTPATIENT
Start: 2022-01-01 | End: 2022-01-01

## 2022-01-01 RX ORDER — PREGABALIN 225 MG/1
2 CAPSULE ORAL
Qty: 0 | Refills: 0 | DISCHARGE

## 2022-01-01 RX ORDER — PREGABALIN 225 MG/1
1 CAPSULE ORAL
Qty: 0 | Refills: 0 | DISCHARGE

## 2022-01-01 RX ORDER — SODIUM CHLORIDE 9 MG/ML
1000 INJECTION INTRAMUSCULAR; INTRAVENOUS; SUBCUTANEOUS ONCE
Refills: 0 | Status: COMPLETED | OUTPATIENT
Start: 2022-01-01 | End: 2022-01-01

## 2022-01-01 RX ORDER — GABAPENTIN 400 MG/1
100 CAPSULE ORAL THREE TIMES A DAY
Refills: 0 | Status: DISCONTINUED | OUTPATIENT
Start: 2022-01-01 | End: 2022-01-01

## 2022-01-01 RX ORDER — OMEPRAZOLE 20 MG/1
20 CAPSULE, DELAYED RELEASE ORAL DAILY
Refills: 0 | Status: ACTIVE | COMMUNITY

## 2022-01-01 RX ORDER — SODIUM CHLORIDE 9 MG/ML
1000 INJECTION, SOLUTION INTRAVENOUS
Refills: 0 | Status: DISCONTINUED | OUTPATIENT
Start: 2022-01-01 | End: 2022-01-01

## 2022-01-01 RX ORDER — LEVOTHYROXINE SODIUM 125 MCG
1 TABLET ORAL
Qty: 0 | Refills: 0 | DISCHARGE

## 2022-01-01 RX ORDER — OLANZAPINE 15 MG/1
2.5 TABLET, FILM COATED ORAL ONCE
Refills: 0 | Status: COMPLETED | OUTPATIENT
Start: 2022-01-01 | End: 2022-01-01

## 2022-01-01 RX ORDER — SODIUM CHLORIDE 9 MG/ML
500 INJECTION, SOLUTION INTRAVENOUS ONCE
Refills: 0 | Status: DISCONTINUED | OUTPATIENT
Start: 2022-01-01 | End: 2022-01-01

## 2022-01-01 RX ORDER — TETANUS AND DIPHTHERIA TOXOIDS ADSORBED 2; 2 [LF]/.5ML; [LF]/.5ML
0.5 INJECTION INTRAMUSCULAR ONCE
Refills: 0 | Status: DISCONTINUED | OUTPATIENT
Start: 2022-01-01 | End: 2022-01-01

## 2022-01-01 RX ORDER — MEMANTINE HYDROCHLORIDE 10 MG/1
5 TABLET ORAL DAILY
Refills: 0 | Status: DISCONTINUED | OUTPATIENT
Start: 2022-01-01 | End: 2022-01-01

## 2022-01-01 RX ORDER — VANCOMYCIN HCL 1 G
1000 VIAL (EA) INTRAVENOUS ONCE
Refills: 0 | Status: DISCONTINUED | OUTPATIENT
Start: 2022-01-01 | End: 2022-01-01

## 2022-01-01 RX ORDER — ACETAMINOPHEN 500 MG
650 TABLET ORAL EVERY 6 HOURS
Refills: 0 | Status: DISCONTINUED | OUTPATIENT
Start: 2022-01-01 | End: 2022-01-01

## 2022-01-01 RX ORDER — LABETALOL HCL 100 MG
100 TABLET ORAL
Refills: 0 | Status: DISCONTINUED | OUTPATIENT
Start: 2022-01-01 | End: 2022-01-01

## 2022-01-01 RX ORDER — LABETALOL HYDROCHLORIDE 100 MG/1
100 TABLET, FILM COATED ORAL
Qty: 180 | Refills: 1 | Status: DISCONTINUED | COMMUNITY
Start: 2022-01-01 | End: 2022-01-01

## 2022-01-01 RX ORDER — LISINOPRIL 2.5 MG/1
10 TABLET ORAL DAILY
Refills: 0 | Status: DISCONTINUED | OUTPATIENT
Start: 2022-01-01 | End: 2022-01-01

## 2022-01-01 RX ORDER — MIDODRINE HYDROCHLORIDE 2.5 MG/1
10 TABLET ORAL EVERY 8 HOURS
Refills: 0 | Status: DISCONTINUED | OUTPATIENT
Start: 2022-01-01 | End: 2022-01-01

## 2022-01-01 RX ORDER — TETANUS TOXOID, REDUCED DIPHTHERIA TOXOID AND ACELLULAR PERTUSSIS VACCINE, ADSORBED 5; 2.5; 8; 8; 2.5 [IU]/.5ML; [IU]/.5ML; UG/.5ML; UG/.5ML; UG/.5ML
0.5 SUSPENSION INTRAMUSCULAR ONCE
Refills: 0 | Status: COMPLETED | OUTPATIENT
Start: 2022-01-01 | End: 2022-01-01

## 2022-01-01 RX ORDER — LINACLOTIDE 290 UG/1
290 CAPSULE, GELATIN COATED ORAL DAILY
Refills: 0 | Status: ACTIVE | COMMUNITY
Start: 2022-01-01

## 2022-01-01 RX ORDER — SILVER SULFADIAZINE 10 MG/G
1 CREAM TOPICAL
Qty: 50 | Refills: 0 | Status: ACTIVE | COMMUNITY
Start: 2022-01-01

## 2022-01-01 RX ORDER — SODIUM CHLORIDE 9 MG/ML
500 INJECTION, SOLUTION INTRAVENOUS ONCE
Refills: 0 | Status: COMPLETED | OUTPATIENT
Start: 2022-01-01 | End: 2022-01-01

## 2022-01-01 RX ORDER — MIDODRINE HYDROCHLORIDE 2.5 MG/1
1 TABLET ORAL
Qty: 90 | Refills: 0
Start: 2022-01-01 | End: 2022-01-01

## 2022-01-01 RX ORDER — MORPHINE SULFATE 50 MG/1
5 CAPSULE, EXTENDED RELEASE ORAL EVERY 4 HOURS
Refills: 0 | Status: DISCONTINUED | OUTPATIENT
Start: 2022-01-01 | End: 2022-01-01

## 2022-01-01 RX ORDER — ONDANSETRON 8 MG/1
4 TABLET, FILM COATED ORAL ONCE
Refills: 0 | Status: DISCONTINUED | OUTPATIENT
Start: 2022-01-01 | End: 2022-01-01

## 2022-01-01 RX ORDER — CEPHALEXIN 500 MG
1 CAPSULE ORAL
Qty: 10 | Refills: 0
Start: 2022-01-01 | End: 2022-01-01

## 2022-01-01 RX ORDER — DIVALPROEX SODIUM 125 MG/1
125 TABLET, DELAYED RELEASE ORAL 3 TIMES DAILY
Qty: 90 | Refills: 0 | Status: ACTIVE | COMMUNITY
Start: 2022-01-01 | End: 1900-01-01

## 2022-01-01 RX ORDER — FUROSEMIDE 40 MG
20 TABLET ORAL ONCE
Refills: 0 | Status: COMPLETED | OUTPATIENT
Start: 2022-01-01 | End: 2022-01-01

## 2022-01-01 RX ORDER — MEMANTINE HYDROCHLORIDE 10 MG/1
1 TABLET ORAL
Qty: 0 | Refills: 0 | DISCHARGE
Start: 2022-01-01

## 2022-01-01 RX ORDER — LANOLIN ALCOHOL/MO/W.PET/CERES
5 CREAM (GRAM) TOPICAL AT BEDTIME
Refills: 0 | Status: DISCONTINUED | OUTPATIENT
Start: 2022-01-01 | End: 2022-01-01

## 2022-01-01 RX ORDER — MEMANTINE HYDROCHLORIDE 10 MG/1
1 TABLET ORAL
Qty: 0 | Refills: 0 | DISCHARGE

## 2022-01-01 RX ORDER — METOPROLOL TARTRATE 50 MG
25 TABLET ORAL EVERY 12 HOURS
Refills: 0 | Status: DISCONTINUED | OUTPATIENT
Start: 2022-01-01 | End: 2022-01-01

## 2022-01-01 RX ORDER — OXYCODONE AND ACETAMINOPHEN 5; 325 MG/1; MG/1
1 TABLET ORAL ONCE
Refills: 0 | Status: DISCONTINUED | OUTPATIENT
Start: 2022-01-01 | End: 2022-01-01

## 2022-01-01 RX ORDER — MORPHINE SULFATE 50 MG/1
5 CAPSULE, EXTENDED RELEASE ORAL
Qty: 0 | Refills: 0 | DISCHARGE
Start: 2022-01-01 | End: 2023-11-04

## 2022-01-01 RX ORDER — CEFEPIME 1 G/1
2000 INJECTION, POWDER, FOR SOLUTION INTRAMUSCULAR; INTRAVENOUS ONCE
Refills: 0 | Status: DISCONTINUED | OUTPATIENT
Start: 2022-01-01 | End: 2022-01-01

## 2022-01-01 RX ORDER — LEVOTHYROXINE SODIUM 125 MCG
1 TABLET ORAL
Qty: 0 | Refills: 0 | DISCHARGE
Start: 2022-01-01

## 2022-01-01 RX ORDER — LABETALOL HCL 100 MG
1 TABLET ORAL
Qty: 0 | Refills: 0 | DISCHARGE

## 2022-01-01 RX ORDER — PANTOPRAZOLE SODIUM 20 MG/1
1 TABLET, DELAYED RELEASE ORAL
Qty: 0 | Refills: 0 | DISCHARGE
Start: 2022-01-01

## 2022-01-01 RX ORDER — BENAZEPRIL HYDROCHLORIDE 40 MG/1
1 TABLET ORAL
Qty: 30 | Refills: 0
Start: 2022-01-01 | End: 2022-01-01

## 2022-01-01 RX ORDER — OMEPRAZOLE 10 MG/1
1 CAPSULE, DELAYED RELEASE ORAL
Qty: 0 | Refills: 0 | DISCHARGE

## 2022-01-01 RX ORDER — LISINOPRIL 2.5 MG/1
20 TABLET ORAL DAILY
Refills: 0 | Status: DISCONTINUED | OUTPATIENT
Start: 2022-01-01 | End: 2022-01-01

## 2022-01-01 RX ORDER — MIRTAZAPINE 30 MG/1
30 TABLET, FILM COATED ORAL
Qty: 30 | Refills: 0 | Status: DISCONTINUED | COMMUNITY
Start: 2022-01-01 | End: 2022-01-01

## 2022-01-01 RX ORDER — ASPIRIN/CALCIUM CARB/MAGNESIUM 324 MG
1 TABLET ORAL
Qty: 0 | Refills: 0 | DISCHARGE

## 2022-01-01 RX ORDER — AMIODARONE HYDROCHLORIDE 400 MG/1
1 TABLET ORAL
Qty: 0 | Refills: 0 | DISCHARGE

## 2022-01-01 RX ORDER — DIVALPROEX SODIUM 500 MG/1
125 TABLET, DELAYED RELEASE ORAL EVERY 12 HOURS
Refills: 0 | Status: DISCONTINUED | OUTPATIENT
Start: 2022-01-01 | End: 2022-01-01

## 2022-01-01 RX ORDER — LIDOCAINE 4 G/100G
1 CREAM TOPICAL DAILY
Refills: 0 | Status: DISCONTINUED | OUTPATIENT
Start: 2022-01-01 | End: 2022-01-01

## 2022-01-01 RX ORDER — ACETAMINOPHEN 500 MG
650 TABLET ORAL ONCE
Refills: 0 | Status: COMPLETED | OUTPATIENT
Start: 2022-01-01 | End: 2022-01-01

## 2022-01-01 RX ORDER — ACETAMINOPHEN 500 MG
1000 TABLET ORAL ONCE
Refills: 0 | Status: DISCONTINUED | OUTPATIENT
Start: 2022-01-01 | End: 2022-01-01

## 2022-01-01 RX ORDER — AMLODIPINE BESYLATE 2.5 MG/1
2.5 TABLET ORAL DAILY
Qty: 90 | Refills: 3 | Status: DISCONTINUED | COMMUNITY
Start: 2022-01-01 | End: 2022-01-01

## 2022-01-01 RX ORDER — CEFAZOLIN SODIUM 1 G
2000 VIAL (EA) INJECTION EVERY 8 HOURS
Refills: 0 | Status: COMPLETED | OUTPATIENT
Start: 2022-01-01 | End: 2022-01-01

## 2022-01-01 RX ORDER — SENNOSIDES 8.6 MG TABLETS 8.6 MG/1
8.6 TABLET ORAL
Refills: 0 | Status: ACTIVE | COMMUNITY
Start: 2022-01-01

## 2022-01-01 RX ORDER — BENAZEPRIL HYDROCHLORIDE 40 MG/1
1 TABLET ORAL
Qty: 0 | Refills: 0 | DISCHARGE

## 2022-01-01 RX ORDER — CHLORHEXIDINE GLUCONATE 213 G/1000ML
1 SOLUTION TOPICAL EVERY 12 HOURS
Refills: 0 | Status: COMPLETED | OUTPATIENT
Start: 2022-01-01 | End: 2022-01-01

## 2022-01-01 RX ORDER — LIDOCAINE 4 G/100G
1 CREAM TOPICAL
Qty: 0 | Refills: 0 | DISCHARGE
Start: 2022-01-01 | End: 2023-11-01

## 2022-01-01 RX ORDER — LANOLIN ALCOHOL/MO/W.PET/CERES
1 CREAM (GRAM) TOPICAL
Qty: 0 | Refills: 0 | DISCHARGE

## 2022-01-01 RX ORDER — MORPHINE SULFATE 50 MG/1
2 CAPSULE, EXTENDED RELEASE ORAL
Refills: 0 | Status: DISCONTINUED | OUTPATIENT
Start: 2022-01-01 | End: 2022-01-01

## 2022-01-01 RX ADMIN — CHLORHEXIDINE GLUCONATE 1 APPLICATION(S): 213 SOLUTION TOPICAL at 05:33

## 2022-01-01 RX ADMIN — SENNA PLUS 2 TABLET(S): 8.6 TABLET ORAL at 22:24

## 2022-01-01 RX ADMIN — Medication 650 MILLIGRAM(S): at 05:57

## 2022-01-01 RX ADMIN — Medication 81 MILLIGRAM(S): at 12:05

## 2022-01-01 RX ADMIN — HEPARIN SODIUM 5000 UNIT(S): 5000 INJECTION INTRAVENOUS; SUBCUTANEOUS at 21:07

## 2022-01-01 RX ADMIN — HEPARIN SODIUM 5000 UNIT(S): 5000 INJECTION INTRAVENOUS; SUBCUTANEOUS at 22:15

## 2022-01-01 RX ADMIN — Medication 5 MILLIGRAM(S): at 22:10

## 2022-01-01 RX ADMIN — SODIUM ZIRCONIUM CYCLOSILICATE 10 GRAM(S): 10 POWDER, FOR SUSPENSION ORAL at 12:22

## 2022-01-01 RX ADMIN — DIVALPROEX SODIUM 125 MILLIGRAM(S): 500 TABLET, DELAYED RELEASE ORAL at 06:56

## 2022-01-01 RX ADMIN — POLYETHYLENE GLYCOL 3350 17 GRAM(S): 17 POWDER, FOR SOLUTION ORAL at 17:48

## 2022-01-01 RX ADMIN — SENNA PLUS 2 TABLET(S): 8.6 TABLET ORAL at 21:25

## 2022-01-01 RX ADMIN — AMIODARONE HYDROCHLORIDE 100 MILLIGRAM(S): 400 TABLET ORAL at 05:08

## 2022-01-01 RX ADMIN — HEPARIN SODIUM 5000 UNIT(S): 5000 INJECTION INTRAVENOUS; SUBCUTANEOUS at 18:27

## 2022-01-01 RX ADMIN — SENNA PLUS 2 TABLET(S): 8.6 TABLET ORAL at 23:28

## 2022-01-01 RX ADMIN — HEPARIN SODIUM 5000 UNIT(S): 5000 INJECTION INTRAVENOUS; SUBCUTANEOUS at 07:09

## 2022-01-01 RX ADMIN — DIVALPROEX SODIUM 125 MILLIGRAM(S): 500 TABLET, DELAYED RELEASE ORAL at 05:27

## 2022-01-01 RX ADMIN — MIDODRINE HYDROCHLORIDE 10 MILLIGRAM(S): 2.5 TABLET ORAL at 21:07

## 2022-01-01 RX ADMIN — Medication 650 MILLIGRAM(S): at 18:20

## 2022-01-01 RX ADMIN — SENNA PLUS 2 TABLET(S): 8.6 TABLET ORAL at 22:00

## 2022-01-01 RX ADMIN — LISINOPRIL 40 MILLIGRAM(S): 2.5 TABLET ORAL at 05:32

## 2022-01-01 RX ADMIN — Medication 650 MILLIGRAM(S): at 22:54

## 2022-01-01 RX ADMIN — Medication 1 DROP(S): at 22:24

## 2022-01-01 RX ADMIN — HEPARIN SODIUM 5000 UNIT(S): 5000 INJECTION INTRAVENOUS; SUBCUTANEOUS at 23:29

## 2022-01-01 RX ADMIN — Medication 100 MILLIGRAM(S): at 05:08

## 2022-01-01 RX ADMIN — GABAPENTIN 100 MILLIGRAM(S): 400 CAPSULE ORAL at 21:53

## 2022-01-01 RX ADMIN — Medication 25 MILLIGRAM(S): at 05:33

## 2022-01-01 RX ADMIN — LISINOPRIL 40 MILLIGRAM(S): 2.5 TABLET ORAL at 05:42

## 2022-01-01 RX ADMIN — PANTOPRAZOLE SODIUM 40 MILLIGRAM(S): 20 TABLET, DELAYED RELEASE ORAL at 05:32

## 2022-01-01 RX ADMIN — Medication 650 MILLIGRAM(S): at 17:16

## 2022-01-01 RX ADMIN — LIDOCAINE 1 PATCH: 4 CREAM TOPICAL at 13:45

## 2022-01-01 RX ADMIN — PREGABALIN 1000 MICROGRAM(S): 225 CAPSULE ORAL at 11:34

## 2022-01-01 RX ADMIN — MEMANTINE HYDROCHLORIDE 5 MILLIGRAM(S): 10 TABLET ORAL at 13:48

## 2022-01-01 RX ADMIN — GABAPENTIN 100 MILLIGRAM(S): 400 CAPSULE ORAL at 13:19

## 2022-01-01 RX ADMIN — PANTOPRAZOLE SODIUM 40 MILLIGRAM(S): 20 TABLET, DELAYED RELEASE ORAL at 05:21

## 2022-01-01 RX ADMIN — HEPARIN SODIUM 5000 UNIT(S): 5000 INJECTION INTRAVENOUS; SUBCUTANEOUS at 05:32

## 2022-01-01 RX ADMIN — Medication 5 MILLIGRAM(S): at 22:24

## 2022-01-01 RX ADMIN — AMIODARONE HYDROCHLORIDE 100 MILLIGRAM(S): 400 TABLET ORAL at 05:32

## 2022-01-01 RX ADMIN — TETANUS TOXOID, REDUCED DIPHTHERIA TOXOID AND ACELLULAR PERTUSSIS VACCINE, ADSORBED 0.5 MILLILITER(S): 5; 2.5; 8; 8; 2.5 SUSPENSION INTRAMUSCULAR at 10:28

## 2022-01-01 RX ADMIN — Medication 20 MILLIGRAM(S): at 11:01

## 2022-01-01 RX ADMIN — MIDODRINE HYDROCHLORIDE 10 MILLIGRAM(S): 2.5 TABLET ORAL at 15:09

## 2022-01-01 RX ADMIN — Medication 650 MILLIGRAM(S): at 04:06

## 2022-01-01 RX ADMIN — AMIODARONE HYDROCHLORIDE 100 MILLIGRAM(S): 400 TABLET ORAL at 05:27

## 2022-01-01 RX ADMIN — Medication 81 MILLIGRAM(S): at 11:28

## 2022-01-01 RX ADMIN — HEPARIN SODIUM 5000 UNIT(S): 5000 INJECTION INTRAVENOUS; SUBCUTANEOUS at 22:11

## 2022-01-01 RX ADMIN — Medication 25 MICROGRAM(S): at 05:38

## 2022-01-01 RX ADMIN — Medication 650 MILLIGRAM(S): at 14:01

## 2022-01-01 RX ADMIN — CHLORHEXIDINE GLUCONATE 1 APPLICATION(S): 213 SOLUTION TOPICAL at 06:09

## 2022-01-01 RX ADMIN — MIDODRINE HYDROCHLORIDE 2.5 MILLIGRAM(S): 2.5 TABLET ORAL at 05:23

## 2022-01-01 RX ADMIN — Medication 500 MILLIGRAM(S): at 05:36

## 2022-01-01 RX ADMIN — AMIODARONE HYDROCHLORIDE 100 MILLIGRAM(S): 400 TABLET ORAL at 05:42

## 2022-01-01 RX ADMIN — Medication 650 MILLIGRAM(S): at 12:35

## 2022-01-01 RX ADMIN — LIDOCAINE 1 PATCH: 4 CREAM TOPICAL at 18:59

## 2022-01-01 RX ADMIN — Medication 50 MICROGRAM(S): at 05:57

## 2022-01-01 RX ADMIN — MIDODRINE HYDROCHLORIDE 10 MILLIGRAM(S): 2.5 TABLET ORAL at 17:47

## 2022-01-01 RX ADMIN — AMIODARONE HYDROCHLORIDE 100 MILLIGRAM(S): 400 TABLET ORAL at 05:21

## 2022-01-01 RX ADMIN — GABAPENTIN 100 MILLIGRAM(S): 400 CAPSULE ORAL at 06:12

## 2022-01-01 RX ADMIN — PREGABALIN 1000 MICROGRAM(S): 225 CAPSULE ORAL at 11:06

## 2022-01-01 RX ADMIN — MIDODRINE HYDROCHLORIDE 2.5 MILLIGRAM(S): 2.5 TABLET ORAL at 12:06

## 2022-01-01 RX ADMIN — Medication 1 DROP(S): at 14:06

## 2022-01-01 RX ADMIN — HEPARIN SODIUM 5000 UNIT(S): 5000 INJECTION INTRAVENOUS; SUBCUTANEOUS at 17:09

## 2022-01-01 RX ADMIN — Medication 500 MILLIGRAM(S): at 18:58

## 2022-01-01 RX ADMIN — MIDODRINE HYDROCHLORIDE 2.5 MILLIGRAM(S): 2.5 TABLET ORAL at 12:38

## 2022-01-01 RX ADMIN — Medication 650 MILLIGRAM(S): at 11:34

## 2022-01-01 RX ADMIN — Medication 650 MILLIGRAM(S): at 23:29

## 2022-01-01 RX ADMIN — SENNA PLUS 2 TABLET(S): 8.6 TABLET ORAL at 22:10

## 2022-01-01 RX ADMIN — Medication 25 MICROGRAM(S): at 06:11

## 2022-01-01 RX ADMIN — MEMANTINE HYDROCHLORIDE 5 MILLIGRAM(S): 10 TABLET ORAL at 22:11

## 2022-01-01 RX ADMIN — MIDODRINE HYDROCHLORIDE 10 MILLIGRAM(S): 2.5 TABLET ORAL at 05:39

## 2022-01-01 RX ADMIN — HEPARIN SODIUM 5000 UNIT(S): 5000 INJECTION INTRAVENOUS; SUBCUTANEOUS at 05:38

## 2022-01-01 RX ADMIN — Medication 650 MILLIGRAM(S): at 00:40

## 2022-01-01 RX ADMIN — SODIUM CHLORIDE 50 MILLILITER(S): 9 INJECTION, SOLUTION INTRAVENOUS at 23:57

## 2022-01-01 RX ADMIN — Medication 500 MILLIGRAM(S): at 06:24

## 2022-01-01 RX ADMIN — HEPARIN SODIUM 5000 UNIT(S): 5000 INJECTION INTRAVENOUS; SUBCUTANEOUS at 06:23

## 2022-01-01 RX ADMIN — LIDOCAINE 1 PATCH: 4 CREAM TOPICAL at 14:26

## 2022-01-01 RX ADMIN — Medication 500 MILLIGRAM(S): at 18:30

## 2022-01-01 RX ADMIN — GABAPENTIN 100 MILLIGRAM(S): 400 CAPSULE ORAL at 15:10

## 2022-01-01 RX ADMIN — SODIUM CHLORIDE 50 MILLILITER(S): 9 INJECTION, SOLUTION INTRAVENOUS at 04:02

## 2022-01-01 RX ADMIN — SODIUM CHLORIDE 100 MILLILITER(S): 9 INJECTION, SOLUTION INTRAVENOUS at 05:14

## 2022-01-01 RX ADMIN — HEPARIN SODIUM 5000 UNIT(S): 5000 INJECTION INTRAVENOUS; SUBCUTANEOUS at 05:26

## 2022-01-01 RX ADMIN — HEPARIN SODIUM 5000 UNIT(S): 5000 INJECTION INTRAVENOUS; SUBCUTANEOUS at 05:42

## 2022-01-01 RX ADMIN — Medication 650 MILLIGRAM(S): at 19:24

## 2022-01-01 RX ADMIN — Medication 25 MILLIGRAM(S): at 17:41

## 2022-01-01 RX ADMIN — CHLORHEXIDINE GLUCONATE 1 APPLICATION(S): 213 SOLUTION TOPICAL at 18:58

## 2022-01-01 RX ADMIN — PANTOPRAZOLE SODIUM 40 MILLIGRAM(S): 20 TABLET, DELAYED RELEASE ORAL at 05:39

## 2022-01-01 RX ADMIN — DIVALPROEX SODIUM 125 MILLIGRAM(S): 500 TABLET, DELAYED RELEASE ORAL at 05:38

## 2022-01-01 RX ADMIN — SODIUM CHLORIDE 250 MILLILITER(S): 9 INJECTION, SOLUTION INTRAVENOUS at 05:13

## 2022-01-01 RX ADMIN — LIDOCAINE 1 PATCH: 4 CREAM TOPICAL at 12:06

## 2022-01-01 RX ADMIN — POLYETHYLENE GLYCOL 3350 17 GRAM(S): 17 POWDER, FOR SOLUTION ORAL at 17:10

## 2022-01-01 RX ADMIN — SODIUM CHLORIDE 75 MILLILITER(S): 9 INJECTION, SOLUTION INTRAVENOUS at 19:42

## 2022-01-01 RX ADMIN — SENNA PLUS 2 TABLET(S): 8.6 TABLET ORAL at 22:13

## 2022-01-01 RX ADMIN — HEPARIN SODIUM 5000 UNIT(S): 5000 INJECTION INTRAVENOUS; SUBCUTANEOUS at 06:21

## 2022-01-01 RX ADMIN — DIVALPROEX SODIUM 250 MILLIGRAM(S): 500 TABLET, DELAYED RELEASE ORAL at 22:15

## 2022-01-01 RX ADMIN — SENNA PLUS 2 TABLET(S): 8.6 TABLET ORAL at 22:11

## 2022-01-01 RX ADMIN — HEPARIN SODIUM 5000 UNIT(S): 5000 INJECTION INTRAVENOUS; SUBCUTANEOUS at 17:15

## 2022-01-01 RX ADMIN — PREGABALIN 1000 MICROGRAM(S): 225 CAPSULE ORAL at 11:28

## 2022-01-01 RX ADMIN — DIVALPROEX SODIUM 250 MILLIGRAM(S): 500 TABLET, DELAYED RELEASE ORAL at 21:53

## 2022-01-01 RX ADMIN — SENNA PLUS 2 TABLET(S): 8.6 TABLET ORAL at 21:07

## 2022-01-01 RX ADMIN — Medication 25 MILLIGRAM(S): at 17:48

## 2022-01-01 RX ADMIN — Medication 650 MILLIGRAM(S): at 18:59

## 2022-01-01 RX ADMIN — PANTOPRAZOLE SODIUM 40 MILLIGRAM(S): 20 TABLET, DELAYED RELEASE ORAL at 05:26

## 2022-01-01 RX ADMIN — PREGABALIN 1000 MICROGRAM(S): 225 CAPSULE ORAL at 11:53

## 2022-01-01 RX ADMIN — Medication 100 MILLIGRAM(S): at 17:26

## 2022-01-01 RX ADMIN — PREGABALIN 1000 MICROGRAM(S): 225 CAPSULE ORAL at 12:06

## 2022-01-01 RX ADMIN — Medication 40 MILLIGRAM(S): at 13:48

## 2022-01-01 RX ADMIN — HEPARIN SODIUM 5000 UNIT(S): 5000 INJECTION INTRAVENOUS; SUBCUTANEOUS at 15:08

## 2022-01-01 RX ADMIN — Medication 500 MILLIGRAM(S): at 17:43

## 2022-01-01 RX ADMIN — Medication 650 MILLIGRAM(S): at 17:59

## 2022-01-01 RX ADMIN — Medication 650 MILLIGRAM(S): at 13:43

## 2022-01-01 RX ADMIN — AMIODARONE HYDROCHLORIDE 100 MILLIGRAM(S): 400 TABLET ORAL at 05:24

## 2022-01-01 RX ADMIN — GABAPENTIN 100 MILLIGRAM(S): 400 CAPSULE ORAL at 21:34

## 2022-01-01 RX ADMIN — Medication 1 DROP(S): at 05:39

## 2022-01-01 RX ADMIN — Medication 650 MILLIGRAM(S): at 05:27

## 2022-01-01 RX ADMIN — MIDODRINE HYDROCHLORIDE 2.5 MILLIGRAM(S): 2.5 TABLET ORAL at 22:32

## 2022-01-01 RX ADMIN — GABAPENTIN 100 MILLIGRAM(S): 400 CAPSULE ORAL at 06:27

## 2022-01-01 RX ADMIN — POLYETHYLENE GLYCOL 3350 17 GRAM(S): 17 POWDER, FOR SOLUTION ORAL at 05:33

## 2022-01-01 RX ADMIN — GABAPENTIN 100 MILLIGRAM(S): 400 CAPSULE ORAL at 05:57

## 2022-01-01 RX ADMIN — LIDOCAINE 1 PATCH: 4 CREAM TOPICAL at 13:01

## 2022-01-01 RX ADMIN — PREGABALIN 1000 MICROGRAM(S): 225 CAPSULE ORAL at 12:33

## 2022-01-01 RX ADMIN — Medication 100 MILLIGRAM(S): at 05:25

## 2022-01-01 RX ADMIN — SODIUM CHLORIDE 100 MILLILITER(S): 9 INJECTION, SOLUTION INTRAVENOUS at 10:10

## 2022-01-01 RX ADMIN — CHLORHEXIDINE GLUCONATE 1 APPLICATION(S): 213 SOLUTION TOPICAL at 05:21

## 2022-01-01 RX ADMIN — LIDOCAINE 1 PATCH: 4 CREAM TOPICAL at 19:00

## 2022-01-01 RX ADMIN — Medication 1 DROP(S): at 17:15

## 2022-01-01 RX ADMIN — Medication 650 MILLIGRAM(S): at 12:20

## 2022-01-01 RX ADMIN — GABAPENTIN 100 MILLIGRAM(S): 400 CAPSULE ORAL at 22:11

## 2022-01-01 RX ADMIN — GABAPENTIN 100 MILLIGRAM(S): 400 CAPSULE ORAL at 15:08

## 2022-01-01 RX ADMIN — AMIODARONE HYDROCHLORIDE 100 MILLIGRAM(S): 400 TABLET ORAL at 07:11

## 2022-01-01 RX ADMIN — SODIUM CHLORIDE 1000 MILLILITER(S): 9 INJECTION INTRAMUSCULAR; INTRAVENOUS; SUBCUTANEOUS at 20:13

## 2022-01-01 RX ADMIN — OLANZAPINE 2.5 MILLIGRAM(S): 15 TABLET, FILM COATED ORAL at 02:13

## 2022-01-01 RX ADMIN — MEMANTINE HYDROCHLORIDE 5 MILLIGRAM(S): 10 TABLET ORAL at 13:01

## 2022-01-01 RX ADMIN — PREGABALIN 1000 MICROGRAM(S): 225 CAPSULE ORAL at 12:26

## 2022-01-01 RX ADMIN — Medication 25 MILLIGRAM(S): at 05:23

## 2022-01-01 RX ADMIN — Medication 650 MILLIGRAM(S): at 23:20

## 2022-01-01 RX ADMIN — CHLORHEXIDINE GLUCONATE 1 APPLICATION(S): 213 SOLUTION TOPICAL at 05:27

## 2022-01-01 RX ADMIN — HEPARIN SODIUM 5000 UNIT(S): 5000 INJECTION INTRAVENOUS; SUBCUTANEOUS at 17:13

## 2022-01-01 RX ADMIN — DIVALPROEX SODIUM 125 MILLIGRAM(S): 500 TABLET, DELAYED RELEASE ORAL at 18:58

## 2022-01-01 RX ADMIN — LISINOPRIL 40 MILLIGRAM(S): 2.5 TABLET ORAL at 05:33

## 2022-01-01 RX ADMIN — Medication 650 MILLIGRAM(S): at 18:40

## 2022-01-01 RX ADMIN — Medication 650 MILLIGRAM(S): at 15:07

## 2022-01-01 RX ADMIN — MEMANTINE HYDROCHLORIDE 5 MILLIGRAM(S): 10 TABLET ORAL at 14:26

## 2022-01-01 RX ADMIN — Medication 500 MILLIGRAM(S): at 17:51

## 2022-01-01 RX ADMIN — POLYETHYLENE GLYCOL 3350 17 GRAM(S): 17 POWDER, FOR SOLUTION ORAL at 17:13

## 2022-01-01 RX ADMIN — MIDODRINE HYDROCHLORIDE 2.5 MILLIGRAM(S): 2.5 TABLET ORAL at 05:48

## 2022-01-01 RX ADMIN — SODIUM CHLORIDE 50 MILLILITER(S): 9 INJECTION, SOLUTION INTRAVENOUS at 17:41

## 2022-01-01 RX ADMIN — LIDOCAINE 1 PATCH: 4 CREAM TOPICAL at 21:56

## 2022-01-01 RX ADMIN — PREGABALIN 1000 MICROGRAM(S): 225 CAPSULE ORAL at 11:42

## 2022-01-01 RX ADMIN — LIDOCAINE 1 PATCH: 4 CREAM TOPICAL at 11:34

## 2022-01-01 RX ADMIN — Medication 100 MILLIGRAM(S): at 06:20

## 2022-01-01 RX ADMIN — Medication 81 MILLIGRAM(S): at 12:24

## 2022-01-01 RX ADMIN — LISINOPRIL 40 MILLIGRAM(S): 2.5 TABLET ORAL at 05:23

## 2022-01-01 RX ADMIN — DIVALPROEX SODIUM 125 MILLIGRAM(S): 500 TABLET, DELAYED RELEASE ORAL at 06:27

## 2022-01-01 RX ADMIN — Medication 100 MILLIGRAM(S): at 05:32

## 2022-01-01 RX ADMIN — MIDODRINE HYDROCHLORIDE 2.5 MILLIGRAM(S): 2.5 TABLET ORAL at 05:43

## 2022-01-01 RX ADMIN — GABAPENTIN 100 MILLIGRAM(S): 400 CAPSULE ORAL at 13:01

## 2022-01-01 RX ADMIN — CHLORHEXIDINE GLUCONATE 1 APPLICATION(S): 213 SOLUTION TOPICAL at 05:34

## 2022-01-01 RX ADMIN — Medication 1 DROP(S): at 15:08

## 2022-01-01 RX ADMIN — Medication 650 MILLIGRAM(S): at 12:06

## 2022-01-01 RX ADMIN — Medication 650 MILLIGRAM(S): at 13:00

## 2022-01-01 RX ADMIN — GABAPENTIN 100 MILLIGRAM(S): 400 CAPSULE ORAL at 13:28

## 2022-01-01 RX ADMIN — HEPARIN SODIUM 5000 UNIT(S): 5000 INJECTION INTRAVENOUS; SUBCUTANEOUS at 06:35

## 2022-01-01 RX ADMIN — HEPARIN SODIUM 5000 UNIT(S): 5000 INJECTION INTRAVENOUS; SUBCUTANEOUS at 13:19

## 2022-01-01 RX ADMIN — DIVALPROEX SODIUM 125 MILLIGRAM(S): 500 TABLET, DELAYED RELEASE ORAL at 17:16

## 2022-01-01 RX ADMIN — AMIODARONE HYDROCHLORIDE 100 MILLIGRAM(S): 400 TABLET ORAL at 06:20

## 2022-01-01 RX ADMIN — Medication 650 MILLIGRAM(S): at 06:27

## 2022-01-01 RX ADMIN — SODIUM CHLORIDE 1000 MILLILITER(S): 9 INJECTION INTRAMUSCULAR; INTRAVENOUS; SUBCUTANEOUS at 17:09

## 2022-01-01 RX ADMIN — Medication 650 MILLIGRAM(S): at 17:51

## 2022-01-01 RX ADMIN — CHLORHEXIDINE GLUCONATE 1 APPLICATION(S): 213 SOLUTION TOPICAL at 05:42

## 2022-01-01 RX ADMIN — MORPHINE SULFATE 5 MILLIGRAM(S): 50 CAPSULE, EXTENDED RELEASE ORAL at 12:10

## 2022-01-01 RX ADMIN — Medication 81 MILLIGRAM(S): at 12:33

## 2022-01-01 RX ADMIN — LIDOCAINE 1 PATCH: 4 CREAM TOPICAL at 20:06

## 2022-01-01 RX ADMIN — MEMANTINE HYDROCHLORIDE 5 MILLIGRAM(S): 10 TABLET ORAL at 11:24

## 2022-01-01 RX ADMIN — MIDODRINE HYDROCHLORIDE 2.5 MILLIGRAM(S): 2.5 TABLET ORAL at 06:12

## 2022-01-01 RX ADMIN — PREGABALIN 1000 MICROGRAM(S): 225 CAPSULE ORAL at 13:01

## 2022-01-01 RX ADMIN — GABAPENTIN 100 MILLIGRAM(S): 400 CAPSULE ORAL at 06:24

## 2022-01-01 RX ADMIN — GABAPENTIN 100 MILLIGRAM(S): 400 CAPSULE ORAL at 22:10

## 2022-01-01 RX ADMIN — MIDODRINE HYDROCHLORIDE 2.5 MILLIGRAM(S): 2.5 TABLET ORAL at 17:47

## 2022-01-01 RX ADMIN — Medication 650 MILLIGRAM(S): at 05:28

## 2022-01-01 RX ADMIN — HEPARIN SODIUM 5000 UNIT(S): 5000 INJECTION INTRAVENOUS; SUBCUTANEOUS at 22:24

## 2022-01-01 RX ADMIN — DIVALPROEX SODIUM 250 MILLIGRAM(S): 500 TABLET, DELAYED RELEASE ORAL at 23:27

## 2022-01-01 RX ADMIN — PANTOPRAZOLE SODIUM 40 MILLIGRAM(S): 20 TABLET, DELAYED RELEASE ORAL at 06:09

## 2022-01-01 RX ADMIN — Medication 25 MILLIGRAM(S): at 18:27

## 2022-01-01 RX ADMIN — Medication 650 MILLIGRAM(S): at 11:27

## 2022-01-01 RX ADMIN — Medication 25 MILLIGRAM(S): at 18:59

## 2022-01-01 RX ADMIN — Medication 25 MILLIGRAM(S): at 18:30

## 2022-01-01 RX ADMIN — Medication 1 DROP(S): at 23:30

## 2022-01-01 RX ADMIN — PREGABALIN 1000 MICROGRAM(S): 225 CAPSULE ORAL at 13:28

## 2022-01-01 RX ADMIN — LIDOCAINE 1 PATCH: 4 CREAM TOPICAL at 00:27

## 2022-01-01 RX ADMIN — Medication 650 MILLIGRAM(S): at 00:00

## 2022-01-01 RX ADMIN — Medication 50 MICROGRAM(S): at 06:27

## 2022-01-01 RX ADMIN — Medication 0.5 MILLIGRAM(S): at 13:30

## 2022-01-01 RX ADMIN — PANTOPRAZOLE SODIUM 40 MILLIGRAM(S): 20 TABLET, DELAYED RELEASE ORAL at 06:00

## 2022-01-01 RX ADMIN — Medication 25 MILLIGRAM(S): at 17:09

## 2022-01-01 RX ADMIN — PREGABALIN 1000 MICROGRAM(S): 225 CAPSULE ORAL at 11:07

## 2022-01-01 RX ADMIN — PANTOPRAZOLE SODIUM 40 MILLIGRAM(S): 20 TABLET, DELAYED RELEASE ORAL at 06:12

## 2022-01-01 RX ADMIN — LISINOPRIL 40 MILLIGRAM(S): 2.5 TABLET ORAL at 05:24

## 2022-01-01 RX ADMIN — Medication 500 MILLIGRAM(S): at 06:11

## 2022-01-01 RX ADMIN — Medication 25 MICROGRAM(S): at 06:35

## 2022-01-01 RX ADMIN — PREGABALIN 1000 MICROGRAM(S): 225 CAPSULE ORAL at 14:27

## 2022-01-01 RX ADMIN — MIDODRINE HYDROCHLORIDE 2.5 MILLIGRAM(S): 2.5 TABLET ORAL at 05:09

## 2022-01-01 RX ADMIN — LACTULOSE 20 GRAM(S): 10 SOLUTION ORAL at 11:07

## 2022-01-01 RX ADMIN — AMIODARONE HYDROCHLORIDE 100 MILLIGRAM(S): 400 TABLET ORAL at 06:12

## 2022-01-01 RX ADMIN — Medication 100 MILLIGRAM(S): at 21:46

## 2022-01-01 RX ADMIN — MEMANTINE HYDROCHLORIDE 5 MILLIGRAM(S): 10 TABLET ORAL at 11:34

## 2022-01-01 RX ADMIN — Medication 25 MILLIGRAM(S): at 05:24

## 2022-01-01 RX ADMIN — AMIODARONE HYDROCHLORIDE 100 MILLIGRAM(S): 400 TABLET ORAL at 05:34

## 2022-01-01 RX ADMIN — Medication 25 MICROGRAM(S): at 06:23

## 2022-01-01 RX ADMIN — Medication 81 MILLIGRAM(S): at 11:07

## 2022-01-01 RX ADMIN — Medication 1 DROP(S): at 22:54

## 2022-01-01 RX ADMIN — HEPARIN SODIUM 5000 UNIT(S): 5000 INJECTION INTRAVENOUS; SUBCUTANEOUS at 13:49

## 2022-01-01 RX ADMIN — CHLORHEXIDINE GLUCONATE 1 APPLICATION(S): 213 SOLUTION TOPICAL at 05:08

## 2022-01-01 RX ADMIN — GABAPENTIN 100 MILLIGRAM(S): 400 CAPSULE ORAL at 23:28

## 2022-01-01 RX ADMIN — PREGABALIN 1000 MICROGRAM(S): 225 CAPSULE ORAL at 11:29

## 2022-01-01 RX ADMIN — Medication 650 MILLIGRAM(S): at 14:25

## 2022-01-01 RX ADMIN — Medication 5 MILLIGRAM(S): at 21:34

## 2022-01-01 RX ADMIN — MIDODRINE HYDROCHLORIDE 10 MILLIGRAM(S): 2.5 TABLET ORAL at 23:28

## 2022-01-01 RX ADMIN — CHLORHEXIDINE GLUCONATE 1 APPLICATION(S): 213 SOLUTION TOPICAL at 05:52

## 2022-01-01 RX ADMIN — Medication 25 MILLIGRAM(S): at 06:28

## 2022-01-01 RX ADMIN — Medication 500 MILLIGRAM(S): at 17:48

## 2022-01-01 RX ADMIN — LIDOCAINE 1 PATCH: 4 CREAM TOPICAL at 00:05

## 2022-01-01 RX ADMIN — Medication 650 MILLIGRAM(S): at 00:13

## 2022-01-01 RX ADMIN — Medication 650 MILLIGRAM(S): at 06:13

## 2022-01-01 RX ADMIN — LISINOPRIL 40 MILLIGRAM(S): 2.5 TABLET ORAL at 06:21

## 2022-01-01 RX ADMIN — Medication 650 MILLIGRAM(S): at 17:41

## 2022-01-01 RX ADMIN — DIVALPROEX SODIUM 125 MILLIGRAM(S): 500 TABLET, DELAYED RELEASE ORAL at 17:52

## 2022-01-01 RX ADMIN — PANTOPRAZOLE SODIUM 40 MILLIGRAM(S): 20 TABLET, DELAYED RELEASE ORAL at 06:27

## 2022-01-01 RX ADMIN — MIDODRINE HYDROCHLORIDE 10 MILLIGRAM(S): 2.5 TABLET ORAL at 06:35

## 2022-01-01 RX ADMIN — Medication 25 MILLIGRAM(S): at 05:42

## 2022-01-01 RX ADMIN — MEMANTINE HYDROCHLORIDE 5 MILLIGRAM(S): 10 TABLET ORAL at 12:07

## 2022-01-01 RX ADMIN — Medication 650 MILLIGRAM(S): at 11:23

## 2022-01-01 RX ADMIN — Medication 650 MILLIGRAM(S): at 14:00

## 2022-01-01 RX ADMIN — HEPARIN SODIUM 5000 UNIT(S): 5000 INJECTION INTRAVENOUS; SUBCUTANEOUS at 17:30

## 2022-01-01 RX ADMIN — DIVALPROEX SODIUM 250 MILLIGRAM(S): 500 TABLET, DELAYED RELEASE ORAL at 22:11

## 2022-01-01 RX ADMIN — MIDODRINE HYDROCHLORIDE 2.5 MILLIGRAM(S): 2.5 TABLET ORAL at 17:14

## 2022-01-01 RX ADMIN — MIDODRINE HYDROCHLORIDE 2.5 MILLIGRAM(S): 2.5 TABLET ORAL at 05:32

## 2022-01-01 RX ADMIN — DIVALPROEX SODIUM 125 MILLIGRAM(S): 500 TABLET, DELAYED RELEASE ORAL at 06:15

## 2022-01-01 RX ADMIN — DIVALPROEX SODIUM 250 MILLIGRAM(S): 500 TABLET, DELAYED RELEASE ORAL at 21:07

## 2022-01-01 RX ADMIN — MEMANTINE HYDROCHLORIDE 5 MILLIGRAM(S): 10 TABLET ORAL at 11:29

## 2022-01-01 RX ADMIN — LIDOCAINE 1 PATCH: 4 CREAM TOPICAL at 13:29

## 2022-01-01 RX ADMIN — SENNA PLUS 2 TABLET(S): 8.6 TABLET ORAL at 21:53

## 2022-01-01 RX ADMIN — PREGABALIN 1000 MICROGRAM(S): 225 CAPSULE ORAL at 12:07

## 2022-01-01 RX ADMIN — HEPARIN SODIUM 5000 UNIT(S): 5000 INJECTION INTRAVENOUS; SUBCUTANEOUS at 05:25

## 2022-01-01 RX ADMIN — Medication 25 MILLIGRAM(S): at 17:15

## 2022-01-01 RX ADMIN — MIDODRINE HYDROCHLORIDE 2.5 MILLIGRAM(S): 2.5 TABLET ORAL at 17:27

## 2022-01-01 RX ADMIN — HEPARIN SODIUM 5000 UNIT(S): 5000 INJECTION INTRAVENOUS; SUBCUTANEOUS at 05:22

## 2022-01-01 RX ADMIN — Medication 650 MILLIGRAM(S): at 18:30

## 2022-01-01 RX ADMIN — PANTOPRAZOLE SODIUM 40 MILLIGRAM(S): 20 TABLET, DELAYED RELEASE ORAL at 05:24

## 2022-01-01 RX ADMIN — Medication 25 MILLIGRAM(S): at 06:12

## 2022-01-01 RX ADMIN — PANTOPRAZOLE SODIUM 40 MILLIGRAM(S): 20 TABLET, DELAYED RELEASE ORAL at 06:18

## 2022-01-01 RX ADMIN — Medication 650 MILLIGRAM(S): at 06:23

## 2022-01-01 RX ADMIN — Medication 650 MILLIGRAM(S): at 05:36

## 2022-01-01 RX ADMIN — Medication 650 MILLIGRAM(S): at 13:29

## 2022-01-01 RX ADMIN — DIVALPROEX SODIUM 125 MILLIGRAM(S): 500 TABLET, DELAYED RELEASE ORAL at 17:59

## 2022-01-01 RX ADMIN — POLYETHYLENE GLYCOL 3350 17 GRAM(S): 17 POWDER, FOR SOLUTION ORAL at 05:34

## 2022-01-01 RX ADMIN — Medication 40 MILLIGRAM(S): at 17:46

## 2022-01-01 RX ADMIN — HEPARIN SODIUM 5000 UNIT(S): 5000 INJECTION INTRAVENOUS; SUBCUTANEOUS at 17:47

## 2022-01-01 RX ADMIN — Medication 650 MILLIGRAM(S): at 00:42

## 2022-01-01 RX ADMIN — SODIUM CHLORIDE 250 MILLILITER(S): 9 INJECTION, SOLUTION INTRAVENOUS at 03:32

## 2022-01-01 RX ADMIN — AMLODIPINE BESYLATE 2.5 MILLIGRAM(S): 2.5 TABLET ORAL at 05:33

## 2022-01-01 RX ADMIN — LIDOCAINE 1 PATCH: 4 CREAM TOPICAL at 11:27

## 2022-01-01 RX ADMIN — DIVALPROEX SODIUM 125 MILLIGRAM(S): 500 TABLET, DELAYED RELEASE ORAL at 06:31

## 2022-01-01 RX ADMIN — MIDODRINE HYDROCHLORIDE 2.5 MILLIGRAM(S): 2.5 TABLET ORAL at 14:25

## 2022-01-01 RX ADMIN — LIDOCAINE 1 PATCH: 4 CREAM TOPICAL at 20:34

## 2022-01-01 RX ADMIN — Medication 650 MILLIGRAM(S): at 10:28

## 2022-01-01 RX ADMIN — TETANUS TOXOID, REDUCED DIPHTHERIA TOXOID AND ACELLULAR PERTUSSIS VACCINE, ADSORBED 0.5 MILLILITER(S): 5; 2.5; 8; 8; 2.5 SUSPENSION INTRAMUSCULAR at 16:34

## 2022-01-01 RX ADMIN — Medication 25 MILLIGRAM(S): at 05:32

## 2022-01-01 RX ADMIN — Medication 5 MILLIGRAM(S): at 23:28

## 2022-01-01 RX ADMIN — Medication 500 MILLIGRAM(S): at 05:27

## 2022-01-01 RX ADMIN — DIVALPROEX SODIUM 125 MILLIGRAM(S): 500 TABLET, DELAYED RELEASE ORAL at 05:57

## 2022-01-01 RX ADMIN — Medication 1 DROP(S): at 13:19

## 2022-01-01 RX ADMIN — Medication 81 MILLIGRAM(S): at 11:42

## 2022-01-01 RX ADMIN — Medication 650 MILLIGRAM(S): at 18:57

## 2022-01-01 RX ADMIN — LACTULOSE 20 GRAM(S): 10 SOLUTION ORAL at 12:47

## 2022-01-01 RX ADMIN — Medication 1 DROP(S): at 05:26

## 2022-01-01 RX ADMIN — DIVALPROEX SODIUM 250 MILLIGRAM(S): 500 TABLET, DELAYED RELEASE ORAL at 22:25

## 2022-01-01 RX ADMIN — Medication 650 MILLIGRAM(S): at 10:36

## 2022-01-01 RX ADMIN — Medication 100 MILLIGRAM(S): at 18:02

## 2022-01-01 RX ADMIN — LISINOPRIL 40 MILLIGRAM(S): 2.5 TABLET ORAL at 05:11

## 2022-01-01 RX ADMIN — LISINOPRIL 20 MILLIGRAM(S): 2.5 TABLET ORAL at 05:33

## 2022-01-01 RX ADMIN — Medication 650 MILLIGRAM(S): at 17:48

## 2022-01-01 RX ADMIN — HEPARIN SODIUM 5000 UNIT(S): 5000 INJECTION INTRAVENOUS; SUBCUTANEOUS at 05:35

## 2022-01-01 RX ADMIN — MEMANTINE HYDROCHLORIDE 5 MILLIGRAM(S): 10 TABLET ORAL at 13:28

## 2022-01-01 RX ADMIN — SENNA PLUS 2 TABLET(S): 8.6 TABLET ORAL at 21:34

## 2022-01-01 RX ADMIN — LISINOPRIL 40 MILLIGRAM(S): 2.5 TABLET ORAL at 06:12

## 2022-01-01 RX ADMIN — PREGABALIN 1000 MICROGRAM(S): 225 CAPSULE ORAL at 11:24

## 2022-01-01 RX ADMIN — MIDODRINE HYDROCHLORIDE 2.5 MILLIGRAM(S): 2.5 TABLET ORAL at 11:51

## 2022-01-01 RX ADMIN — SODIUM CHLORIDE 1000 MILLILITER(S): 9 INJECTION INTRAMUSCULAR; INTRAVENOUS; SUBCUTANEOUS at 18:00

## 2022-01-01 RX ADMIN — MORPHINE SULFATE 5 MILLIGRAM(S): 50 CAPSULE, EXTENDED RELEASE ORAL at 11:20

## 2022-01-01 RX ADMIN — MORPHINE SULFATE 5 MILLIGRAM(S): 50 CAPSULE, EXTENDED RELEASE ORAL at 10:51

## 2022-01-01 RX ADMIN — GABAPENTIN 100 MILLIGRAM(S): 400 CAPSULE ORAL at 06:35

## 2022-01-01 RX ADMIN — HEPARIN SODIUM 5000 UNIT(S): 5000 INJECTION INTRAVENOUS; SUBCUTANEOUS at 06:11

## 2022-01-01 RX ADMIN — Medication 650 MILLIGRAM(S): at 06:35

## 2022-01-01 RX ADMIN — Medication 650 MILLIGRAM(S): at 07:09

## 2022-01-01 RX ADMIN — Medication 5 MILLIGRAM(S): at 21:53

## 2022-01-01 RX ADMIN — GABAPENTIN 100 MILLIGRAM(S): 400 CAPSULE ORAL at 21:07

## 2022-01-01 RX ADMIN — PANTOPRAZOLE SODIUM 40 MILLIGRAM(S): 20 TABLET, DELAYED RELEASE ORAL at 06:34

## 2022-01-01 RX ADMIN — GABAPENTIN 100 MILLIGRAM(S): 400 CAPSULE ORAL at 13:49

## 2022-01-01 RX ADMIN — Medication 81 MILLIGRAM(S): at 11:06

## 2022-01-01 RX ADMIN — PANTOPRAZOLE SODIUM 40 MILLIGRAM(S): 20 TABLET, DELAYED RELEASE ORAL at 05:43

## 2022-01-01 RX ADMIN — HEPARIN SODIUM 5000 UNIT(S): 5000 INJECTION INTRAVENOUS; SUBCUTANEOUS at 15:09

## 2022-01-01 RX ADMIN — DIVALPROEX SODIUM 125 MILLIGRAM(S): 500 TABLET, DELAYED RELEASE ORAL at 17:42

## 2022-01-01 RX ADMIN — GABAPENTIN 100 MILLIGRAM(S): 400 CAPSULE ORAL at 14:27

## 2022-01-01 RX ADMIN — AMLODIPINE BESYLATE 2.5 MILLIGRAM(S): 2.5 TABLET ORAL at 06:21

## 2022-01-01 RX ADMIN — Medication 500 MILLIGRAM(S): at 06:35

## 2022-01-01 RX ADMIN — POLYETHYLENE GLYCOL 3350 17 GRAM(S): 17 POWDER, FOR SOLUTION ORAL at 05:23

## 2022-01-01 RX ADMIN — GABAPENTIN 100 MILLIGRAM(S): 400 CAPSULE ORAL at 05:38

## 2022-01-01 RX ADMIN — POLYETHYLENE GLYCOL 3350 17 GRAM(S): 17 POWDER, FOR SOLUTION ORAL at 05:43

## 2022-01-01 RX ADMIN — Medication 650 MILLIGRAM(S): at 13:16

## 2022-01-01 RX ADMIN — Medication 650 MILLIGRAM(S): at 23:14

## 2022-01-01 RX ADMIN — Medication 650 MILLIGRAM(S): at 05:35

## 2022-01-01 RX ADMIN — SODIUM CHLORIDE 500 MILLILITER(S): 9 INJECTION, SOLUTION INTRAVENOUS at 22:10

## 2022-01-01 RX ADMIN — DIVALPROEX SODIUM 250 MILLIGRAM(S): 500 TABLET, DELAYED RELEASE ORAL at 21:34

## 2022-01-01 RX ADMIN — PREGABALIN 1000 MICROGRAM(S): 225 CAPSULE ORAL at 12:38

## 2022-01-01 RX ADMIN — SODIUM CHLORIDE 1000 MILLILITER(S): 9 INJECTION, SOLUTION INTRAVENOUS at 10:28

## 2022-01-01 RX ADMIN — Medication 650 MILLIGRAM(S): at 05:59

## 2022-01-01 RX ADMIN — Medication 1 DROP(S): at 06:37

## 2022-01-01 RX ADMIN — GABAPENTIN 100 MILLIGRAM(S): 400 CAPSULE ORAL at 05:26

## 2022-01-01 RX ADMIN — PREGABALIN 1000 MICROGRAM(S): 225 CAPSULE ORAL at 13:45

## 2022-01-01 RX ADMIN — Medication 1 DROP(S): at 21:07

## 2022-01-01 RX ADMIN — Medication 25 MICROGRAM(S): at 05:26

## 2022-01-01 RX ADMIN — AMLODIPINE BESYLATE 2.5 MILLIGRAM(S): 2.5 TABLET ORAL at 05:10

## 2022-01-01 RX ADMIN — PANTOPRAZOLE SODIUM 40 MILLIGRAM(S): 20 TABLET, DELAYED RELEASE ORAL at 06:23

## 2022-01-01 RX ADMIN — AMIODARONE HYDROCHLORIDE 100 MILLIGRAM(S): 400 TABLET ORAL at 11:27

## 2022-01-01 RX ADMIN — GABAPENTIN 100 MILLIGRAM(S): 400 CAPSULE ORAL at 22:24

## 2022-01-01 RX ADMIN — Medication 1 DROP(S): at 05:59

## 2022-01-01 RX ADMIN — Medication 25 GRAM(S): at 08:45

## 2022-01-01 RX ADMIN — Medication 100 MILLIGRAM(S): at 13:03

## 2022-01-01 RX ADMIN — LIDOCAINE 1 PATCH: 4 CREAM TOPICAL at 23:00

## 2022-01-01 RX ADMIN — GABAPENTIN 100 MILLIGRAM(S): 400 CAPSULE ORAL at 14:06

## 2022-01-01 RX ADMIN — LIDOCAINE 1 PATCH: 4 CREAM TOPICAL at 11:25

## 2022-01-01 RX ADMIN — PANTOPRAZOLE SODIUM 40 MILLIGRAM(S): 20 TABLET, DELAYED RELEASE ORAL at 06:20

## 2022-01-01 RX ADMIN — SENNA PLUS 2 TABLET(S): 8.6 TABLET ORAL at 21:35

## 2022-01-13 NOTE — REVIEW OF SYSTEMS
[Fever] : no fever [Blurry Vision] : no blurred vision [Hearing Loss] : hearing loss [SOB] : no shortness of breath [Chest Discomfort] : no chest discomfort [Palpitations] : no palpitations [Cough] : no cough [Wheezing] : no wheezing [Abdominal Pain] : no abdominal pain [Diarrhea] : diarrhea [Constipation] : no constipation [Pain During Urination] : no dysuria [Joint Pain] : no joint pain [Myalgia] : no myalgia [Rash] : no rash [Skin Lesions] : skin lesion(s): [Dizziness] : no dizziness [Weakness] : no weakness [Confusion] : no confusion was observed [Easy Bleeding] : no tendency for easy bleeding [de-identified] : sees derm

## 2022-01-13 NOTE — DISCUSSION/SUMMARY
[FreeTextEntry1] : Pt aware need low salt 1500 mg Na diet. Told to inform us if gained more wt and increased SOB. H/O PAF not on anticoagulation. Pt at risk to fall .His ICD has not fired. His  EF  is 55% . Had non dx DSE 5/15.  Possible Persantine soon. Wants to wait for now. Echo 5/18 aorta 3.8cm EF 55% MILD MR. Told watch food w salt. Gets food from meals on wheels . Amiodarone decreased by Bekeit to 100mg  7 days/wk. She also decreased ACE. Still at times lightheaded. But stable.  . Edema legs resolved. FIGUEROA stable.  He lost 1 pond.  Echo 1/21 EF 50% MOD MR Not drive. Lives  alone.. He cares for self. Told try walk. He got 3 covid vaccines. Blood done by pmd june 2021. Got  iron infusions. he got 5. Repeat bloods' reportedly good. See heme Dr Oro.

## 2022-01-13 NOTE — HISTORY OF PRESENT ILLNESS
[FreeTextEntry1] : Patient with escudero.No change.  No sob. No Chest pain. . No palpitations. AICD  not fire.  No fever, rash, or recent illness.  No joint pain/swelling/stiffness.  He does stretching exercises. He does it in am and after oon. In am light headed. Then better.

## 2022-04-11 NOTE — CARDIOLOGY SUMMARY
[de-identified] : 4/11/2022 A-sensed, Bi-V paced (HR 71 bpm)\par 9/13/21, A paced, BiV paced (HR 72 bpm)  [de-identified] : 1/20/21, Mod MR LVEF 50%.

## 2022-04-11 NOTE — PROCEDURE
[Complete Heart Block] : complete heart block [See Scanned Paceart Report] : See scanned paceart report [See Device Printout] : See device printout [CRT-D] : Cardiac resynchronization therapy defibrillator [DDDR] : DDDR [Voltage: ___ volts] : Voltage was [unfilled] volts [Charge Time: ___ sec] : charge time was [unfilled] seconds [Longevity: ___ months] : The estimated remaining battery life is [unfilled] months [Threshold Testing Performed] : Threshold testing was performed [Atrial] : Atrial [Ventricular] : Ventricular [Sensing Amplitude ___mv] : sensing amplitude was [unfilled] mv [Lead Imp:  ___ohms] : lead impedance was [unfilled] ohms [___V @] : [unfilled] V [___ ms] : [unfilled] ms [Programmed for Longevity] : output reprogrammed for improved battery longevity [Asense-Vsense ___ %] : Asense-Vsense [unfilled]% [Asense-Vpace ___ %] : Asense-Vpace [unfilled]% [Apace-Vsense ___ %] : Apace-Vsense [unfilled]% [Apace-Vpace ___ %] : Apace-Vpace [unfilled]% [de-identified] : Medtronic [de-identified] : [id number] [de-identified] : [id number] [de-identified] : 7/8/2016 [de-identified] : 70 [de-identified] : No events.\par Fluid below threshold\par Total BiV P 96.1%

## 2022-04-11 NOTE — ASSESSMENT
[FreeTextEntry1] : Mr. Leslie has a history of CAD s/p CABG, sustained VT on Amio, ICM s/p CRT-D here for routine device follow up. \par \par # ICM (EF 50%)\par - CRT-D with normal function and no arrhythmias. Stable Optivol. No clinical signs/symptoms of heart failure.\par - Remote monitor is set up and patient is transmitting. \par \par # History of Sustained VT\par - On Amio. Labs from Jan reviewed. Normal LFTs. No TFTs. Ordered labs today. \par - Pulm and ophtho referrals provided again and emphasized to patient.\par \par # PAF\par - Only one episode of AF for 35 min on 6/14/2021. CHADS VASc of at least 5 (CHF, HTN, Age > 75, CAD)\par - Case was discussed with Dr. Combs's office who know patient well back in June. No AC at this time due to history of AFib on Coumadin with fall and SDH. \par - Discussed option of Watchman but patient states he forgot to discuss it with Dr. Combs. He will be seeing him soon so will discuss it at that time. \par \par # CAD \par - Cont GDMT including Benazapril and Labetalol\par \par # HTN\par - BP well controlled\par - 2g Na diet enforced\par \par I have also advised the patient to go to the nearest emergency room if he experiences any chest pain, dyspnea, syncope, or has any other compelling symptoms.\par \par Follow up in 6 months. \par \par

## 2022-04-11 NOTE — HISTORY OF PRESENT ILLNESS
[de-identified] : \par Cardiologist: Dr. 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 Of note, pt lost his wife to liver ca in 2019. AF seen on remote in June. Case was discussed with Dr. Combs's office who know patient well. No AC at this time due to history of AFib on Coumadin with fall and SDH. \par \par He denies chest pain, palpitations, dyspnea, dizziness, lightheadedness, presyncope or syncope. He states he feels well overall.

## 2022-04-11 NOTE — PHYSICAL EXAM
[General Appearance - Well Developed] : well developed [Normal Appearance] : normal appearance [Well Groomed] : well groomed [General Appearance - Well Nourished] : well nourished [No Deformities] : no deformities [General Appearance - In No Acute Distress] : no acute distress [Heart Rate And Rhythm] : heart rate and rhythm were normal [Heart Sounds] : normal S1 and S2 [Edema] : no peripheral edema present [FreeTextEntry1] : 2/6 systolic murmur [] : no respiratory distress [Respiration, Rhythm And Depth] : normal respiratory rhythm and effort [Exaggerated Use Of Accessory Muscles For Inspiration] : no accessory muscle use [Auscultation Breath Sounds / Voice Sounds] : lungs were clear to auscultation bilaterally [Left Infraclavicular] : left infraclavicular area [Clean] : clean [Dry] : dry [Well-Healed] : well-healed [Abdomen Soft] : soft [Nail Clubbing] : no clubbing of the fingernails

## 2022-04-21 NOTE — PHYSICAL EXAM
[5th Left ICS - MCL] : palpated at the 5th LICS in the midclavicular line [No Precordial Heave] : no precordial heave was noted [Normal Rate] : normal [Rhythm Regular] : regular [Normal S1] : normal S1 [Normal S2] : normal S2 [S3] : no S3 [S4] : no S4 [I] : a grade 1 [No Pitting Edema] : no pitting edema present [2+] : left 2+ [Left Carotid Bruit] : no bruit heard over the left carotid [Right Carotid Bruit] : no bruit heard over the right carotid [No Abnormalities] : the abdominal aorta was not enlarged and no bruit was heard [Normal] : alert and oriented, normal memory

## 2022-04-21 NOTE — REVIEW OF SYSTEMS
[Fever] : no fever [Blurry Vision] : no blurred vision [Hearing Loss] : hearing loss [SOB] : no shortness of breath [Chest Discomfort] : no chest discomfort [Palpitations] : no palpitations [Cough] : no cough [Wheezing] : no wheezing [Abdominal Pain] : no abdominal pain [Diarrhea] : diarrhea [Constipation] : no constipation [Pain During Urination] : no dysuria [Joint Pain] : no joint pain [Myalgia] : no myalgia [Rash] : no rash [Dizziness] : no dizziness [Skin Lesions] : skin lesion(s): [Weakness] : no weakness [Confusion] : no confusion was observed [Easy Bleeding] : no tendency for easy bleeding [de-identified] : sees derm

## 2022-04-21 NOTE — HISTORY OF PRESENT ILLNESS
[FreeTextEntry1] : Patient with escudero.No change.  No sob. No Chest pain. . No palpitations. AICD  not fire.  He does stretching exercises. He does it in am and after noon. In am light headed. Then better by 11.

## 2022-04-21 NOTE — DISCUSSION/SUMMARY
[FreeTextEntry1] : Pt aware need low salt 1500 mg Na diet. Told to inform us if gained more wt and increased SOB. H/O PAF not on anticoagulation. Pt at risk to fall .His ICD has not fired. His  EF  is 55% . Had non dx DSE 5/15.  Possible Persantine soon. Wants to wait for now. Echo 5/18 aorta 3.8cm EF 55% MILD MR. Told watch food w salt. Gets food from [employer] . Amiodarone decreased by Bekeit to 100mg  7 days/wk. She also decreased ACE. Still at times lightheaded. But stable.  . Edema legs resolved. FIGUEROA stable.  He lost 1 pond.  Echo 1/21 EF 50% MOD MR Not drive. Lives  alone.. He cares for self. Told try walk. He got 3 covid vaccines. Blood done by pmd june 2021. Got  iron infusions. he got 5. Repeat bloods' reportedly good. See heme Dr Oro. Reviewed getting watchman . Risk benefit. He is 89 not sure he wants to do procedure at his age. Told see opth. Will send for cxr. On amiodarone. Memmory getting worse

## 2022-05-08 NOTE — ED PROVIDER NOTE - ATTENDING APP SHARED VISIT CONTRIBUTION OF CARE
89 yr old male prior cardiac surgery with medtronic defib pw syncopal event at home.  felt dizzy at sink, hit head twice on cabinet, no LOC.  no numbness, tingling, weakness, cp, sob.  has been having episodes of dizziness recently.  no prior syncope.    feels well at this time, asymptomatic  unknown last tetanus    imaging, lac repair, tetanus, syncope workup with tele admission

## 2022-05-08 NOTE — H&P ADULT - ATTENDING COMMENTS
89 YO M with a PMH of HTN, CAD s/p CABG, CKD3, GERD, and hx of chronic Afib & SSS s/p Biv ICD (not on AC, fall risk) who was BIBEMS for eval of head trauma s/p pre-syncopal episode while standing at the sink this AM. + HT, - LOC, - AC. Preceded by feeling of light-headedness and blurry vision after standing to walk to sink, denies any preceding symptoms of CP, palpitations, focal weakness, or headaches. No bowel/bladder incontinence, no shaking, and no tongue biting. Denies any confusion post-event. ROS negative for fevers/chills, sore throat, ABD pain, N/V/D, LE swelling, or rashes.     Of note, the pt states that these similar symptoms have been occurring in the AM for the past x 1 month. He takes his medications religiously every morning and notes the symptoms usually start afterwards.     In the ED, CTH/C-spine showed two scalp hematomas, otherwise negative for acute process. CThe cardiac enzymes were .02 and the EKG showed no ischemic changes. LAC was repaired with staples in the ED.    FMHx: Reviewed, not relevant    Physical exam shows pt in NAD. VSS, afebrile, not hypoxic on RA. A&Ox3, follows verbal commands. Scalp w/ right temporal hematoma, and left occipital laceration w/ staples in place. Neuro exam intact, no focal weakness. CTA B/L with no W/C/R. RRR, no M/G/R. ABD is soft and non-tender, normoactive BSs. LEs without swelling. No rashes. Labs and radiology as above.     Head trauma s/p pre-syncopal episode, suspect medication adverse effect, rule out cardiac; doubt seizure. Tele admit. Serial cardiac enzymes/EKGs. Echo. TSH. UA. Check orthostatics. PT eval. Fall precautions.   -Check orthostatics after medication administration in the AM, If positive, pt's AM BP meds will need to be titrated downwards  -EP evaluated Biv ICD, no events    Troponin elevation, suspected cause is CKD3. Doubt ACS. No CP or EKG changes. Serial cardiac enzymes and EKGs.     Macrocytic anemia, at baseline. Send B12/Folate levels. Replace PRN.     Hx of HTN, CAD s/p CABG, CKD3, GERD, and hx of chronic Afib & SSS s/p Biv ICD (not on AC, fall risk). Restart home meds, except as stated above. DVT PPX. Inform PCP of pt's admission to hospital. My note supersedes the residents note.     Date seen by [email or web address] 89 YO M with a PMH of HTN, CAD s/p CABG, CKD3, GERD, and hx of chronic Afib & SSS s/p Biv ICD (not on AC, fall risk) who was BIBEMS for eval of head trauma s/p pre-syncopal episode while standing at the sink this AM. + HT, - LOC, - AC. Preceded by feeling of light-headedness and blurry vision after standing to walk to sink, denies any preceding symptoms of CP, palpitations, focal weakness, or headaches. No bowel/bladder incontinence, no shaking, and no tongue biting. Denies any confusion post-event. ROS negative for fevers/chills, sore throat, ABD pain, N/V/D, LE swelling, or rashes.     Of note, the pt states that these similar symptoms have been occurring in the AM for the past x 1 month. He takes his medications religiously every morning and notes the symptoms usually start afterwards.     In the ED, CTH/C-spine showed two scalp hematomas, otherwise negative for acute process. Imaging of RUE is pending official read. The cardiac enzymes were .02 and the EKG showed no ischemic changes. LAC was repaired with staples in the ED.    FMHx: Reviewed, not relevant    Physical exam shows pt in NAD. VSS, afebrile, not hypoxic on RA. A&Ox3, follows verbal commands. Scalp w/ right temporal hematoma, and left occipital laceration w/ staples in place. Neuro exam intact, no focal weakness. CTA B/L with no W/C/R. RRR, no M/G/R. ABD is soft and non-tender, normoactive BSs. RUE is in a splint, able to move fingers, no numbness of fingers. No rashes. Labs and radiology as above.     Head trauma s/p pre-syncopal episode, suspect medication adverse effect, rule out cardiac; doubt seizure. Tele admit. Serial cardiac enzymes/EKGs. Echo. TSH. UA. Check orthostatics. PT eval. Fall precautions.   -Check orthostatics after medication administration in the AM, If positive, pt's AM BP meds will need to be titrated downwards  -EP evaluated Biv ICD, no events    Troponin elevation, suspected cause is CKD3. Doubt ACS. No CP or EKG changes. Serial cardiac enzymes and EKGs.     Comminuted RUE olecranon fracture. Ortho is following, splint placed in the ED. Monitor distal extremities. PT. Out-pt FU    Macrocytic anemia, at baseline. Send B12/Folate levels. Replace PRN.     Hx of HTN, CAD s/p CABG, CKD3, GERD, and hx of chronic Afib & SSS s/p Biv ICD (not on AC, fall risk). Restart home meds, except as stated above. DVT PPX. Inform PCP of pt's admission to hospital. My note supersedes the residents note.     Date seen by [email or web address]

## 2022-05-08 NOTE — H&P ADULT - HISTORY OF PRESENT ILLNESS
History of Present Illness  Mr. Leslie is a 89 year old male patient known to have:  - Baseline AO3, lives alone, ambulates independently  - HTN  - CAD s/p CABG 25 years ago. Follows with Dr Combs  - History of atrial Fibrillation and SSS s/p Biv ICD Medtronic (non MR compatible). Not on AC due to high risk of falls per daughter  - CKD (baseline 1.7-1.9 2020)  - GERD  - Skin Cancer      He was brought to the ED on 05/08 after an episode of syncope/fall.  History goes back to this morning after breakfast when the patient was washing dishes when he felt light headed and fell backward to hit his head on the wooden floor.  Per patient, prior to fall, he only complained of light headedness and blurry vision with no CP, palpitations, diaphoresis, or blacking out.  During the fall, he had HT but denied LOC or seizure like activity.  After fall, he noted a laceration on back of head. He stood up and took some time before he was able to understand what happened.  He called his brother who lives next door who found a pool of blood on the floor.      On review of systems, patient denies any recent fever, chills, night sweats, URTI symptoms (cough, rhinorrhea, sore throat), urinary symptoms (urinary frequency, urgency, intermittence, dysuria, foul smelling urine, cloudy urine), change in bowel movements (diarrhea or constipation), abdominal pain, headache, nausea, or vomiting.   No sick contacts.   No recent travel or exposure to recent travelers.      Upon presentation to the ED, the patient was hemodynamically stable:  Vital Signs in ED   - /72mmHg  - HR 80 bpm  - RR 18bpm  - T 36.7  - SaO2 99%      Investigations   Laboratory Workup  - CBC:                        9.9    11.36 )-----------( 246      ( 08 May 2022 13:52 )             29.7     - Chemistry:  05-08    136  |  102  |  23<H>  ----------------------------<  113<H>  4.7   |  23  |  1.5    Ca    9.0      08 May 2022 13:52  Mg     2.3     05-08    TPro  7.9  /  Alb  3.6  /  TBili  0.8  /  DBili  x   /  AST  13  /  ALT  7   /  AlkPhos  61  05-08    - Cardiac Markers:  CARDIAC MARKERS ( 08 May 2022 18:19 )  x     / 0.02 ng/mL / x     / x     / x      CARDIAC MARKERS ( 08 May 2022 13:52 )  x     / 0.03 ng/mL / x     / x     / x          Radiological Workup  * CT Head No Cont (05.08.22 @ 14:27) CT HEAD No acute intracranial findings. Moderate chronic microvascular ischemic changes. Large right temporoparietal scalp hematoma and small left parietal scalp hematoma. No underlying calvarial fracture.  * CT CERVICAL SPINE: No acute cervical fracture or dislocation.  * Xray Pelvis AP only (05.08.22 @ 14:59) No fractures seen  * XR right elbow and forearm revealed comminuted right olecranon fracture    - Patient was evaluated by orthopedic team: s/p right posterior elbow splint and side struts  - He is s/p posterior head laceration repairs  - He was also evaluated by EP who interrogated device Biv ICD: functioning normally with no events  - He will be admitted for further syncope work up and surgical intervention for comminuted right olecranon fracture

## 2022-05-08 NOTE — ED ADULT TRIAGE NOTE - CHIEF COMPLAINT QUOTE
BIBA from home pt. states he was feeling woozy and fell and hit the back of his head against the counter but never passed out. -loc. GCS 15. pt. on Aspirin. pt. presents with a 2 cm lac to the back of the head

## 2022-05-08 NOTE — ED PROVIDER NOTE - PHYSICAL EXAMINATION
CONSTITUTIONAL: In no apparent distress.   HEAD: 4 cm linear laceration noticed in the posterior head.   EYES: Pupils are round and reactive, extra-ocular muscles are intact. Eyelids are normal in appearance without swelling or lesions.   ENT: External ears are non-tender and without swelling or erythema. Hearing is intact with good acuity to spoken voice. Patient is speaking clearly, not muffled and airway is intact.   NECK: No midline tenderness  RESPIRATORY: No signs of respiratory distress. Lung sounds are clear in all lobes bilaterally without rales, rhonchi, or wheezes.  CARDIOVASCULAR: Regular rate and rhythm.   GI: Abdomen is soft, non-tender, and without distention. Bowel sounds are present and normoactive in all four quadrants. No masses are noted.   BACK: No evidence of trauma or deformity. No midline tenderness. Normal ROM.   PELVIS: No evidence of trauma or deformity. No tenderness to palpation.  EXT: R elbow swelling noticed; nontender to palpation with full ROM; distal pulse present; sensory function intact.   SKIN: Multiple skin abrasion noticed in the B/L upper extremities.   NEURO: A & O x 3. Normal speech.   PSYCHOLOGICAL: Appropriate mood and affect. Good judgement and insight.

## 2022-05-08 NOTE — CONSULT NOTE ADULT - SUBJECTIVE AND OBJECTIVE BOX
ORTHOPEDIC SURGERY CONSULT    89y Male RHD here with right elbow pain after fall. States he felt lightheaded and felt and hit his right elbow. Also endorses hitting his head and sustained a scalp lac, which was sutured in the ED. Denies any pain elsewhere.     Denies numbness/tingling.  Denies f/c/n/v/cp/sob.    Medications:    No Known Allergies      PMH/PSH:  Heart disease  Hypertension  Skin cancer  History of open heart surgery    FHx: skin cancer    Exam:  T(C): 36.5 (05-08-22 @ 15:12), Max: 36.7 (05-08-22 @ 13:11)  HR: 84 (05-08-22 @ 15:12) (80 - 84)  BP: 192/87 (05-08-22 @ 15:12) (127/72 - 192/87)  RR: 18 (05-08-22 @ 15:12) (18 - 18)  SpO2: 100% (05-08-22 @ 15:12) (99% - 100%)  General: Awake, alert, NAD    RUE:   5 mm abrasion over olecranon, dermis intact  Swelling throughuot forearm and olecranon  Ecchymosis throughout forearm  SILT axillary, m/r/u distally  Motor intact AIN/PIN/ulnar    2+ radial/ulnar pulses    No TTP remaining extremities   Remainder of MSK exam reveals no acute deformity or other areas of pain    Labs:                        9.9    11.36 )-----------( 246      ( 08 May 2022 13:52 )             29.7     05-08    136  |  102  |  23<H>  ----------------------------<  113<H>  4.7   |  23  |  1.5    Ca    9.0      08 May 2022 13:52  Mg     2.3     05-08    TPro  7.9  /  Alb  3.6  /  TBili  0.8  /  DBili  x   /  AST  13  /  ALT  7   /  AlkPhos  61  05-08    Imaging: Comminuted R olecranon fracture    A/P:  89yMale RHD with comminuted R olecranon fracture  - placed in Posterior splint with side struts  - Risks, benefits and alternatives have been discussed and the patient/family are in agreement with surgical intervention  - NWB RUE   - pain control  - ice/elevation   - Patient to be admitted for syncope workup  - When discharged, can follow up with Dr. Ford as outpatient - [phone]; [address] [facility]

## 2022-05-08 NOTE — ED PROVIDER NOTE - CARE PLAN
Principal Discharge DX:	Syncope  Secondary Diagnosis:	Head injury, closed  Secondary Diagnosis:	Laceration of scalp   1

## 2022-05-08 NOTE — CHART NOTE - NSCHARTNOTEFT_GEN_A_CORE
Electrophysiology    Pts device _BiV ICD (Medtronic)_____ was interrogated on 05-08-22  Device working properly  Events: no events  Battery life 17 months  Results d/w with primary team  Reviewed by attending    Contact EP ACP with any questions [id number] Electrophysiology    Pts device _BiV ICD (Medtronic)_____ was interrogated on 05-08-22  Device working properly  Events: no events  Battery life 17 months  Device is NOT MRI compatible   Results d/w with primary team  Reviewed by attending    Contact EP ACP with any questions [phone]

## 2022-05-08 NOTE — ED PROCEDURE NOTE - NS ED ATTENDING STATEMENT MOD
This was a shared visit with the CARITO. I reviewed and verified the documentation and independently performed the documented:

## 2022-05-08 NOTE — ED PROVIDER NOTE - WET READ LAUNCH FT
There are 2 Wet Read(s) to document. There are 4 Wet Read(s) to document. There are no Wet Read(s) to document.

## 2022-05-08 NOTE — ED PROVIDER NOTE - NS ED ROS FT
Constitutional: Negative for fever, chills, and fatigue.  HENT: Negative for headache,  Eyes: Negative for eye pain, and vision change.  Cardiovascular: Negative for chest pain, and palpitation.  Respiratory: Negative for SOB, wheezing, cough and sputum production.  Gastrointestinal: Negative for nausea, vomiting, abdominal pain, constipation, diarrhea, hematochezia, and melena.  Genitourinary: Negative for flank pain, dysuria, frequency, and hematuria.  Neurological: + syncope and LOC.   Musculoskeletal: + R elbow pain. Negative for back pain, neck pain, and calf cramps.  Hematological: Does not bruise/bleed easily.

## 2022-05-08 NOTE — ED PROVIDER NOTE - NS ED ATTENDING STATEMENT MOD
Progress Note    Patient: Ashlyn Bedolla MRN: [id number]  SSN: [id number]    YOB: 1944  Age: 68 y.o. Sex: female      Admit Date: 8/4/2020    LOS: 6 days     Ashlyn Bedolla is a 68 y.o. female who has a PMH of HTN, dyslipidemia, metastatic breast cancer sp RT in 2016, CKD stage IV, sp left hip arthroplasty + wound vac, wound infection on chronic antibiotic therapy, who was brought in via EMS after her daughter noted her confused, not making sense. The patient is bed-bound after her hip surgery on 5/20. Upon arrival she was noted aphasic, anemic and received 1 PRBc after a Hb of 6.8 gr. A brain CT showed a large subacute CVA, at the L temporal and parietal lobe. Negative for bleeding. cva work up was completed. The patient was started on asa and plavix. Neurology followed. There were no telemetry changes, no shunts on echo. PT/OT suggested LTAC and CM on board. Subjective:   She had no complains. Objective:     Vitals:    08/04/20 1254 08/10/20 0000 08/10/20 0400 08/10/20 0800   BP:  133/65 140/65 127/56   Pulse:  70 75 73   Resp:  18 18 18   Temp:  98.4 °F (36.9 °C) 97.5 °F (36.4 °C) 97.5 °F (36.4 °C)   SpO2:  96% 95% 95%   Weight: 109.8 kg (242 lb)      Height: 5' 7\" (1.702 m)           Intake and Output:  Current Shift: No intake/output data recorded. Last three shifts: 08/08 1901 - 08/10 0700  In: -   Out: 1625 [Urine:1625]    Physical Exam:   GENERAL: alert, cooperative  EYE: negative  LYMPHATIC: Cervical, supraclavicular, and axillary nodes normal.   THROAT & NECK: normal and no erythema or exudates noted. LUNG: clear to auscultation bilaterally  HEART: regular rate and rhythm, S1, S2 normal, no murmur, click, rub or gallop  ABDOMEN: soft, non-tender. Bowel sounds normal. No masses,  no organomegaly  EXTREMITIES:  extremities normal, atraumatic, no cyanosis or edema.    Left hip with wound vac, no purulent secretion, no erythema   SKIN: Normal.  NEUROLOGIC: aphasia, able to follow some commands, more conversant today. Generalized weakness, unable to assess pronator drift or sensation.      Lab/Data Review: All lab results for the last 24 hours reviewed. Assessment:     Principal Problem:    CVA (cerebral vascular accident) ([facility] [address] (8/4/2020)    Active Problems:    Hypertension (2/16/2016)      Overview: UNSPECIFIED       Malignant neoplasm metastatic to bone ([facility] [address] (7/31/2016)      Overview: Last Assessment & Plan:       1. Complete radiation therapy to the patient's sacrum and femurs as       directed by Dr. Grayson Young. 2.  Return to the office in 6 weeks with x-rays of the pelvis on arrival.      3.  Instruct the patient to advance her weightbearing as tolerated as she       continues to enjoy a reduction in pain following the radiation therapy. Anemia due to chronic kidney disease treated with erythropoietin (7/25/2017)      Severe obesity ([facility] (9/24/2019)      Stage 4 chronic kidney disease ([facility] [facility] (5/1/2020)      Left hip postoperative wound infection (5/13/2020)      Vomiting (8/4/2020)      Anemia (8/4/2020)      CKD (chronic kidney disease) (8/4/2020)      DNR (do not resuscitate) (8/4/2020)        Plan:   -Acute Left MCA infarct (parietal):  CTA: Occlusion of an anterior M2 branch of the left middle cerebral artery. ECHO: normal ef, no shunts, no vegetations or masses   Continue mechanical soft diet  Appreciate speech evaluation   Continue asa, high intensity statin  HTN control  DVT ppx  Keep under telemetry  Neurology following   PT/OT. Suggested LTAC.    -Anemia:  Possible due to CKD, wound vac  Sp 1 prbc. Hb is now 8.3 gr   Monitor HH serially     -Vomiting: resolved      -CKD stage Iv:  Avoid nephrotoxic meds  IOs  Daily labs     -HTN:  home meds      -Left hip wound infection, sp hardware removal:  Continue wound vac  Wound care consulted  On chronic keflex     -Thrombocytopenia:  Noted.  Monitor  Hold heparin derivatives     -Hx of metastatic breast cancer:   Prior radiotherapy in 2016. On letrozole     -Obesity: counseling     DVT ppx: compression stockings.   Holding heparin in view of anemia and thrombocytopenia      Code status: DNR.     Risk: high    Estimated DC planning: STR versus LTAC    Signed By: Reza Pham MD     August 10, 2020 Attending Only This was a shared visit with the CARITO. I reviewed and verified the documentation and independently performed the documented:

## 2022-05-08 NOTE — H&P ADULT - ASSESSMENT
Assessment and Plan  Case of a 89 year old male patient with HTN, afib/SSS s/p Biv ICD medtronic, CKD, CAD s/p CABG, and GERD who presented to the ED on 05/08 following an episode of a fall complicated by posterior head lacerations, found to have a large right Tempero-Parietal and small left parietal hematomas and found to have right comminuted fracture of the elbow, admitted for further investigations, monitoring, and preoperative clearance. Currently hemodynamically stable.      Syncope  Large Right Frontoparietal and Small Left Parietal Hematoma  Acute Right Comminuted Fracture  * ED vitals stable  * CT Head No Cont (05.08.22 @ 14:27) CT HEAD No acute intracranial findings. Moderate chronic microvascular ischemic changes. Large right temporoparietal scalp hematoma and small left parietal scalp hematoma. No underlying calvarial fracture.  * CT CERVICAL SPINE: No acute cervical fracture or dislocation.  * Xray Pelvis AP only (05.08.22 @ 14:59) No fractures seen  * XR right elbow and forearm revealed comminuted right olecranon fracture    - Orthopedic team on board: s/p right posterior elbow splint with side struts. Will schedule surgical intervention as patient and family agreeable  - S/P head lacerations sutures  - EP team on board: s/p interrogation of Biv ICD medtronic on 05/08: functioning normally with no events  - Cardiology contacted non officially: admit to tele for tele monitoring  - Check orthostatics  - Check TTE  - Check rEEG and consider neurology evaluation if cardiology workup negative  - Check TSH, folate, and B12  - PT/OT for safe discharge plan      Elevated Troponin  Likely NSTEMI II  History of CAD s/p CABG  * Home med Aspirin 81mg QD; not on statin  * Follows with Dr Combs  * Troponin 0.03/0.02 05/08  * ECG with no ischemic changes  - Trend CE: repeat at 20:00 PM and in AM  - Will resume Aspirin but monitor hematomas  - Not on statin (patient and daughter not sure why)  - Check lipid profile and Hba1c   - Check TTE  - Tele monitoring      History of Atrial Fibrillation and SSS s/p Biv ICD (Medtronic)  Prolonged QTc  * Not on AC due to high risk of falls   * Home med Amiodarone 100mg QD  * ECG 05/08   - EP team on board: s/p interrogation of Biv ICD medtronic on 05/08: functioning normally with no events  - Tele monitoring  - Repeat ECG in AM since QTc 527 05/08  - Avoid QTc prolonging agents  - Monitor HR  - Keep off AC  - Check TSH      Macrocytic Anemia  * Baseline Hb 10.2 06/21/2021  * ED Hb 9.9, .7  * No prior studies    - Trend CBC  - Active T/S  - Check iron studies, folate, and B12 for now  - Resume home B12 supplements 100mcg QD      HTN  * ED /42 mmHg  * Home AMlor 2.5mg QD, Benazepril 40mg QD, and Labetalol 100mg BID  - Monitor BP   - Resume AMlor 2.5mg QD, Benazepril 40mg QD, and Labetalol 100mg BID  - Monitor Cr with ACEI      CKD  * Baseline 1.7-1.9 2020  * ED BUN 23, Cr 1.5  - Trend CMP and P      GERD  * Home med Omeprazole 20mg QD  - Start Protonix 40mg QD      Skin Cancer  - Outpatient follow up as indicated      Others  - DVT Prophylaxis: Check [facility] duplex. SCDs if negative in setting of hematoma and head trauma  - GI Prophylaxis: Pantoprazole 40mg PO QD  - Diet: DASH/ TLC (NPO after midnight pending ortho follow up in AM)  - Code Status: Full       Barriers to learning: NO  Discharge Planning: Patient will be discharged once stable   Plan was communicated with patient and medical team       David Atkins MD  PGY - 2 Internal Medicine   [facility]   Assessment and Plan  Case of a 89 year old male patient with HTN, afib/SSS s/p Biv ICD medtronic, CKD, CAD s/p CABG, and GERD who presented to the ED on 05/08 following an episode of a fall complicated by posterior head lacerations, found to have a large right Tempero-Parietal and small left parietal hematomas and found to have right comminuted fracture of the elbow, admitted for further investigations, monitoring, and preoperative clearance. Currently hemodynamically stable.      Syncope  Large Right Frontoparietal and Small Left Parietal Hematoma  Acute Right Comminuted Fracture  * ED vitals stable  * CT Head No Cont (05.08.22 @ 14:27) CT HEAD No acute intracranial findings. Moderate chronic microvascular ischemic changes. Large right temporoparietal scalp hematoma and small left parietal scalp hematoma. No underlying calvarial fracture.  * CT CERVICAL SPINE: No acute cervical fracture or dislocation.  * Xray Pelvis AP only (05.08.22 @ 14:59) No fractures seen  * XR right elbow and forearm revealed comminuted right olecranon fracture    - Orthopedic team on board: s/p right posterior elbow splint with side struts. Will schedule surgical intervention as patient and family agreeable. Pain control with tylenol PRN. NWB to RUE. Ice/elevation of RUE for now  - S/P head lacerations sutures  - EP team on board: s/p interrogation of Biv ICD medtronic on 05/08: functioning normally with no events  - Cardiology contacted non officially: admit to tele for tele monitoring  - Check orthostatics  - Check TTE  - Check rEEG and consider neurology evaluation if cardiology workup negative  - Check TSH, folate, and B12  - PT/OT for safe discharge plan      Elevated Troponin  Likely NSTEMI II  History of CAD s/p CABG  * Home med Aspirin 81mg QD; not on statin  * Follows with Dr Combs  * Troponin 0.03/0.02 05/08  * ECG with no ischemic changes  - Trend CE: repeat at 20:00 PM and in AM  - Will resume Aspirin but monitor hematomas  - Not on statin (patient and daughter not sure why)  - Check lipid profile and Hba1c   - Check TTE  - Tele monitoring      History of Atrial Fibrillation and SSS s/p Biv ICD (Medtronic)  Prolonged QTc  * Not on AC due to high risk of falls   * Home med Amiodarone 100mg QD  * ECG 05/08   - EP team on board: s/p interrogation of Biv ICD medtronic on 05/08: functioning normally with no events  - Tele monitoring  - Repeat ECG in AM since QTc 527 05/08  - Avoid QTc prolonging agents  - Monitor HR  - Keep off AC  - Check TSH      Macrocytic Anemia  * Baseline Hb 10.2 06/21/2021  * ED Hb 9.9, .7  * No prior studies    - Trend CBC  - Active T/S  - Check iron studies, folate, and B12 for now  - Resume home B12 supplements 100mcg QD      HTN  * ED /42 mmHg  * Home AMlor 2.5mg QD, Benazepril 40mg QD, and Labetalol 100mg BID  - Monitor BP   - Resume AMlor 2.5mg QD, Benazepril 40mg QD, and Labetalol 100mg BID  - Monitor Cr with ACEI      CKD  * Baseline 1.7-1.9 2020  * ED BUN 23, Cr 1.5  - Trend CMP and P      GERD  * Home med Omeprazole 20mg QD  - Start Protonix 40mg QD      Skin Cancer  - Outpatient follow up as indicated      Others  - DVT Prophylaxis: Check [facility] duplex. SCDs if negative in setting of hematoma and head trauma  - GI Prophylaxis: Pantoprazole 40mg PO QD  - Diet: DASH/ TLC (NPO after midnight pending ortho follow up in AM)  - Code Status: Full       Barriers to learning: NO  Discharge Planning: Patient will be discharged once stable   Plan was communicated with patient and medical team       David Atkins MD  PGY - 2 Internal Medicine   [facility]   Assessment and Plan  Case of a 89 year old male patient with HTN, afib/SSS s/p Biv ICD medtronic, CKD, CAD s/p CABG, and GERD who presented to the ED on 05/08 following an episode of a fall complicated by posterior head lacerations, found to have a large right Tempero-Parietal and small left parietal hematomas and found to have right comminuted fracture of the elbow, admitted for further investigations, monitoring, and preoperative clearance. Currently hemodynamically stable.      Syncope  Large Right Frontoparietal and Small Left Parietal Hematoma  Acute Right Olecranon Comminuted Fracture  * ED vitals stable  * CT Head No Cont (05.08.22 @ 14:27) CT HEAD No acute intracranial findings. Moderate chronic microvascular ischemic changes. Large right temporoparietal scalp hematoma and small left parietal scalp hematoma. No underlying calvarial fracture.  * CT CERVICAL SPINE: No acute cervical fracture or dislocation.  * Xray Pelvis AP only (05.08.22 @ 14:59) No fractures seen  * XR right elbow and forearm revealed comminuted right olecranon fracture    - Orthopedic team on board: s/p right posterior elbow splint with side struts. Will schedule surgical intervention as patient and family agreeable. Pain control with tylenol PRN. NWB to RUE. Ice/elevation of RUE for now  - S/P head lacerations sutures  - EP team on board: s/p interrogation of Biv ICD medtronic on 05/08: functioning normally with no events  - Cardiology contacted non officially: admit to tele for tele monitoring  - Check orthostatics  - Check TTE  - Check rEEG and consider neurology evaluation if cardiology workup negative  - Check TSH, folate, and B12  - PT/OT for safe discharge plan      Elevated Troponin  Likely NSTEMI II  History of CAD s/p CABG  * Home med Aspirin 81mg QD; not on statin  * Follows with Dr Combs  * Troponin 0.03/0.02 05/08  * ECG with no ischemic changes  - Trend CE: repeat at 20:00 PM and in AM  - Will resume Aspirin but monitor hematomas  - Not on statin (patient and daughter not sure why)  - Check lipid profile and Hba1c   - Check TTE  - Tele monitoring      History of Atrial Fibrillation and SSS s/p Biv ICD (Medtronic)  Prolonged QTc  * Not on AC due to high risk of falls   * Home med Amiodarone 100mg QD  * ECG 05/08   - EP team on board: s/p interrogation of Biv ICD medtronic on 05/08: functioning normally with no events  - Tele monitoring  - Repeat ECG in AM since QTc 527 05/08  - Avoid QTc prolonging agents  - Monitor HR  - Keep off AC  - Check TSH  - Keep K>4 and Mg>2      Macrocytic Anemia  * Baseline Hb 10.2 06/21/2021  * ED Hb 9.9, .7  * No prior studies    - Trend CBC  - Active T/S  - Check iron studies, folate, and B12 for now  - Resume home B12 supplements 100mcg QD      HTN  * ED /42 mmHg  * Home AMlor 2.5mg QD, Benazepril 40mg QD, and Labetalol 100mg BID  - Monitor BP   - Resume AMlor 2.5mg QD, Benazepril 40mg QD, and Labetalol 100mg BID  - Monitor Cr with ACEI      CKD  * Baseline 1.7-1.9 2020  * ED BUN 23, Cr 1.5  - Trend CMP and P      GERD  * Home med Omeprazole 20mg QD  - Start Protonix 40mg QD      Skin Cancer  - Outpatient follow up as indicated      Others  - DVT Prophylaxis: Check [facility] duplex. SCDs if negative in setting of hematoma and head trauma  - GI Prophylaxis: Pantoprazole 40mg PO QD  - Diet: DASH/ TLC (NPO after midnight pending ortho follow up in AM)  - Code Status: Full       Barriers to learning: NO  Discharge Planning: Patient will be discharged once stable   Plan was communicated with patient and medical team       David Atkins MD  PGY - 2 Internal Medicine   [facility]

## 2022-05-08 NOTE — ED PROVIDER NOTE - PROGRESS NOTE DETAILS
Spoke with cardiology who requested to admit patient to tele and call EP for AICD interrogation. EP was consulted and will come down to see the patient. Elbow xr with olecranon fx, ortho called and splinted. admit to [facility]

## 2022-05-08 NOTE — ED PROVIDER NOTE - OBJECTIVE STATEMENT
89 year old male, past medical history htn, cad on asa, who presents s/p syncope. 89 y/o male, past medical history of HTN, cardia dysrhythmia (pacemaker) who presents after a syncope episode PTA. Pt was washing dishes and felt lightheaded and fell backward. Unknown pt had LOC or not. Pt called brother who lives next door and went to check the patient and saw pt walking around in the room and cleaning the blood. Denies fever, SOB, chest pain, N/V, abdominal pain, urinary symptom, ,melena, and hematochezia. Also endorses R elbow pain.

## 2022-05-08 NOTE — H&P ADULT - NSHPPHYSICALEXAM_GEN_ALL_CORE
- Physical Exam in ED  * General Appearance: Alert, cooperative, interactive, oriented to time, place, and person, in no acute distress  * Head: right TP and L parietal scalp hematomas s/p repairs noted  * Back: Symmetric, no curvature, ROM normal, no CVA tenderness  * Lungs: Respirations unlabored, Good bilateral air entry, normal breath sounds (Clear to auscultation bilaterally, no audible wheezes, crackles, or rhonchi)  * Chest Wall: No tenderness or deformity, CABG scar noted  * Heart: no audible murmur, rub, or gallop  * Abdomen: Symmetric, non-distended, no scar, soft, non-tender, bowel sounds active all four quadrants, no masses, no organomegaly (no hepatosplenomegaly)  * Extremities: Extremities normal, atraumatic, no cyanosis, no lower extremity pitting edema bilaterally, adequate dorsalis pedis pulses  * Pulses: 2+ and symmetric all extremities  * Skin: Skin color, texture, turgor normal, no rashes or lesions  * Lymph nodes: Cervical, supraclavicular, and axillary nodes normal  * Neurologic: CNII-XII intact, normal strength, sensation and reflexes throughout

## 2022-05-08 NOTE — ED ADULT NURSE NOTE - NSIMPLEMENTINTERV_GEN_ALL_ED
Implemented All Fall with Harm Risk Interventions:  [place] to call system. Call bell, personal items and telephone within reach. Instruct patient to call for assistance. Room bathroom lighting operational. Non-slip footwear when patient is off stretcher. Physically safe environment: no spills, clutter or unnecessary equipment. Stretcher in lowest position, wheels locked, appropriate side rails in place. Provide visual cue, wrist band, yellow gown, etc. Monitor gait and stability. Monitor for mental status changes and reorient to person, place, and time. Review medications for side effects contributing to fall risk. Reinforce activity limits and safety measures with patient and family. Provide visual clues: red socks.

## 2022-05-09 NOTE — CONSULT NOTE ADULT - SUBJECTIVE AND OBJECTIVE BOX
HISTORY OF PRESENT ILLNESS:   89 yo M hx of CAD CABG 25 years ago, Afib no AC due to fall risk, SSS s/p Biv AICD, CKD, HTN presented from home after fall. Patient reports doing dishes as usual when he felt sudden onset of lightheadedness. Patient then fell and hit back of head with ? LOC. Patient woke up and called daughter when he was noted with confusion. Patient otherwise denied shaking, incontinence, chest pain, palpitation, etc.    PAST MEDICAL & SURGICAL HISTORY  Heart disease    Hypertension    Skin cancer    History of open heart surgery    FHx: skin cancer        FAMILY HISTORY:  FAMILY HISTORY:      SOCIAL HISTORY:  []smoker  []Alcohol  []Drug    ROS:  Negative except as mentioned in HPI    ALLERGIES:  No Known Allergies      MEDICATIONS:  MEDICATIONS  (STANDING):  aMIOdarone   Oral Tab/Cap - Peds 100 milliGRAM(s) Oral daily  amLODIPine   Tablet 2.5 milliGRAM(s) Oral daily  aspirin  chewable 81 milliGRAM(s) Oral daily  chlorhexidine 4% Liquid 1 Application(s) Topical <User Schedule>  cyanocobalamin 1000 MICROGram(s) Oral daily  labetalol 100 milliGRAM(s) Oral two times a day  lisinopril 40 milliGRAM(s) Oral daily  pantoprazole    Tablet 40 milliGRAM(s) Oral before breakfast    MEDICATIONS  (PRN):  acetaminophen     Tablet .. 650 milliGRAM(s) Oral every 6 hours PRN Mild Pain (1 - 3)      HOME MEDICATIONS:  Home Medications:  amiodarone 100 mg oral tablet: 1 tab(s) orally once a day (04 Aug 2021 07:22)  amLODIPine 2.5 mg oral tablet: 1 tab(s) orally once a day (04 Aug 2021 07:22)  Aspirin Low Dose 81 mg oral tablet, chewable: 1 tab(s) orally once a day (04 Aug 2021 07:22)  benazepril 40 mg oral tablet: 1 tab(s) orally once a day (04 Aug 2021 07:22)  labetalol 100 mg oral tablet: 1 tab(s) orally 2 times a day (04 Aug 2021 07:22)  omeprazole 20 mg oral delayed release capsule: 1 cap(s) orally once a day (04 Aug 2021 07:22)  Vitamin B12 50 mcg oral tablet: 2 tab(s) orally once a day (08 May 2022 21:54)      VITALS:   T(F): 98.3 (05-09 @ 04:41), Max: 98.3 (05-09 @ 04:41)  HR: 99 (05-09 @ 04:41) (66 - 99)  BP: 109/56 (05-09 @ 04:41) (109/56 - 192/87)  BP(mean): --  RR: 18 (05-09 @ 04:41) (18 - 18)  SpO2: 99% (05-09 @ 04:41) (99% - 100%)    I&O's Summary      PHYSICAL EXAM:  GEN: Not in acute distress  HEENT: NCAT, PERRL, EOMI  LUNGS: Clear to auscultation bilaterally   CARDIOVASCULAR: RRR, S1/S2 present  ABD: Soft, non-tender, non-distended  EXT: left arm in sling  SKIN: Intact  NEURO: AAOx3    LABS:                        8.7    6.50  )-----------( 208      ( 09 May 2022 07:10 )             26.3     05-09    138  |  103  |  22<H>  ----------------------------<  103<H>  4.8   |  25  |  1.4    Ca    9.0      09 May 2022 07:10  Phos  3.8     05-09  Mg     2.3     05-09    TPro  7.0  /  Alb  3.1<L>  /  TBili  1.2  /  DBili  x   /  AST  16  /  ALT  6   /  AlkPhos  48  05-09    PT/INR - ( 09 May 2022 07:10 )   PT: 15.10 sec;   INR: 1.32 ratio         PTT - ( 09 May 2022 07:10 )  PTT:37.2 sec  Troponin T, Serum: 0.03 ng/mL *HH* (05-09-22 @ 07:10)  Troponin T, Serum: 0.02 ng/mL *H* (05-08-22 @ 18:19)  Troponin T, Serum: 0.03 ng/mL *HH* (05-08-22 @ 13:52)    Troponin 0.03, CKMB --, CK --/ 05-09-22 @ 07:10  Troponin 0.02, CKMB --, CK --/ 05-08-22 @ 18:19  Troponin 0.03, CKMB --, CK --/ 05-08-22 @ 13:52      05-09 Chol 87 LDL -- HDL 49 Trig 37  Hemoglobin A1C   Thyroid   HISTORY OF PRESENT ILLNESS:     89 yo M hx of CAD CABG 25 years ago, Afib no AC due to fall risk, SSS s/p Biv AICD, CKD, HTN presented from home after fall. Patient reports doing dishes as usual when he felt sudden onset of lightheadedness. Patient then fell and hit back of head with ? LOC. Patient woke up and called daughter when he was noted with confusion. Patient otherwise denied shaking, incontinence, chest pain, palpitation, etc.    PAST MEDICAL & SURGICAL HISTORY  Heart disease    Hypertension    Skin cancer    History of open heart surgery    FHx: skin cancer        FAMILY HISTORY:  FAMILY HISTORY:      SOCIAL HISTORY:  []smoker  []Alcohol  []Drug    ROS:  Negative except as mentioned in HPI    ALLERGIES:  No Known Allergies      MEDICATIONS:  MEDICATIONS  (STANDING):  aMIOdarone   Oral Tab/Cap - Peds 100 milliGRAM(s) Oral daily  amLODIPine   Tablet 2.5 milliGRAM(s) Oral daily  aspirin  chewable 81 milliGRAM(s) Oral daily  chlorhexidine 4% Liquid 1 Application(s) Topical <User Schedule>  cyanocobalamin 1000 MICROGram(s) Oral daily  labetalol 100 milliGRAM(s) Oral two times a day  lisinopril 40 milliGRAM(s) Oral daily  pantoprazole    Tablet 40 milliGRAM(s) Oral before breakfast    MEDICATIONS  (PRN):  acetaminophen     Tablet .. 650 milliGRAM(s) Oral every 6 hours PRN Mild Pain (1 - 3)      HOME MEDICATIONS:  Home Medications:  amiodarone 100 mg oral tablet: 1 tab(s) orally once a day (04 Aug 2021 07:22)  amLODIPine 2.5 mg oral tablet: 1 tab(s) orally once a day (04 Aug 2021 07:22)  Aspirin Low Dose 81 mg oral tablet, chewable: 1 tab(s) orally once a day (04 Aug 2021 07:22)  benazepril 40 mg oral tablet: 1 tab(s) orally once a day (04 Aug 2021 07:22)  labetalol 100 mg oral tablet: 1 tab(s) orally 2 times a day (04 Aug 2021 07:22)  omeprazole 20 mg oral delayed release capsule: 1 cap(s) orally once a day (04 Aug 2021 07:22)  Vitamin B12 50 mcg oral tablet: 2 tab(s) orally once a day (08 May 2022 21:54)      VITALS:   T(F): 98.3 (05-09 @ 04:41), Max: 98.3 (05-09 @ 04:41)  HR: 99 (05-09 @ 04:41) (66 - 99)  BP: 109/56 (05-09 @ 04:41) (109/56 - 192/87)  BP(mean): --  RR: 18 (05-09 @ 04:41) (18 - 18)  SpO2: 99% (05-09 @ 04:41) (99% - 100%)    I&O's Summary      PHYSICAL EXAM:  GEN: Not in acute distress  HEENT: NCAT, PERRL, EOMI  LUNGS: Clear to auscultation bilaterally   CARDIOVASCULAR: RRR, S1/S2 present  ABD: Soft, non-tender, non-distended  EXT: left arm in sling  SKIN: Intact  NEURO: AAOx3    LABS:                        8.7    6.50  )-----------( 208      ( 09 May 2022 07:10 )             26.3     05-09    138  |  103  |  22<H>  ----------------------------<  103<H>  4.8   |  25  |  1.4    Ca    9.0      09 May 2022 07:10  Phos  3.8     05-09  Mg     2.3     05-09    TPro  7.0  /  Alb  3.1<L>  /  TBili  1.2  /  DBili  x   /  AST  16  /  ALT  6   /  AlkPhos  48  05-09    PT/INR - ( 09 May 2022 07:10 )   PT: 15.10 sec;   INR: 1.32 ratio         PTT - ( 09 May 2022 07:10 )  PTT:37.2 sec  Troponin T, Serum: 0.03 ng/mL *HH* (05-09-22 @ 07:10)  Troponin T, Serum: 0.02 ng/mL *H* (05-08-22 @ 18:19)  Troponin T, Serum: 0.03 ng/mL *HH* (05-08-22 @ 13:52)    Troponin 0.03, CKMB --, CK --/ 05-09-22 @ 07:10  Troponin 0.02, CKMB --, CK --/ 05-08-22 @ 18:19  Troponin 0.03, CKMB --, CK --/ 05-08-22 @ 13:52      05-09 Chol 87 LDL -- HDL 49 Trig 37  Hemoglobin A1C   Thyroid

## 2022-05-09 NOTE — PATIENT PROFILE ADULT - FALL HARM RISK - HARM RISK INTERVENTIONS
Assistance with ambulation/Assistance OOB with selected safe patient handling equipment/Communicate Risk of Fall with Harm to all staff/Monitor gait and stability/Reinforce activity limits and safety measures with patient and family/Sit up slowly, dangle for a short time, stand at bedside before walking/Tailored Fall Risk Interventions/Visual Cue: Yellow wristband and red socks/Bed in lowest position, wheels locked, appropriate side rails in place/Call bell, personal items and telephone in reach/Instruct patient to call for assistance before getting out of bed or chair/Non-slip footwear when patient is out of bed/[place] to call system/Physically safe environment - no spills, clutter or unnecessary equipment/Purposeful Proactive Rounding/Room/bathroom lighting operational, light cord in reach

## 2022-05-09 NOTE — PHYSICAL THERAPY INITIAL EVALUATION ADULT - GAIT DISTANCE, PT EVAL
6 feet ; distance limited due to hypotension, with no symptoms. BP (gprvny=547/70, edmiia=133/57,   standing=115/84)

## 2022-05-09 NOTE — PROGRESS NOTE ADULT - SUBJECTIVE AND OBJECTIVE BOX
IDRIS BACON 89y Male  MRN#: [id number]   Hospital Day: 1d    SUBJECTIVE  Patient is a 89y old Male who presents with a chief complaint of Syncope (09 May 2022 11:23)  Currently admitted to medicine with the primary diagnosis of Syncope      INTERVAL HPI AND OVERNIGHT EVENTS:  Patient was examined and seen at bedside. This morning he is resting comfortably in bed and reports no issues or overnight events.    REVIEW OF SYMPTOMS:  CONSTITUTIONAL: No weakness, fevers or chills; No headaches  EYES: No visual changes, eye pain, or discharge  ENT: No vertigo; No ear pain or change in hearing; No sore throat or difficulty swallowing  NECK: No pain or stiffness  RESPIRATORY: No cough, wheezing, or hemoptysis; No shortness of breath  CARDIOVASCULAR: No chest pain or palpitations  GASTROINTESTINAL: No abdominal or epigastric pain; No nausea, vomiting, or hematemesis; No diarrhea or constipation; No melena or hematochezia  GENITOURINARY: No dysuria, frequency or hematuria  MUSCULOSKELETAL: No joint pain, no muscle pain, no weakness  NEUROLOGICAL: No numbness or weakness  SKIN: No itching or rashes    OBJECTIVE  PAST MEDICAL & SURGICAL HISTORY  Heart disease    Hypertension    Skin cancer    History of open heart surgery    FHx: skin cancer      ALLERGIES:  No Known Allergies    MEDICATIONS:  STANDING MEDICATIONS  aMIOdarone   Oral Tab/Cap - Peds 100 milliGRAM(s) Oral daily  amLODIPine   Tablet 2.5 milliGRAM(s) Oral daily  aspirin  chewable 81 milliGRAM(s) Oral daily  chlorhexidine 4% Liquid 1 Application(s) Topical <User Schedule>  cyanocobalamin 1000 MICROGram(s) Oral daily  labetalol 100 milliGRAM(s) Oral two times a day  lisinopril 40 milliGRAM(s) Oral daily  pantoprazole    Tablet 40 milliGRAM(s) Oral before breakfast    PRN MEDICATIONS  acetaminophen     Tablet .. 650 milliGRAM(s) Oral every 6 hours PRN      VITAL SIGNS: Last 24 Hours  T(C): 36.8 (09 May 2022 04:41), Max: 36.8 (09 May 2022 04:41)  T(F): 98.3 (09 May 2022 04:41), Max: 98.3 (09 May 2022 04:41)  HR: 99 (09 May 2022 04:41) (66 - 99)  BP: 109/56 (09 May 2022 04:41) (109/56 - 192/87)  BP(mean): --  RR: 18 (09 May 2022 04:41) (18 - 18)  SpO2: 99% (09 May 2022 04:41) (99% - 100%)    LABS:                        8.7    6.50  )-----------( 208      ( 09 May 2022 07:10 )             26.3     05-    138  |  103  |  22<H>  ----------------------------<  103<H>  4.8   |  25  |  1.4    Ca    9.0      09 May 2022 07:10  Phos  3.8     05-  Mg     2.3     -    TPro  7.0  /  Alb  3.1<L>  /  TBili  1.2  /  DBili  x   /  AST  16  /  ALT  6   /  AlkPhos  48  05-    PT/INR - ( 09 May 2022 07:10 )   PT: 15.10 sec;   INR: 1.32 ratio         PTT - ( 09 May 2022 07:10 )  PTT:37.2 sec  Urinalysis Basic - ( 09 May 2022 00:45 )    Color: Light Yellow / Appearance: Clear / S.014 / pH: x  Gluc: x / Ketone: Negative  / Bili: Negative / Urobili: <2 mg/dL   Blood: x / Protein: Trace / Nitrite: Negative   Leuk Esterase: Negative / RBC: x / WBC x   Sq Epi: x / Non Sq Epi: x / Bacteria: x        Troponin T, Serum: 0.03 ng/mL *HH* (22 @ 07:10)  Troponin T, Serum: 0.02 ng/mL *H* (22 @ 18:19)  Troponin T, Serum: 0.03 ng/mL *HH* (22 @ 13:52)      CARDIAC MARKERS ( 09 May 2022 07:10 )  x     / 0.03 ng/mL / x     / x     / x      CARDIAC MARKERS ( 08 May 2022 18:19 )  x     / 0.02 ng/mL / x     / x     / x      CARDIAC MARKERS ( 08 May 2022 13:52 )  x     / 0.03 ng/mL / x     / x     / x          RADIOLOGY:      PHYSICAL EXAM:  CONSTITUTIONAL: No acute distress, well-developed, well-groomed, AAOx3  HEAD: Atraumatic, normocephalic  EYES: EOM intact, PERRLA, conjunctiva and sclera clear  ENT: Supple, no masses, no thyromegaly, no bruits, no JVD; moist mucous membranes  PULMONARY: Clear to auscultation bilaterally; no wheezes, rales, or rhonchi  CARDIOVASCULAR: Regular rate and rhythm; no murmurs, rubs, or gallops  GASTROINTESTINAL: Soft, non-tender, non-distended; bowel sounds present  MUSCULOSKELETAL: 2+ peripheral pulses; no clubbing, no cyanosis, no edema  NEUROLOGY: non-focal  SKIN: No rashes or lesions; warm and dry           IDRIS BACON 89y Male  MRN#: [id number]   Hospital Day: 1d    SUBJECTIVE  Patient is a 89y old Male who presents with a chief complaint of Syncope (09 May 2022 11:23)  Currently admitted to medicine with the primary diagnosis of Syncope      INTERVAL HPI AND OVERNIGHT EVENTS:  Patient was examined and seen at bedside. This morning he is resting comfortably in bed and reports no issues or overnight events.    Attending note: Pt seen and examined at bedside. No cp or sob.     Orthostatic VS    22 @ 13:34  Lying BP: 155/70 HR: --   Sitting BP: 137/84 HR: --  Standing BP: 115/57 HR: --  Site: --   Mode: --    22 @ 00:20  Lying BP: 159/72 HR: 92   Sitting BP: 139/60 HR: 88  Standing BP: 93/56 HR: 94  Site: upper left arm   Mode: electronic      REVIEW OF SYMPTOMS:  CONSTITUTIONAL: No weakness, fevers or chills; No headaches  EYES: No visual changes, eye pain, or discharge  ENT: No vertigo; No ear pain or change in hearing; No sore throat or difficulty swallowing  NECK: No pain or stiffness  RESPIRATORY: No cough, wheezing, or hemoptysis; No shortness of breath  CARDIOVASCULAR: No chest pain or palpitations  GASTROINTESTINAL: No abdominal or epigastric pain; No nausea, vomiting, or hematemesis; No diarrhea or constipation; No melena or hematochezia  GENITOURINARY: No dysuria, frequency or hematuria  MUSCULOSKELETAL: No joint pain, no muscle pain, no weakness  NEUROLOGICAL: No numbness or weakness  SKIN: No itching or rashes    OBJECTIVE  PAST MEDICAL & SURGICAL HISTORY  Heart disease    Hypertension    Skin cancer    History of open heart surgery    FHx: skin cancer      ALLERGIES:  No Known Allergies    MEDICATIONS:  STANDING MEDICATIONS  aMIOdarone   Oral Tab/Cap - Peds 100 milliGRAM(s) Oral daily  amLODIPine   Tablet 2.5 milliGRAM(s) Oral daily  aspirin  chewable 81 milliGRAM(s) Oral daily  chlorhexidine 4% Liquid 1 Application(s) Topical <User Schedule>  cyanocobalamin 1000 MICROGram(s) Oral daily  labetalol 100 milliGRAM(s) Oral two times a day  lisinopril 40 milliGRAM(s) Oral daily  pantoprazole    Tablet 40 milliGRAM(s) Oral before breakfast    PRN MEDICATIONS  acetaminophen     Tablet .. 650 milliGRAM(s) Oral every 6 hours PRN      VITAL SIGNS: Last 24 Hours  T(C): 36.8 (09 May 2022 04:41), Max: 36.8 (09 May 2022 04:41)  T(F): 98.3 (09 May 2022 04:41), Max: 98.3 (09 May 2022 04:41)  HR: 99 (09 May 2022 04:41) (66 - 99)  BP: 109/56 (09 May 2022 04:41) (109/56 - 192/87)  BP(mean): --  RR: 18 (09 May 2022 04:41) (18 - 18)  SpO2: 99% (09 May 2022 04:41) (99% - 100%)    LABS:                        8.7    6.50  )-----------( 208      ( 09 May 2022 07:10 )             26.3     05-    138  |  103  |  22<H>  ----------------------------<  103<H>  4.8   |  25  |  1.4    Ca    9.0      09 May 2022 07:10  Phos  3.8     05-  Mg     2.3     -    TPro  7.0  /  Alb  3.1<L>  /  TBili  1.2  /  DBili  x   /  AST  16  /  ALT  6   /  AlkPhos  48  05-09    PT/INR - ( 09 May 2022 07:10 )   PT: 15.10 sec;   INR: 1.32 ratio         PTT - ( 09 May 2022 07:10 )  PTT:37.2 sec  Urinalysis Basic - ( 09 May 2022 00:45 )    Color: Light Yellow / Appearance: Clear / S.014 / pH: x  Gluc: x / Ketone: Negative  / Bili: Negative / Urobili: <2 mg/dL   Blood: x / Protein: Trace / Nitrite: Negative   Leuk Esterase: Negative / RBC: x / WBC x   Sq Epi: x / Non Sq Epi: x / Bacteria: x        Troponin T, Serum: 0.03 ng/mL *HH* (22 @ 07:10)  Troponin T, Serum: 0.02 ng/mL *H* (22 @ 18:19)  Troponin T, Serum: 0.03 ng/mL *HH* (22 @ 13:52)      CARDIAC MARKERS ( 09 May 2022 07:10 )  x     / 0.03 ng/mL / x     / x     / x      CARDIAC MARKERS ( 08 May 2022 18:19 )  x     / 0.02 ng/mL / x     / x     / x      CARDIAC MARKERS ( 08 May 2022 13:52 )  x     / 0.03 ng/mL / x     / x     / x          RADIOLOGY:      PHYSICAL EXAM:  CONSTITUTIONAL: No acute distress, well-developed, well-groomed, AAOx3  HEAD: Atraumatic, normocephalic  EYES: EOM intact, PERRLA, conjunctiva and sclera clear  ENT: Supple, no masses, no thyromegaly, no bruits, no JVD; moist mucous membranes  PULMONARY: Clear to auscultation bilaterally; no wheezes, rales, or rhonchi  CARDIOVASCULAR: Regular rate and rhythm; no murmurs, rubs, or gallops  GASTROINTESTINAL: Soft, non-tender, non-distended; bowel sounds present  MUSCULOSKELETAL: 2+ peripheral pulses; no clubbing, no cyanosis, no edema  NEUROLOGY: non-focal  SKIN: No rashes or lesions; warm and dry

## 2022-05-09 NOTE — PHYSICAL THERAPY INITIAL EVALUATION ADULT - PERTINENT HX OF CURRENT PROBLEM, REHAB EVAL
89/ male patient was washing dishes when he felt light headed and fell backward to hit his head on the wooden floor.

## 2022-05-09 NOTE — CONSULT NOTE ADULT - ASSESSMENT
CXR: clear  EKG: A sensed, V paced  TTE: 35-40%, global dysfunction, dilated LA  Tele: AV paced  AICD interrogation: no events    Syncope  CAD  Afib no AC  HFrEF    - No signs of ACS or HF exacerbation based on clinical presentation.  - No arrythmia on tele or AICD interrogation.  - Check orthostatic VS.  - Etiology of syncope likely vasovagal vs. neurological.  - No further invasive cardiac testing planned at this time.  - C/w home regimen including ASA, amlodipine, labetalol, amiodarone and ACE. Currently HD stable.  - Patient is at moderate risk for a moderate procedure if ortho surgery is offered for olecranon fracture. Patient unable to meet 4 mets functional status; however, will not pursue further cardiac w/u at this time as additional testing will not change overall management at this time.  - Will discuss plan with attending cardiologist. CXR: clear  EKG: A sensed, V paced  TTE: 35-40%, global dysfunction, dilated LA  Tele: AV paced  AICD interrogation: no events    Syncope  CAD  Afib no AC  HFrEF      - No signs of ACS or HF exacerbation based on clinical presentation.  - No arrythmia on tele or AICD interrogation.  - Check orthostatic VS.  - Etiology of syncope likely vasovagal   - No further invasive cardiac testing planned at this time.  - C/w home regimen including ASA, amlodipine, labetalol, amiodarone and ACE. Currently HD stable.  - Patient is at moderate risk for a moderate procedure if ortho surgery is offered for olecranon fracture. Patient unable to meet 4 mets functional status; however, will not pursue further cardiac w/u at this time as additional testing will not change overall management at this time.

## 2022-05-09 NOTE — PROGRESS NOTE ADULT - ASSESSMENT
Case of a 89 year old male patient with HTN, afib/SSS s/p Biv ICD medtronic, CKD, CAD s/p CABG, and GERD who presented to the ED on 05/08 following an episode of a fall complicated by posterior head lacerations, found to have a large right Tempero-Parietal and small left parietal hematomas and found to have right comminuted fracture of the elbow, admitted for further investigations, monitoring, and preoperative clearance. Currently hemodynamically stable.      #Syncope most likely 2/2 orthostatic HoTN  #Large Right Frontoparietal and Small Left Parietal Hematoma-stable  #Acute Right Olecranon Comminuted Fracture-outpt f/u for procedure  - S/P head lacerations sutures  - EP team on board: s/p interrogation of Biv ICD medtronic on 05/08: functioning normally with no events  - Cardiology contacted non officially: admit to tele for tele monitoring  - + orthostatics  - f/u TTE  - Check rEEG and consider neurology evaluation if cardiology workup negative  - f/uTSH, folate, and B12  - PT/OT for safe discharge plan      #Elevated Troponin  #Likely NSTEMI II  #History of CAD s/p CABG  - Follows with Dr Combs  - Troponin 0.03/0.02 05/08  - ECG with no ischemic changes  - f/u trops   - Will resume Aspirin but monitor hematomas  - Not on statin (patient and daughter not sure why)  - Check lipid profile and Hba1c   - Check TTE  - Tele monitoring      #History of Atrial Fibrillation and SSS s/p Biv ICD (Medtronic)  # h/o Prolonged QTc  - Not on AC due to high risk of falls   - c/w Amiodarone 100mg QD  -  ECG 05/08   - EP team on board: s/p interrogation of Biv ICD medtronic on 05/08: functioning normally with no events  - Tele monitoring  - Repeat ECG in AM since QTc 527 05/08  - Avoid QTc prolonging agents  - Monitor HR  - Keep off AC  - Check TSH  - Keep K>4 and Mg>2      #Macrocytic Anemia  - Baseline Hb 10.2 06/21/2021  - ED Hb 9.9, .7  - No prior studies done  - Trend CBC  - Active T/S  - Check iron studies, folate, and B12 for now  - Resume home B12 supplements 100mcg QD      #HTN  - BP ok  - c/w home meds       #CKD  - Baseline 1.7-1.9 2020  - ED BUN 23, Cr 1.5  - Trend CMP and Phos  - avoid nephrotoxic agents           Others  - DVT Prophylaxis: Check [facility] duplex. SCDs if negative in setting of hematoma and head trauma  - GI Prophylaxis: Pantoprazole 40mg PO QD  - Diet: DASH/ TLC (NPO after midnight pending ortho follow up in AM)  - Code Status: Full              Case of a 89 year old male patient with HTN, afib/SSS s/p Biv ICD medtronic, CKD, CAD s/p CABG, and GERD who presented to the ED on 05/08 following an episode of a fall complicated by posterior head lacerations, found to have a large right Tempero-Parietal and small left parietal hematomas and found to have right comminuted fracture of the elbow, admitted for further investigations, monitoring, and preoperative clearance. Currently hemodynamically stable.      #Syncope most likely 2/2 orthostatic HoTN  #Large Right Frontoparietal and Small Left Parietal Hematoma-stable  #Acute Right Olecranon Comminuted Fracture-outpt f/u for procedure  - S/P head lacerations sutures  - EP team on board: s/p interrogation of Biv ICD medtronic on 05/08: functioning normally with no events  - Cardiology contacted non officially: admit to tele for tele monitoring  - + orthostatics, add alonzo stalkings   - f/u TTE  - Check rEEG and consider neurology evaluation if cardiology workup negative  - f/uTSH, folate, and B12  - PT/OT for safe discharge plan      #Elevated Troponin  #Likely NSTEMI II  #History of CAD s/p CABG  - Follows with Dr Combs  - Troponin 0.03/0.02 05/08  - ECG with no ischemic changes  - f/u trops   - Will resume Aspirin but monitor hematomas  - Not on statin (patient and daughter not sure why)  - Check lipid profile and Hba1c   - Check TTE  - Tele monitoring  - cardiology followup      #History of Atrial Fibrillation and SSS s/p Biv ICD (Medtronic)  # h/o Prolonged QTc  - Not on AC due to high risk of falls   - c/w Amiodarone 100mg QD  -  ECG 05/08   - EP team on board: s/p interrogation of Biv ICD medtronic on 05/08: functioning normally with no events  - Tele monitoring  - Repeat ECG in AM since QTc 527 05/08, likel2/2 biv   - Avoid QTc prolonging agents  - Monitor HR  - Keep off AC  - Check TSH  - Keep K>4 and Mg>2      #Macrocytic Anemia  - Baseline Hb 10.2 06/21/2021  - ED Hb 9.9, .7  - No prior studies done  - Trend CBC  - Active T/S  - Check iron studies, folate, and B12 for now  - Resume home B12 supplements 100mcg QD      #HTN  - BP ok  - c/w home meds       #CKD  - Baseline 1.7-1.9 2020  - ED BUN 23, Cr 1.5  - Trend CMP and Phos  - avoid nephrotoxic agents           Others  - DVT Prophylaxis: Check [facility] duplex. SCDs if negative in setting of hematoma and head trauma  - GI Prophylaxis: Pantoprazole 40mg PO QD  - Diet: DASH/ TLC (NPO after midnight pending ortho follow up in AM)  - Code Status: Full

## 2022-05-09 NOTE — PATIENT PROFILE ADULT - DATE OF LAST VACCINATION
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 He is s/p GT placement 9/12. Gaining wait adequately at 10+ grams per day since last visit. Jamaal's genetic deletion may explain FTT and feeding issues. Given continued tolerance and weight gain, can shorten feeding length. \par \par -- Continue Miralax  1/4 cap PRN\par -- Continue current feeding regimen \par -- RTC in 8 weeks\par -- Increase feeding rate by 50 cc/hr every 3-7 days as tolerated to max rate of infusion pump (600 cc/hr). Will at that point decide if can tolerate feeds to gravity\par Call if problems. All questions answered. educated the patient and family about the diagnosis.  15-Apr-2021

## 2022-05-09 NOTE — PHYSICAL THERAPY INITIAL EVALUATION ADULT - GENERAL OBSERVATIONS, REHAB EVAL
110-140 pm Chart reviewed. Pt. seen semirecline in bed , in No apparent distress , + right UE splint,  assisted to use sling for left UE, IV lock, telemetry , alert/oriented X 3, Pt. agreed to activity/therapy.

## 2022-05-09 NOTE — PHYSICAL THERAPY INITIAL EVALUATION ADULT - MANUAL MUSCLE TESTING RESULTS, REHAB EVAL
After Visit Summary   10/19/2018    Feliciano Arreguin    MRN: [id number]           Patient Information     Date Of Birth          1993        Visit Information        Provider Department      10/19/2018 2:30 PM NL RN [facility]        Today's Diagnoses     Screening examination for pulmonary tuberculosis    -  1       Follow-ups after your visit        Your next 10 appointments already scheduled     Jan 29, 2019  8:45 AM CST   Return Visit with Eda De Dios MD, MG ENDO NURSE   [facility]    [address] [place] [id number] [phone]              Who to contact     If you have questions or need follow up information about today's clinic visit or your schedule please contact [facility] directly at [phone].  Normal or non-critical lab and imaging results will be communicated to you by [employer], letter or phone within 4 business days after the clinic has received the results. If you do not hear from us within 7 days, please contact the clinic through [employer] or phone. If you have a critical or abnormal lab result, we will notify you by phone as soon as possible.  Submit refill requests through [employer] or call your pharmacy and they will forward the refill request to us. Please allow 3 business days for your refill to be completed.          Additional Information About Your Visit        MyChart Information     [employer] gives you secure access to your electronic health record. If you see a primary care provider, you can also send messages to your care team and make appointments. If you have questions, please call your primary care clinic.  If you do not have a primary care provider, please call [phone] and they will assist you.        Care EveryWhere ID     This is your Care EveryWhere ID. This could be used by other organizations to access your [facility] medical records  [id number]          Blood Pressure from Last 3 Encounters:   08/23/18 122/76   07/24/18 (!) 164/107   05/18/18 118/70    Weight from Last 3 Encounters:   07/24/18 145 lb 15.1 oz (66.2 kg)   05/18/18 138 lb 3.2 oz (62.7 kg)   12/08/17 142 lb 1.6 oz (64.5 kg)              We Performed the Following     [facility] REPORT READ ON SITE        Primary Care Provider Office Phone # Fax #    Stacey Sandoval PA-C [phone] [address] [place] [address]        Equal Access to Services     [facility]: Hadii aad reyna hadasho Soomaali, waaxda luqadaha, qaybta kaalmada adeegyada, juanito pierre . So [facility] [phone].    ATENCIÓN: Si habla español, tiene a theodore disposición servicios gratuitos de asistencia lingüística. [facility] [phone].    We comply with applicable federal civil rights laws and [place] laws. We do not discriminate on the basis of race, color, national origin, age, disability, sex, sexual orientation, or gender identity.            Thank you!     Thank you for choosing [facility]  for your care. Our goal is always to provide you with excellent care. Hearing back from our patients is one way we can continue to improve our services. Please take a few minutes to complete the written survey that you may receive in the mail after your visit with us. Thank you!             Your Updated Medication List - Protect others around you: Learn how to safely use, store and throw away your medicines at www.disposemymeds.org.          This list is accurate as of 10/19/18  2:45 PM.  Always use your most recent med list.                   Brand Name Dispense Instructions for use Diagnosis    blood glucose monitoring meter device kit    no brand specified    1 kit    1 kit 3 times daily.    Type 1 diabetes, HbA1c goal < 7% (H)       blood glucose monitoring test strip    RACHEL CONTOUR NEXT    100 strip    Use to test blood sugar 4 times daily or as directed.    Type 1  diabetes mellitus, uncontrolled (H)       Blood Pressure Kit     1 kit    1 kit daily    Secondary hypertension, unspecified       insulin lispro 100 UNIT/ML injection    humaLOG    8 vial    Uses up to 75 units per day in insulin pump for basal, bolus, and priming of insulin pump tubing.    Type 1 diabetes mellitus not at goal (H)       insulin pen needle 31G X 8 MM    B-D U/F    200 each    Use 6 daily or as directed.    Type 1 diabetes, HbA1c goal < 7% (H)       lisinopril 5 MG tablet    PRINIVIL/ZESTRIL    90 tablet    Take 1 tablet (5 mg) by mouth daily    Type 1 diabetes mellitus not at goal (H), Benign essential hypertension       medroxyPROGESTERone 150 MG/ML injection    DEPO-PROVERA    3 mL    Inject 1 mL (150 mg) into the muscle every 3 months    Encounter for surveillance of injectable contraceptive          BLE grossly graded 4/5

## 2022-05-09 NOTE — PHYSICAL THERAPY INITIAL EVALUATION ADULT - SPECIFY REASON(S)
Pt approached at bedside ; PT set up started ( sling, straight axis cane,  plan of care discussed), however, pt verbalized he wants to eat breakfast and be seen at a later time if possible.

## 2022-05-10 NOTE — PROGRESS NOTE ADULT - SUBJECTIVE AND OBJECTIVE BOX
CHIEF COMPLAINT:  Patient is a 89y old  Male who presents with a chief complaint of Syncope (09 May 2022 12:15)      INTERVAL HISTORY/OVERNIGHT EVENTS:  feels well this am  here for syncope work up  orthostatics positive; has alonzo stocking, started on midodrine  repeat orthostatics today    pending OR tomorrow for R olecranon fracture  needs medical clearance    pt has not had bm in one week; no abd pain  declining inpatient laxatives for now  requesting laxatives on d/c    ======================  MEDICATIONS:  aMIOdarone   Oral Tab/Cap - Peds 100 milliGRAM(s) Oral daily  amLODIPine   Tablet 2.5 milliGRAM(s) Oral daily  aspirin  chewable 81 milliGRAM(s) Oral daily  chlorhexidine 4% Liquid 1 Application(s) Topical <User Schedule>  cyanocobalamin 1000 MICROGram(s) Oral daily  labetalol 100 milliGRAM(s) Oral two times a day  lisinopril 40 milliGRAM(s) Oral daily  midodrine. 2.5 milliGRAM(s) Oral three times a day  pantoprazole    Tablet 40 milliGRAM(s) Oral before breakfast    DRIPS:    PRN:       ======================  PHYSICAL EXAMINATION:  GEN:  nad.   HEENT:  eomi. ncat. laceration repair.  PULM:  b/l bs.  clear.  no wheezing. no crackles or rales.   CARD: regular. s1, s2.  no murmurs. sternotomy scar post cabg  ABD: +bs. ntnd  EXT:  no new rashes.  no pitting edema b/l .   NEURO:  no new focal deficits. aox3  ======================  OBJECTIVE:        VS:  T(F): 97.5 (05-10 @ 04:31), Max: 97.5 (05-10 @ 04:31)  HR: 81 (05-10 @ 04:31) (70 - 88)  BP: 155/69 (05-10 @ 04:31) (114/59 - 155/69)  RR: 18 (05-10 @ 04:31) (18 - 18)  SpO2: 99% (05-09 @ 20:37) (99% - 99%)  CVP(mm Hg): --  CO: --  CI: --  PA: --  PCWP: --    I/O:      05-10 @ 07:01  -  05-10 @ 10:35  --------------------------------------------------------  IN: 330 mL / OUT: 0 mL / NET: 330 mL        Weight trend:      ======================    LABS:                          8.6    8.54  )-----------( 219      ( 10 May 2022 06:00 )             26.1     05-10    141  |  105  |  21<H>  ----------------------------<  89  4.0   |  22  |  1.3    Ca    8.8      10 May 2022 06:00  Phos  3.8     05-  Mg     2.1     10    TPro  7.0  /  Alb  3.4<L>  /  TBili  0.7  /  DBili  x   /  AST  21  /  ALT  8   /  AlkPhos  50  05-10    LIVER FUNCTIONS - ( 10 May 2022 06:00 )  Alb: 3.4 g/dL / Pro: 7.0 g/dL / ALK PHOS: 50 U/L / ALT: 8 U/L / AST: 21 U/L / GGT: x           PT/INR - ( 09 May 2022 07:10 )   PT: 15.10 sec;   INR: 1.32 ratio         PTT - ( 09 May 2022 07:10 )  PTT:37.2 sec    CARDIAC MARKERS ( 09 May 2022 07:10 )  x     / 0.03 ng/mL / x     / x     / x      CARDIAC MARKERS ( 08 May 2022 18:19 )  x     / 0.02 ng/mL / x     / x     / x      CARDIAC MARKERS ( 08 May 2022 13:52 )  x     / 0.03 ng/mL / x     / x     / x            Urinalysis Basic - ( 09 May 2022 00:45 )    Color: Light Yellow / Appearance: Clear / S.014 / pH: x  Gluc: x / Ketone: Negative  / Bili: Negative / Urobili: <2 mg/dL   Blood: x / Protein: Trace / Nitrite: Negative   Leuk Esterase: Negative / RBC: x / WBC x   Sq Epi: x / Non Sq Epi: x / Bacteria: x        Cultures:         CHIEF COMPLAINT:  Patient is a 89y old  Male who presents with a chief complaint of Syncope (09 May 2022 12:15)      INTERVAL HISTORY/OVERNIGHT EVENTS:  feels well this am  here for syncope work up  orthostatics positive; has alonzo stocking, started on midodrine  repeat orthostatics today    pending OR tomorrow for R olecranon fracture  needs medical clearance    pt has not had bm in one week; no abd pain  declining inpatient laxatives for now  requesting laxatives on d/c    Attending note: Pt seen and examined at bedside. No cp or sob    ======================  MEDICATIONS:  aMIOdarone   Oral Tab/Cap - Peds 100 milliGRAM(s) Oral daily  amLODIPine   Tablet 2.5 milliGRAM(s) Oral daily  aspirin  chewable 81 milliGRAM(s) Oral daily  chlorhexidine 4% Liquid 1 Application(s) Topical <User Schedule>  cyanocobalamin 1000 MICROGram(s) Oral daily  labetalol 100 milliGRAM(s) Oral two times a day  lisinopril 40 milliGRAM(s) Oral daily  midodrine. 2.5 milliGRAM(s) Oral three times a day  pantoprazole    Tablet 40 milliGRAM(s) Oral before breakfast    DRIPS:    PRN:       ======================  PHYSICAL EXAMINATION:  GEN:  nad.   HEENT:  eomi. ncat. laceration repair.  PULM:  b/l bs.  clear.  no wheezing. no crackles or rales.   CARD: regular. s1, s2.  no murmurs. sternotomy scar post cabg  ABD: +bs. ntnd  EXT:  no new rashes.  no pitting edema b/l .   NEURO:  no new focal deficits. aox3  ======================  OBJECTIVE:        VS:  T(F): 97.5 (05-10 @ 04:31), Max: 97.5 (0510 @ 04:31)  HR: 81 (05-10 @ 04:31) (70 - 88)  BP: 155/69 (05-10 @ 04:31) (114/59 - 155/69)  RR: 18 (05-10 @ 04:31) (18 - 18)  SpO2: 99% ( @ 20:37) (99% - 99%)  CVP(mm Hg): --  CO: --  CI: --  PA: --  PCWP: --    I/O:      05-10 @ 07:01  -  05-10 @ 10:35  --------------------------------------------------------  IN: 330 mL / OUT: 0 mL / NET: 330 mL        Weight trend:      ======================    LABS:                          8.6    8.54  )-----------( 219      ( 10 May 2022 06:00 )             26.1     05-10    141  |  105  |  21<H>  ----------------------------<  89  4.0   |  22  |  1.3    Ca    8.8      10 May 2022 06:00  Phos  3.8     05-09  Mg     2.1     -10    TPro  7.0  /  Alb  3.4<L>  /  TBili  0.7  /  DBili  x   /  AST  21  /  ALT  8   /  AlkPhos  50  05-10    LIVER FUNCTIONS - ( 10 May 2022 06:00 )  Alb: 3.4 g/dL / Pro: 7.0 g/dL / ALK PHOS: 50 U/L / ALT: 8 U/L / AST: 21 U/L / GGT: x           PT/INR - ( 09 May 2022 07:10 )   PT: 15.10 sec;   INR: 1.32 ratio         PTT - ( 09 May 2022 07:10 )  PTT:37.2 sec    CARDIAC MARKERS ( 09 May 2022 07:10 )  x     / 0.03 ng/mL / x     / x     / x      CARDIAC MARKERS ( 08 May 2022 18:19 )  x     / 0.02 ng/mL / x     / x     / x      CARDIAC MARKERS ( 08 May 2022 13:52 )  x     / 0.03 ng/mL / x     / x     / x            Urinalysis Basic - ( 09 May 2022 00:45 )    Color: Light Yellow / Appearance: Clear / S.014 / pH: x  Gluc: x / Ketone: Negative  / Bili: Negative / Urobili: <2 mg/dL   Blood: x / Protein: Trace / Nitrite: Negative   Leuk Esterase: Negative / RBC: x / WBC x   Sq Epi: x / Non Sq Epi: x / Bacteria: x        Cultures:

## 2022-05-10 NOTE — PRE PROCEDURE NOTE - PRE PROCEDURE EVALUATION
ORTHOPEDIC SURGERY PRE OP NOTE    Diagnosis: Right olecranon fracture    Planned Procedure: Right olecranon open reduction internal fixation     Consent: TO BE OBTAINED BY ATTENDING                   Risks/benefits/alternatives were discussed with the patient/family and they wish to proceed with surgery.       ANTICIPATED DATE OF PROCEDURE : 5/11/22  SCHEDULED CASE OR ADD-ON CASE:  SCHEDULED     Consent: to be obtained pre-op    Clearances:   [ ] Medicine: pending orthostatic workup      T(C): 36.4 (05-10-22 @ 04:31), Max: 36.4 (05-10-22 @ 04:31)  HR: 81 (05-10-22 @ 04:31) (70 - 88)  BP: 155/69 (05-10-22 @ 04:31) (114/59 - 155/69)  RR: 18 (05-10-22 @ 04:31) (18 - 18)  SpO2: 99% (05-09-22 @ 20:37) (99% - 99%)    Labs:                        8.6    8.54  )-----------( 219      ( 10 May 2022 06:00 )             26.1     05-10    141  |  105  |  21<H>  ----------------------------<  89  4.0   |  22  |  1.3    Ca    8.8      10 May 2022 06:00  Phos  3.8     05-09  Mg     2.1     05-10    TPro  7.0  /  Alb  3.4<L>  /  TBili  0.7  /  DBili  x   /  AST  21  /  ALT  8   /  AlkPhos  50  05-10    PT/INR - ( 09 May 2022 07:10 )   PT: 15.10 sec;   INR: 1.32 ratio         PTT - ( 09 May 2022 07:10 )  PTT:37.2 sec  Type and Screen X 2:    COVID-19 PCR: NotDetec (08 May 2022 15:35)    [ ]Pregnancy test ( if female of childbearing age) : N/A  [ ]EKG: performed 5/8/22  [ ]CXR: performed 5/8/22      DIET: NPO after midnight  IVF: per primary team      ANTICOAGULATION STATUS ( include name of anticoagulant) : aspirin  [X] CONTINUE ASPIRIN  [ ] DISCONTINUE                               A/P: Patient is a 89y y/o Male Pending RIGHT OLECRANON ORIF tomorrow    [ ] -NPO and IVF @ midnight  [ ]pain control/analgesia prn per primary team   [ ]Incentive Spirometry   [ ]F/U Clearance  [ ]F/U Pending Labs  [ ]Notify Ortho with any questions- spectra [id number]    [X]DISCUSSED WITH PRIMARY TEAM MEMBER (name of team member): SOPHIE CLAIRE  [X]Date and Time DISCUSSED WITH PRIMARY TEAM MEMBER: 5/11/22 9AM ORTHOPEDIC SURGERY PRE OP NOTE    Diagnosis: Right olecranon fracture    Planned Procedure: Right olecranon open reduction internal fixation     Consent: TO BE OBTAINED BY ATTENDING                   Risks/benefits/alternatives were discussed with the patient/family and they wish to proceed with surgery.       ANTICIPATED DATE OF PROCEDURE : 5/11/22  SCHEDULED CASE OR ADD-ON CASE:  SCHEDULED     Consent: to be obtained pre-op    Clearances:   [ ] Medicine: pending orthostatic workup    Allergies  No Known Allergies    T(C): 36.4 (05-10-22 @ 04:31), Max: 36.4 (05-10-22 @ 04:31)  HR: 81 (05-10-22 @ 04:31) (70 - 88)  BP: 155/69 (05-10-22 @ 04:31) (114/59 - 155/69)  RR: 18 (05-10-22 @ 04:31) (18 - 18)  SpO2: 99% (05-09-22 @ 20:37) (99% - 99%)    Labs:                        8.6    8.54  )-----------( 219      ( 10 May 2022 06:00 )             26.1     05-10    141  |  105  |  21<H>  ----------------------------<  89  4.0   |  22  |  1.3    Ca    8.8      10 May 2022 06:00  Phos  3.8     05-09  Mg     2.1     05-10    TPro  7.0  /  Alb  3.4<L>  /  TBili  0.7  /  DBili  x   /  AST  21  /  ALT  8   /  AlkPhos  50  05-10    PT/INR - ( 09 May 2022 07:10 )   PT: 15.10 sec;   INR: 1.32 ratio         PTT - ( 09 May 2022 07:10 )  PTT:37.2 sec  Type and Screen X 2:    COVID-19 PCR: NotDetec (08 May 2022 15:35)    [ ]Pregnancy test ( if female of childbearing age) : N/A  [ ]EKG: performed 5/8/22  [ ]CXR: performed 5/8/22      DIET: NPO after midnight  IVF: per primary team      ANTICOAGULATION STATUS ( include name of anticoagulant) : aspirin  [X] CONTINUE ASPIRIN  [ ] DISCONTINUE                               A/P: Patient is a 89y y/o Male Pending RIGHT OLECRANON ORIF tomorrow    [ ] -NPO and IVF @ midnight  [ ]pain control/analgesia prn per primary team   [ ]Incentive Spirometry   [ ]F/U Clearance  [ ]F/U Pending Labs  [ ]Notify Ortho with any questions- spectra [id number]    [X]DISCUSSED WITH PRIMARY TEAM MEMBER (name of team member): SOPHIE CLAIRE  [X]Date and Time DISCUSSED WITH PRIMARY TEAM MEMBER: 5/11/22 9AM ORTHOPEDIC SURGERY PRE OP NOTE    Diagnosis: Right olecranon fracture    Planned Procedure: Right olecranon open reduction internal fixation     Consent: TO BE OBTAINED BY ATTENDING                   Risks/benefits/alternatives were discussed with the patient/family and they wish to proceed with surgery.       ANTICIPATED DATE OF PROCEDURE : 5/11/22  SCHEDULED CASE OR ADD-ON CASE:  SCHEDULED     Consent: to be obtained pre-op    Clearances:   [ ] Medicine: pending orthostatic workup    Allergies  No Known Allergies    T(C): 36.4 (05-10-22 @ 04:31), Max: 36.4 (05-10-22 @ 04:31)  HR: 81 (05-10-22 @ 04:31) (70 - 88)  BP: 155/69 (05-10-22 @ 04:31) (114/59 - 155/69)  RR: 18 (05-10-22 @ 04:31) (18 - 18)  SpO2: 99% (05-09-22 @ 20:37) (99% - 99%)    Labs:                        8.6    8.54  )-----------( 219      ( 10 May 2022 06:00 )             26.1     05-10    141  |  105  |  21<H>  ----------------------------<  89  4.0   |  22  |  1.3    Ca    8.8      10 May 2022 06:00  Phos  3.8     05-09  Mg     2.1     05-10    TPro  7.0  /  Alb  3.4<L>  /  TBili  0.7  /  DBili  x   /  AST  21  /  ALT  8   /  AlkPhos  50  05-10    PT/INR - ( 09 May 2022 07:10 )   PT: 15.10 sec;   INR: 1.32 ratio         PTT - ( 09 May 2022 07:10 )  PTT:37.2 sec  Type and Screen X 2: confirmation pending today     COVID-19 PCR: NotDetec (08 May 2022 15:35)    [ ]Pregnancy test ( if female of childbearing age) : N/A  [ ]EKG: performed 5/8/22  [ ]CXR: performed 5/8/22      DIET: NPO after midnight  IVF: per primary team      ANTICOAGULATION STATUS ( include name of anticoagulant) : aspirin  [X] CONTINUE ASPIRIN  [ ] DISCONTINUE                               A/P: Patient is a 89y y/o Male Pending RIGHT OLECRANON ORIF tomorrow    [ ] -NPO and IVF @ midnight  [ ]pain control/analgesia prn per primary team   [ ]Incentive Spirometry   [ ]F/U Clearance  [ ]F/U Pending Labs  [ ]Notify Ortho with any questions- spectra [id number]    [X]DISCUSSED WITH PRIMARY TEAM MEMBER (name of team member): SOPHIE CLAIRE  [X]Date and Time DISCUSSED WITH PRIMARY TEAM MEMBER: 5/11/22 9AM

## 2022-05-10 NOTE — PROGRESS NOTE ADULT - ASSESSMENT
89 year old male with CAD s/p CABG, afib/SSS s/p Biv ICD medtronic,, HTN, CKD, and GERD who presented to the ED on 05/08 following an episode of a fall complicated by posterior head lacerations, found to have a large right Tempero-Parietal and small left parietal hematomas and found to have right comminuted fracture of the elbow, admitted for syncope w/u. Orthostatics positive. Midodrine, alonzo stockings ordered pending repeat orthostatics. Going for OR tomorrow with orthopedics.    #Syncope most likely 2/2 orthostatic HoTN  #Large Right Frontoparietal and Small Left Parietal Hematoma-stable  - EP: s/p interrogation of Biv ICD medtronic on 05/08: functioning normally with no events  - Cardiology: recommend TTE no further work up (ACS ruled out)  - orthostatics positive, add alonzo stalkings add midodrine 2.5 q8  >>repeat orthostatics still positive  - folate b12 TSH wnl    #Acute Right Olecranon Comminuted Fracture  -OR tomorrow by orthopedics  -cardio clearance: see initial consult note  -needs medical clearance  -preop labs complete (see AM labs today) T&S and coags ordered for 11am    #Elevated Troponin  #Likely NSTEMI II  #History of CAD s/p CABG  - Follows with Dr Combs  - Troponin 0.03/0.02 05/08  - ECG with no ischemic changes  - trops elevated but stable  - c/w asa  - Not on statin (patient and daughter not sure why)  - Check lipid profile and Hba1c   - f/u TTE  - dc tele    #HO Atrial Fibrillation and SSS s/p Biv ICD (Medtronic)  #HO Prolonged QTc  - Not on AC due to high risk of falls   - c/w Amiodarone 100mg QD  -  ECG 05/08   - s/p interrogation of Biv ICD medtronic on 05/08: functioning normally with no events  - Repeat ECG in AM since QTc 527 05/08, likel2/2 biv   - Avoid QTc prolonging agents  - Keep off AC  - Keep K>4 and Mg>2    #Macrocytic Anemia  - Baseline Hb 10.2 06/21/2021  - ED Hb 9.9, .7  - No prior studies done  - Trend CBC  - Active T/S  - iron studies anemia of chronic dz?; tsh, b12 folate wnl  - Resume home B12 supplements 100mcg QD    #HTN  - BP ok  - c/w home meds     #CKD  - Baseline 1.7-1.9 2020  - ED BUN 23, Cr 1.5  - Trend CMP and Phos  - avoid nephrotoxic agents     Others  - DVT Prophylaxis: SCDs  - GI Prophylaxis: Pantoprazole 40mg PO QD  - Diet: NPO @[place] for ortho surgery  - Code Status: Full     PENDING orthostatics work up; orthopedics surgery tomorrow; needs medical clearance           89 year old male with CAD s/p CABG, afib/SSS s/p Biv ICD medtronic,, HTN, CKD, and GERD who presented to the ED on 05/08 following an episode of a fall complicated by posterior head lacerations, found to have a large right Tempero-Parietal and small left parietal hematomas and found to have right comminuted fracture of the elbow, admitted for syncope w/u. Orthostatics positive. Midodrine, alonzo stockings ordered pending repeat orthostatics. Going for OR tomorrow with orthopedics.    #Syncope most likely 2/2 orthostatic HoTN  #Large Right Frontoparietal and Small Left Parietal Hematoma-stable  - EP: s/p interrogation of Biv ICD medtronic on 05/08: functioning normally with no events  - Cardiology: recommend TTE no further work up (ACS ruled out)  - orthostatics positive, add alonzo stalkings add midodrine 2.5 q8  >>repeat orthostatics still positive, will add abd binder and recheck   - folate b12 TSH wnl    #Acute Right Olecranon Comminuted Fracture  -OR tomorrow by orthopedics  -cardio clearance: see initial consult note  -needs medical clearance  -preop labs complete (see AM labs today) T&S and coags ordered for 11am    Attending note: Med clearance: pt is Average- Above average risk in NSQIP score. Cardio clearance Mod risk. This serves only as a carlita-operative medical risk stratification and evaluation to predict medical complications risk and mortality. The decision to proceed with the surgery/procedure is made by the performing physician and the patient. If any acute changes happen prior to surgery, this medical clearance is void and will need a new medical clearance.     #Elevated Troponin  #Likely NSTEMI II  #History of CAD s/p CABG  - Follows with Dr Combs  - Troponin 0.03/0.02 05/08  - ECG with no ischemic changes  - trops elevated but stable  - c/w asa  - Not on statin (patient and daughter not sure why)  - Check lipid profile and Hba1c   - f/u TTE  - dc tele    #HO Atrial Fibrillation and SSS s/p Biv ICD (Medtronic)  #HO Prolonged QTc  - Not on AC due to high risk of falls   - c/w Amiodarone 100mg QD  -  ECG 05/08   - s/p interrogation of Biv ICD medtronic on 05/08: functioning normally with no events  - Repeat ECG in AM since QTc 527 05/08, likel2/2 biv   - Avoid QTc prolonging agents  - Keep off AC  - Keep K>4 and Mg>2    #Macrocytic Anemia  - Baseline Hb 10.2 06/21/2021  - ED Hb 9.9, .7  - No prior studies done  - Trend CBC  - Active T/S  - iron studies anemia of chronic dz?; tsh, b12 folate wnl  - Resume home B12 supplements 100mcg QD    #HTN  - BP ok  - c/w home meds     #CKD  - Baseline 1.7-1.9 2020  - ED BUN 23, Cr 1.5  - Trend CMP and Phos  - avoid nephrotoxic agents     Others  - DVT Prophylaxis: SCDs  - GI Prophylaxis: Pantoprazole 40mg PO QD  - Diet: NPO @[place] for ortho surgery  - Code Status: Full     PENDING orthostatics work up; orthopedics surgery tomorrow; needs medical clearance

## 2022-05-11 NOTE — CHART NOTE - NSCHARTNOTEFT_GEN_A_CORE
Psychiatry was contacted by Dr. Gannon from Anesthesia due to patient's combative/disorganized behavior immediately after his surgery. Patient is most likely suffering from delirium given his age, circumstances, and lack of known psychiatric history. Of note, his QT >500msec such that we would not recommend use of antipsychotic medication at this time. In addition, we advise against benzodiazepines and anticholinergics as these are likely to only worsen patient's confusion. If agitation worsens to the point that he is an acute danger to himself/others, would recommend considering use of precedex. But for now, recommend that environmental modifications and behavioral interventions be employed, and that 1:1 be ordered for patient's safety.

## 2022-05-11 NOTE — PROGRESS NOTE ADULT - ASSESSMENT
89 year old male with CAD s/p CABG, afib/SSS s/p Biv ICD medtronic,, HTN, CKD, and GERD who presented to the ED on 05/08 following an episode of a fall complicated by posterior head lacerations, found to have a large right Tempero-Parietal and small left parietal hematomas and found to have right comminuted fracture of the elbow, admitted for syncope w/u. Orthostatics positive. Midodrine, alonzo stockings ordered pending repeat orthostatics. Going for OR tomorrow with orthopedics.    A/P:   Syncope and Head Trauma:   Syncope is likely due to orthostatic hypotension.   EKG showed ventricular paced rhythm.   ICD interrogation showed no events.   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 Large Right Frontoparietal scalp hematoma and Small Left Parietal scalp Hematoma.  Echo showed LVEF 35-40%, grade I diastolic dysfunction, severe LAE, mild AR, mild to mod MR, mild TR.   Started on Midodrine, ( Idon't think he will need it), discontinue Amlodipine, Labetalol switched to Metoprolol hopefully will reduce the orthostatic changes.   Continue Compression socks and abdominal binder, orthostatic improved.    Acute Right Olecranon Comminuted Fracture  Plan for surgery fixation today.     Chronic HFrEF:   Stable, euvolemic on exam.   Echo as above.   On Labetalol and Lisinopril, patient will benefit from Metoprolol for CHF, switch to Metoprolol 25mg BID  Hold Amlodipine 2.5mg daily (small dose with negative inotropic effect).    CAD s/p CABG  mild troponin elevation after the fall, insignificant and stable.   Continue ASA, Metoprolol,     Chronic Atrial Fibrillation and SSS s/p Biv ICD (Medtronic)  Not on AC due to high risk of falls   Continue Metoprolol and  Amiodarone 100mg QD    Macrocytic Anemia  Baseline Hb 10.2 06/21/2021  Hb is trending down, possibly from hematoma.   Iron studies compatible with chronic disease. B12 389.     CKD  - Baseline 1.7-1.9 2020  Cr 1.3    DVT Prophylaxis: SCDs  Code Status: Full     #Progress Note Handoff:  Pending (specify):  right elbow surgery today.   Family discussion: with his daughter, update about plan for surgery and anemia  Disposition: possibly rehab inpatient vs short term after surgery, will need physiatry consult.

## 2022-05-11 NOTE — BRIEF OPERATIVE NOTE - ASSISTANT(S)
----- Message from Mitzi Davis sent at 2/24/2022  3:36 PM CST -----  Vm- daughter is calling for refill on flodopine   [facility]   [phone]     [place]

## 2022-05-11 NOTE — PROGRESS NOTE ADULT - SUBJECTIVE AND OBJECTIVE BOX
IDRIS BACON  89y  Male      Patient is a 89y old  Male who presents with a chief complaint of Syncope (11 May 2022 11:39)      INTERVAL HPI/OVERNIGHT EVENTS:  He feels ok, no new complaints.   Vital Signs Last 24 Hrs  T(C): 36.9 (11 May 2022 15:00), Max: 36.9 (11 May 2022 15:00)  T(F): 98.4 (11 May 2022 15:00), Max: 98.4 (11 May 2022 15:00)  HR: 74 (11 May 2022 15:00) (69 - 87)  BP: 134/63 (11 May 2022 15:00) (92/51 - 146/65)  BP(mean): --  RR: 18 (11 May 2022 15:00) (18 - 18)  SpO2: 98% (11 May 2022 15:00) (98% - 98%)      05-10-22 @ 07:01  -  05-11-22 @ 07:00  --------------------------------------------------------  IN: 1030 mL / OUT: 300 mL / NET: 730 mL    05-11-22 @ 07:01  -  05-11-22 @ 15:09  --------------------------------------------------------  IN: 0 mL / OUT: 180 mL / NET: -180 mL            Consultant(s) Notes Reviewed:  [x ] YES  [ ] NO          MEDICATIONS  (STANDING):  aMIOdarone   Oral Tab/Cap - Peds 100 milliGRAM(s) Oral daily  aspirin  chewable 81 milliGRAM(s) Oral daily  chlorhexidine 4% Liquid 1 Application(s) Topical <User Schedule>  cyanocobalamin 1000 MICROGram(s) Oral daily  dextrose 5% + sodium chloride 0.45%. 1000 milliLiter(s) (50 mL/Hr) IV Continuous <Continuous>  heparin   Injectable 5000 Unit(s) SubCutaneous every 12 hours  lisinopril 40 milliGRAM(s) Oral daily  metoprolol tartrate 25 milliGRAM(s) Oral two times a day  midodrine. 2.5 milliGRAM(s) Oral three times a day  pantoprazole    Tablet 40 milliGRAM(s) Oral before breakfast    MEDICATIONS  (PRN):  acetaminophen     Tablet .. 650 milliGRAM(s) Oral every 6 hours PRN Mild Pain (1 - 3)      LABS                          7.7    7.96  )-----------( 217      ( 11 May 2022 06:11 )             23.3     05-11    138  |  104  |  24<H>  ----------------------------<  96  4.2   |  23  |  1.3    Ca    8.7      11 May 2022 06:11  Mg     2.2     05-11    TPro  6.8  /  Alb  3.1<L>  /  TBili  0.8  /  DBili  x   /  AST  20  /  ALT  9   /  AlkPhos  44  05-11        PT/INR - ( 10 May 2022 12:25 )   PT: 14.40 sec;   INR: 1.25 ratio         PTT - ( 10 May 2022 12:25 )  PTT:33.6 sec  Lactate Trend    CARDIAC MARKERS ( 11 May 2022 06:11 )  x     / 0.03 ng/mL / x     / x     / x          CAPILLARY BLOOD GLUCOSE            RADIOLOGY & ADDITIONAL TESTS:    Imaging Personally Reviewed:  [ ] YES  [ ] NO    HEALTH ISSUES - PROBLEM Dx:          PHYSICAL EXAM:  GENERAL: NAD, well-developed.  HEAD:  Atraumatic, Normocephalic.  EYES: EOMI, PERRLA, conjunctiva and sclera clear.  NECK: Supple, No JVD.  CHEST/LUNG: Clear to auscultation bilaterally; No wheeze.  HEART: Regular rate and rhythm; S1 S2.   ABDOMEN: Soft, Nontender, Nondistended; Bowel sounds present.  EXTREMITIES: RUE with dressing and cast.   PSYCH: AAOx3.  NEUROLOGY: non-focal.  SKIN: No rashes or lesions.

## 2022-05-11 NOTE — PROGRESS NOTE ADULT - ASSESSMENT
89 year old male with CAD s/p CABG, afib/SSS s/p Biv ICD medtronic,, HTN, CKD, and GERD who presented to the ED on 05/08 following an episode of a fall complicated by posterior head lacerations, found to have a large right Tempero-Parietal and small left parietal hematomas and found to have right comminuted fracture of the elbow, admitted for syncope w/u. Orthostatics positive. Midodrine, alonzo stockings ordered pending repeat orthostatics. Going for OR tomorrow with orthopedics.    #Syncope most likely 2/2 orthostatic HoTN  #Large Right Frontoparietal and Small Left Parietal Hematoma-stable  - EP: s/p interrogation of Biv ICD medtronic on 05/08: functioning normally with no events  - Cardiology: recommend TTE no further work up (ACS ruled out)  - orthostatics positive, add alonzo stalkings add midodrine 2.5 q8; still positive; added abdominal binder  >>repeat orthostatics with abdominal binder were negative  - folate b12 TSH wnl    #Acute Right Olecranon Comminuted Fracture  -OR tomorrow by orthopedics  -cardio clearance: see initial consult note  -needs medical clearance  -preop labs complete (see AM labs today) T&S and coags ordered for 11am    Attending note: Med clearance: pt is Average- Above average risk in NSQIP score. Cardio clearance Mod risk. This serves only as a carlita-operative medical risk stratification and evaluation to predict medical complications risk and mortality. The decision to proceed with the surgery/procedure is made by the performing physician and the patient. If any acute changes happen prior to surgery, this medical clearance is void and will need a new medical clearance.     #Elevated Troponin  #Likely NSTEMI II  #History of CAD s/p CABG  - Follows with Dr Combs  - Troponin 0.03/0.02 05/08  - ECG with no ischemic changes  - trops elevated but stable  - c/w asa  - Not on statin (patient and daughter not sure why)  - Check lipid profile and Hba1c   - TTE: Left ventricular ejection fraction, by visual estimation, is 35 to   40%. G1DD; enlarged LA  - no events on monitor; dc tele    #HO Atrial Fibrillation and SSS s/p Biv ICD (Medtronic)  #HO Prolonged QTc  - Not on AC due to high risk of falls   - c/w Amiodarone 100mg QD  -  ECG 05/08   - s/p interrogation of Biv ICD medtronic on 05/08: functioning normally with no events  - Repeat ECG in AM since QTc 527 05/08, likel2/2 biv   - Avoid QTc prolonging agents  - Keep off AC  - Keep K>4 and Mg>2    #Macrocytic Anemia  - Baseline Hb 10.2 06/21/2021  - ED Hb 9.9, .7  - Trending down; monitor cbc; transfuse if necessary  - Active T/S  - iron studies anemia of chronic dz?; tsh, b12 folate wnl  - Resume home B12 supplements 100mcg QD    #HTN  - BP ok  - c/w home meds     #CKD  - Baseline 1.7-1.9 2020  - ED BUN 23, Cr 1.5  - Trend CMP and Phos  - avoid nephrotoxic agents     Others  - DVT Prophylaxis: SCDs  - GI Prophylaxis: Pantoprazole 40mg PO QD  - Diet: NPO @[place] for ortho surgery  - Code Status: Full     PENDING surgery today; monitor hb may need prbc transfusion           89 year old male with CAD s/p CABG, afib/SSS s/p Biv ICD medtronic,, HTN, CKD, and GERD who presented to the ED on 05/08 following an episode of a fall complicated by posterior head lacerations, found to have a large right Tempero-Parietal and small left parietal hematomas and found to have right comminuted fracture of the elbow, admitted for syncope w/u. Orthostatics positive. Midodrine, alonzo stockings ordered pending repeat orthostatics. Going for OR tomorrow with orthopedics.    #Syncope most likely 2/2 orthostatic HoTN  #Large Right Frontoparietal and Small Left Parietal Hematoma-stable  - EP: s/p interrogation of Biv ICD medtronic on 05/08: functioning normally with no events  - Cardiology: recommend TTE no further work up (ACS ruled out)  - orthostatics positive, add alonzo stalkings add midodrine 2.5 q8; still positive; added abdominal binder  >>repeat orthostatics with abdominal binder were negative  - folate b12 TSH wnl    #Acute Right Olecranon Comminuted Fracture  -OR today by orthopedics  -cardio clearance: see initial consult note  -Medical clearance see below  -preop labs complete (see AM labs today) T&S and coags ordered    Attending note: Med clearance: pt is Average- Above average risk in NSQIP score. Cardio clearance Mod risk. This serves only as a carlita-operative medical risk stratification and evaluation to predict medical complications risk and mortality. The decision to proceed with the surgery/procedure is made by the performing physician and the patient. If any acute changes happen prior to surgery, this medical clearance is void and will need a new medical clearance.     #Elevated Troponin  #Likely NSTEMI II  #History of CAD s/p CABG  - Follows with Dr Combs  - Troponin 0.03/0.02 05/08  - ECG with no ischemic changes  - trops elevated but stable  - c/w asa  - Not on statin (patient and daughter not sure why)  - Check lipid profile and Hba1c   - TTE: Left ventricular ejection fraction, by visual estimation, is 35 to   40%. G1DD; enlarged LA  - no events on monitor; dc tele    #HO Atrial Fibrillation and SSS s/p Biv ICD (Medtronic)  #HO Prolonged QTc  - Not on AC due to high risk of falls   - c/w Amiodarone 100mg QD  -  ECG 05/08   - s/p interrogation of Biv ICD medtronic on 05/08: functioning normally with no events  - Repeat ECG in AM since QTc 527 05/08, likel2/2 biv   - Avoid QTc prolonging agents  - Keep off AC  - Keep K>4 and Mg>2    #Macrocytic Anemia  - Baseline Hb 10.2 06/21/2021  - ED Hb 9.9, .7  - Trending down; monitor cbc; transfuse if necessary  - Active T/S  - iron studies anemia of chronic dz?; tsh, b12 folate wnl  - Resume home B12 supplements 100mcg QD    #HTN  - BP ok  - c/w home meds     #CKD  - Baseline 1.7-1.9 2020  - ED BUN 23, Cr 1.5  - Trend CMP and Phos  - avoid nephrotoxic agents     Others  - DVT Prophylaxis: SCDs  - GI Prophylaxis: Pantoprazole 40mg PO QD  - Diet: NPO @[place] for ortho surgery  - Code Status: Full     PENDING surgery today; monitor hb may need prbc transfusion

## 2022-05-11 NOTE — CHART NOTE - NSCHARTNOTEFT_GEN_A_CORE
PACU ANESTHESIA ADMISSION NOTE      Procedure: ORIF, fracture, olecranon, right      Post op diagnosis:  Intra-articular fracture of olecranon process of right ulna            __x__  Patent Airway    __x__  Full return of protective reflexes    _x___  Full recovery from anesthesia / back to baseline     Vitals:   T:   98        R:      12            BP:   136/72               Sat:    198%               P: 90      Mental Status:  ___x_ Awake   __x___ Alert   _____ Drowsy   _____ Sedated    Nausea/Vomiting:  ____ NO  ______Yes,   x   See Post - Op Orders          Pain Scale (0-10):  _____    Treatment: ____ None    _x___ See Post - Op/PCA Orders    Post - Operative Fluids:   ____ Oral   ___x_ See Post - Op Orders    Plan: Discharge:   ____Home       ___x__Floor     _____Critical Care    _____  Other:_________________    Comments:    Pt tolerated procedure well, no anesthesia related complications. Care of pt endorsed to PACU, report given to PACU RN. Discharge when criteria are met.

## 2022-05-11 NOTE — BRIEF OPERATIVE NOTE - NSICDXBRIEFPOSTOP_GEN_ALL_CORE_FT
POST-OP DIAGNOSIS:  Intra-articular fracture of olecranon process of right ulna 11-May-2022 16:47:41  Charles Ford

## 2022-05-11 NOTE — BRIEF OPERATIVE NOTE - NSICDXBRIEFPREOP_GEN_ALL_CORE_FT
PRE-OP DIAGNOSIS:  Displaced intra-articular fracture of olecranon process of right ulna 11-May-2022 16:47:25  Charles Ford

## 2022-05-11 NOTE — PROGRESS NOTE ADULT - SUBJECTIVE AND OBJECTIVE BOX
CHIEF COMPLAINT:  Patient is a 89y old  Male who presents with a chief complaint of Syncope (10 May 2022 10:34)      INTERVAL HISTORY/OVERNIGHT EVENTS:  pt feels well today  going for OR with orthopedics R olecranon fracture  received 4 hr fluids d5 1/2 ns    Hb dropping 7.7 this AM; will order 1u prbc on hold for OR if needed      ======================  MEDICATIONS:  aMIOdarone   Oral Tab/Cap - Peds 100 milliGRAM(s) Oral daily  amLODIPine   Tablet 2.5 milliGRAM(s) Oral daily  aspirin  chewable 81 milliGRAM(s) Oral daily  chlorhexidine 4% Liquid 1 Application(s) Topical <User Schedule>  cyanocobalamin 1000 MICROGram(s) Oral daily  heparin   Injectable 5000 Unit(s) SubCutaneous every 12 hours  labetalol 100 milliGRAM(s) Oral two times a day  lisinopril 40 milliGRAM(s) Oral daily  midodrine. 2.5 milliGRAM(s) Oral three times a day  pantoprazole    Tablet 40 milliGRAM(s) Oral before breakfast    DRIPS:  dextrose 5% + sodium chloride 0.45%. 1000 milliLiter(s) (50 mL/Hr) IV Continuous <Continuous>    PRN:       ======================  PHYSICAL EXAMINATION:  GEN:  nad.   HEENT:  eomi. ncat. significant bruising noted on parts of head.  PULM:  b/l bs.  clear.  no wheezing. no crackles or rales.   CARD: regular. s1, s2.  no murmurs.   ABD: +bs. ntnd  EXT:  no new rashes.  no pitting edema b/l . laceration left forearm healing; R arm in cast  NEURO:  no new focal deficits.   ======================  OBJECTIVE:        VS:  T(F): 98.3 (05-11 @ 07:30), Max: 98.3 (05-11 @ 04:34)  HR: 87 (05-11 @ 07:30) (69 - 87)  BP: 118/70 (05-11 @ 07:30) (92/51 - 146/65)  RR: 18 (05-11 @ 07:30) (18 - 18)  SpO2: 98% (05-11 @ 07:30) (98% - 98%)  CVP(mm Hg): --  CO: --  CI: --  PA: --  PCWP: --    I/O:      05-10 @ 07:01  -  05-11 @ 07:00  --------------------------------------------------------  IN: 1030 mL / OUT: 300 mL / NET: 730 mL    05-11 @ 07:01  -  05-11 @ 11:39  --------------------------------------------------------  IN: 0 mL / OUT: 180 mL / NET: -180 mL        Weight trend:  Weight (kg): 83 (05-11)    ======================    LABS:                          7.7    7.96  )-----------( 217      ( 11 May 2022 06:11 )             23.3     05-11    138  |  104  |  24<H>  ----------------------------<  96  4.2   |  23  |  1.3    Ca    8.7      11 May 2022 06:11  Mg     2.2     05-11    TPro  6.8  /  Alb  3.1<L>  /  TBili  0.8  /  DBili  x   /  AST  20  /  ALT  9   /  AlkPhos  44  05-11    LIVER FUNCTIONS - ( 11 May 2022 06:11 )  Alb: 3.1 g/dL / Pro: 6.8 g/dL / ALK PHOS: 44 U/L / ALT: 9 U/L / AST: 20 U/L / GGT: x           PT/INR - ( 10 May 2022 12:25 )   PT: 14.40 sec;   INR: 1.25 ratio         PTT - ( 10 May 2022 12:25 )  PTT:33.6 sec  Troponin T, Serum: 0.03 ng/mL (05-11 @ 06:11)    CARDIAC MARKERS ( 11 May 2022 06:11 )  x     / 0.03 ng/mL / x     / x     / x                Cultures:         CHIEF COMPLAINT:  Patient is a 89y old  Male who presents with a chief complaint of Syncope (10 May 2022 10:34)      INTERVAL HISTORY/OVERNIGHT EVENTS:  pt feels well today  going for OR with orthopedics R olecranon fracture  received 4 hr fluids d5 1/2 ns    Hb dropping 7.7 this AM; will order 1u prbc on hold for OR if needed  pt asymptomatic for now  no cp, sob, dizziness      ======================  MEDICATIONS:  aMIOdarone   Oral Tab/Cap - Peds 100 milliGRAM(s) Oral daily  amLODIPine   Tablet 2.5 milliGRAM(s) Oral daily  aspirin  chewable 81 milliGRAM(s) Oral daily  chlorhexidine 4% Liquid 1 Application(s) Topical <User Schedule>  cyanocobalamin 1000 MICROGram(s) Oral daily  heparin   Injectable 5000 Unit(s) SubCutaneous every 12 hours  labetalol 100 milliGRAM(s) Oral two times a day  lisinopril 40 milliGRAM(s) Oral daily  midodrine. 2.5 milliGRAM(s) Oral three times a day  pantoprazole    Tablet 40 milliGRAM(s) Oral before breakfast    DRIPS:  dextrose 5% + sodium chloride 0.45%. 1000 milliLiter(s) (50 mL/Hr) IV Continuous <Continuous>    PRN:       ======================  PHYSICAL EXAMINATION:  GEN:  nad.   HEENT:  eomi. ncat. significant bruising noted on parts of head.  PULM:  b/l bs.  clear.  no wheezing. no crackles or rales.   CARD: regular. s1, s2.  no murmurs.   ABD: +bs. ntnd  EXT:  no new rashes.  no pitting edema b/l . laceration left forearm healing; R arm in cast  NEURO:  no new focal deficits.   ======================  OBJECTIVE:        VS:  T(F): 98.3 (05-11 @ 07:30), Max: 98.3 (05-11 @ 04:34)  HR: 87 (05-11 @ 07:30) (69 - 87)  BP: 118/70 (05-11 @ 07:30) (92/51 - 146/65)  RR: 18 (05-11 @ 07:30) (18 - 18)  SpO2: 98% (05-11 @ 07:30) (98% - 98%)  CVP(mm Hg): --  CO: --  CI: --  PA: --  PCWP: --    I/O:      05-10 @ 07:01  -  05-11 @ 07:00  --------------------------------------------------------  IN: 1030 mL / OUT: 300 mL / NET: 730 mL    05-11 @ 07:01  -  05-11 @ 11:39  --------------------------------------------------------  IN: 0 mL / OUT: 180 mL / NET: -180 mL        Weight trend:  Weight (kg): 83 (05-11)    ======================    LABS:                          7.7    7.96  )-----------( 217      ( 11 May 2022 06:11 )             23.3     05-11    138  |  104  |  24<H>  ----------------------------<  96  4.2   |  23  |  1.3    Ca    8.7      11 May 2022 06:11  Mg     2.2     05-11    TPro  6.8  /  Alb  3.1<L>  /  TBili  0.8  /  DBili  x   /  AST  20  /  ALT  9   /  AlkPhos  44  05-11    LIVER FUNCTIONS - ( 11 May 2022 06:11 )  Alb: 3.1 g/dL / Pro: 6.8 g/dL / ALK PHOS: 44 U/L / ALT: 9 U/L / AST: 20 U/L / GGT: x           PT/INR - ( 10 May 2022 12:25 )   PT: 14.40 sec;   INR: 1.25 ratio         PTT - ( 10 May 2022 12:25 )  PTT:33.6 sec  Troponin T, Serum: 0.03 ng/mL (05-11 @ 06:11)    CARDIAC MARKERS ( 11 May 2022 06:11 )  x     / 0.03 ng/mL / x     / x     / x                Cultures:

## 2022-05-11 NOTE — PRE-OP CHECKLIST - AS BP NONINV METHOD
Just a note to advise how the patient is progressing in the tobacco cessation program.  The patient is smoking 20 cigarettes per day and commended patient on the reduction. The patient remains on the prescribed tobacco cessation medication regimen of 1 mg Chantix BID without any negative side effects at this time. The patient stated he feels the Chantix is helping much more than the patches ever did with cutting back and remaining comfortable. CO 28 ppm with the last cigarette being 60 minutes prior.  electronic

## 2022-05-11 NOTE — PROGRESS NOTE ADULT - SUBJECTIVE AND OBJECTIVE BOX
Orthopaedic Surgery Postoperative Check Note    Procedure: Right olecranon ORIF  EBL: None    Pt S&E after above procedure. Pt resting comfortably. Not alert / oriented. Vitals stable.    Vitals:  T(C): 36.7 (05-11-22 @ 19:42), Max: 36.9 (05-11-22 @ 15:00)  HR: 85 (05-11-22 @ 19:57) (69 - 92)  BP: 124/61 (05-11-22 @ 19:57) (118/70 - 137/65)  RR: 16 (05-11-22 @ 19:57) (14 - 18)  SpO2: 95% (05-11-22 @ 19:57) (95% - 99%)    P/E:  NAD  Nonlabored breathing    RUE  Splint in place, c/d/i  Compartments soft/compressible, no pain w/ passive stretch  SILT ax/r/m/u  AIN/PIN/U intact  Radial pulse 2+, hand wwp    Labs:                        7.7    7.96  )-----------( 217      ( 11 May 2022 06:11 )             23.3       A/P:   Pt in stable condition after above procedure    Weight bearing: NWB RUE  AM labs  DVT ppx: lovenox/heparin, scds  Postop abx for 24 hrs: Ancef x24h  Diet ok  Pain control  Home medications  PT/OT/rehab  Please page Ortho w/ any questions/concerns   Orthopaedic Surgery Postoperative Check Note    Procedure: Right olecranon ORIF  EBL: None    Pt S&E after above procedure. Pt resting comfortably. Not alert / oriented. Vitals stable. Received 1U PRBC pre-op    Vitals:  T(C): 36.7 (05-11-22 @ 19:42), Max: 36.9 (05-11-22 @ 15:00)  HR: 85 (05-11-22 @ 19:57) (69 - 92)  BP: 124/61 (05-11-22 @ 19:57) (118/70 - 137/65)  RR: 16 (05-11-22 @ 19:57) (14 - 18)  SpO2: 95% (05-11-22 @ 19:57) (95% - 99%)    P/E:  NAD  Nonlabored breathing    RUE  Splint in place, c/d/i  Compartments soft/compressible, no pain w/ passive stretch  SILT ax/r/m/u  AIN/PIN/U intact  Radial pulse 2+, hand wwp    Labs:                        7.7    7.96  )-----------( 217      ( 11 May 2022 06:11 )             23.3       A/P:   Pt in stable condition after above procedure    Weight bearing: NWB RUE  AM labs  DVT ppx: chem, scds  Postop abx for 24 hrs: Ancef x24h  Diet ok  Trend CBC  Pain control  Home medications  PT/OT/rehab  Please page Ortho w/ any questions/concerns

## 2022-05-12 NOTE — PROGRESS NOTE ADULT - SUBJECTIVE AND OBJECTIVE BOX
IDRIS BACON 89y Male  MRN#: [id number]   CODE STATUS: Full    Hospital Day: 4d    Pt is currently admitted with the primary diagnosis of syncope    SUBJECTIVE  Hospital Course  Pt seen and examined at bedside. s/p ORIF for Rt elbow fx. AOx3, pleasant. Pending PT eval tomorrow to determine inpt vs NH vs home rehab. Medically stable s/p surgery. d/lanny 1:1.     Overnight events   Agitation, resolved    Subjective complaints   None                                          ----------------------------------------------------------  OBJECTIVE  PAST MEDICAL & SURGICAL HISTORY  Heart disease    Hypertension    Skin cancer    History of open heart surgery    FHx: skin cancer                                              -----------------------------------------------------------  ALLERGIES:  No Known Allergies                                            ------------------------------------------------------------    HOME MEDICATIONS  Home Medications:  amiodarone 100 mg oral tablet: 1 tab(s) orally once a day (04 Aug 2021 07:22)  amLODIPine 2.5 mg oral tablet: 1 tab(s) orally once a day (04 Aug 2021 07:22)  Aspirin Low Dose 81 mg oral tablet, chewable: 1 tab(s) orally once a day (04 Aug 2021 07:22)  benazepril 40 mg oral tablet: 1 tab(s) orally once a day (04 Aug 2021 07:22)  labetalol 100 mg oral tablet: 1 tab(s) orally 2 times a day (04 Aug 2021 07:22)  omeprazole 20 mg oral delayed release capsule: 1 cap(s) orally once a day (04 Aug 2021 07:22)  Vitamin B12 50 mcg oral tablet: 2 tab(s) orally once a day (08 May 2022 21:54)                           MEDICATIONS:  STANDING MEDICATIONS  aMIOdarone   Oral Tab/Cap - Peds 100 milliGRAM(s) Oral daily  chlorhexidine 4% Liquid 1 Application(s) Topical <User Schedule>  cyanocobalamin 1000 MICROGram(s) Oral daily  dextrose 5% + sodium chloride 0.45%. 1000 milliLiter(s) IV Continuous <Continuous>  heparin   Injectable 5000 Unit(s) SubCutaneous every 12 hours  lisinopril 40 milliGRAM(s) Oral daily  metoprolol tartrate 25 milliGRAM(s) Oral two times a day  midodrine. 2.5 milliGRAM(s) Oral three times a day  pantoprazole    Tablet 40 milliGRAM(s) Oral before breakfast  polyethylene glycol 3350 Oral Powder - Peds 17 Gram(s) Oral two times a day  senna 2 Tablet(s) Oral at bedtime    PRN MEDICATIONS  acetaminophen     Tablet .. 650 milliGRAM(s) Oral every 6 hours PRN                                            ------------------------------------------------------------  VITAL SIGNS: Last 24 Hours  T(C): 37.3 (12 May 2022 13:18), Max: 37.3 (12 May 2022 13:18)  T(F): 99.1 (12 May 2022 13:18), Max: 99.1 (12 May 2022 13:18)  HR: 84 (12 May 2022 13:18) (80 - 98)  BP: 126/52 (12 May 2022 13:18) (124/61 - 170/63)  BP(mean): --  RR: 18 (12 May 2022 13:18) (14 - 22)  SpO2: 97% (12 May 2022 05:21) (95% - 98%)      05-11-22 @ 07:01  -  05-12-22 @ 07:00  --------------------------------------------------------  IN: 266.3 mL / OUT: 380 mL / NET: -113.7 mL                                             --------------------------------------------------------------  LABS:                        8.3    6.33  )-----------( 221      ( 12 May 2022 06:37 )             24.3     05-12    139  |  104  |  26<H>  ----------------------------<  123<H>  4.7   |  22  |  1.2    Ca    8.5      12 May 2022 06:37  Mg     2.0     05-12    TPro  6.8  /  Alb  3.1<L>  /  TBili  0.8  /  DBili  x   /  AST  20  /  ALT  9   /  AlkPhos  44  05-11      CARDIAC MARKERS ( 11 May 2022 06:11 )  x     / 0.03 ng/mL / x     / x     / x                                                -------------------------------------------------------------  RADIOLOGY:  < from: [facility] Duplex Lower Ext Vein Scan, Bilat (05.09.22 @ 08:52) >  IMPRESSION:  No evidence of deep venous thrombosis in either lower extremity.          --- End of Report ---    < end of copied text >  < from: Xray Elbow AP + Lateral + Oblique, Right (05.08.22 @ 17:48) >  Impression:  Interval splint placement of right ulna olecranon process fracture.    --- End of Report ---    < end of copied text >  < from: Xray Forearm, Right (05.08.22 @ 16:14) >  Impression:  Acute, comminuted displaced right ulna olecranon process fracture.    --- End of Report ---    < end of copied text >  < from: Xray Elbow AP + Lateral + Oblique, Right (05.08.22 @ 16:14) >  Impression:  Acute, comminuted displaced right ulna olecranon process fracture.    --- End of Report ---    < end of copied text >                                            --------------------------------------------------------------    PHYSICAL EXAM:  General:   HEENT:  LUNGS:  HEART:  ABDOMEN:  EXT:  NEURO:  SKIN:                                           --------------------------------------------------------------    ASSESSMENT & PLAN    Past medical history and hospital course                                                                                                           ----------------------------------------------------  # DVT prophylaxis     # GI prophylaxis     # Diet     # Activity Score (AM-PAC)    # Code status     # Disposition                                                                              --------------------------------------------------------    # Handoff      IDRIS BACON 89y Male  MRN#: [id number]   CODE STATUS: Full    Hospital Day: 4d    Pt is currently admitted with the primary diagnosis of syncope    SUBJECTIVE  Hospital Course  Pt seen and examined at bedside. s/p ORIF for Rt elbow fx. AOx3, pleasant. Pending PT eval tomorrow to determine inpt vs NH vs home rehab. Medically stable s/p surgery. d/lanny 1:1.     Overnight events   Agitation, resolved    Subjective complaints   None                                          ----------------------------------------------------------  OBJECTIVE  PAST MEDICAL & SURGICAL HISTORY  Heart disease    Hypertension    Skin cancer    History of open heart surgery    FHx: skin cancer                                              -----------------------------------------------------------  ALLERGIES:  No Known Allergies                                            ------------------------------------------------------------    HOME MEDICATIONS  Home Medications:  amiodarone 100 mg oral tablet: 1 tab(s) orally once a day (04 Aug 2021 07:22)  amLODIPine 2.5 mg oral tablet: 1 tab(s) orally once a day (04 Aug 2021 07:22)  Aspirin Low Dose 81 mg oral tablet, chewable: 1 tab(s) orally once a day (04 Aug 2021 07:22)  benazepril 40 mg oral tablet: 1 tab(s) orally once a day (04 Aug 2021 07:22)  labetalol 100 mg oral tablet: 1 tab(s) orally 2 times a day (04 Aug 2021 07:22)  omeprazole 20 mg oral delayed release capsule: 1 cap(s) orally once a day (04 Aug 2021 07:22)  Vitamin B12 50 mcg oral tablet: 2 tab(s) orally once a day (08 May 2022 21:54)                           MEDICATIONS:  STANDING MEDICATIONS  aMIOdarone   Oral Tab/Cap - Peds 100 milliGRAM(s) Oral daily  chlorhexidine 4% Liquid 1 Application(s) Topical <User Schedule>  cyanocobalamin 1000 MICROGram(s) Oral daily  dextrose 5% + sodium chloride 0.45%. 1000 milliLiter(s) IV Continuous <Continuous>  heparin   Injectable 5000 Unit(s) SubCutaneous every 12 hours  lisinopril 40 milliGRAM(s) Oral daily  metoprolol tartrate 25 milliGRAM(s) Oral two times a day  midodrine. 2.5 milliGRAM(s) Oral three times a day  pantoprazole    Tablet 40 milliGRAM(s) Oral before breakfast  polyethylene glycol 3350 Oral Powder - Peds 17 Gram(s) Oral two times a day  senna 2 Tablet(s) Oral at bedtime    PRN MEDICATIONS  acetaminophen     Tablet .. 650 milliGRAM(s) Oral every 6 hours PRN                                            ------------------------------------------------------------  VITAL SIGNS: Last 24 Hours  T(C): 37.3 (12 May 2022 13:18), Max: 37.3 (12 May 2022 13:18)  T(F): 99.1 (12 May 2022 13:18), Max: 99.1 (12 May 2022 13:18)  HR: 84 (12 May 2022 13:18) (80 - 98)  BP: 126/52 (12 May 2022 13:18) (124/61 - 170/63)  BP(mean): --  RR: 18 (12 May 2022 13:18) (14 - 22)  SpO2: 97% (12 May 2022 05:21) (95% - 98%)      05-11-22 @ 07:01  -  05-12-22 @ 07:00  --------------------------------------------------------  IN: 266.3 mL / OUT: 380 mL / NET: -113.7 mL                                             --------------------------------------------------------------  LABS:                        8.3    6.33  )-----------( 221      ( 12 May 2022 06:37 )             24.3     05-12    139  |  104  |  26<H>  ----------------------------<  123<H>  4.7   |  22  |  1.2    Ca    8.5      12 May 2022 06:37  Mg     2.0     05-12    TPro  6.8  /  Alb  3.1<L>  /  TBili  0.8  /  DBili  x   /  AST  20  /  ALT  9   /  AlkPhos  44  05-11      CARDIAC MARKERS ( 11 May 2022 06:11 )  x     / 0.03 ng/mL / x     / x     / x                                                -------------------------------------------------------------  RADIOLOGY:  < from: [facility] Duplex Lower Ext Vein Scan, Bilat (05.09.22 @ 08:52) >  IMPRESSION:  No evidence of deep venous thrombosis in either lower extremity.    --- End of Report ---    < end of copied text >  < from: Xray Elbow AP + Lateral + Oblique, Right (05.08.22 @ 17:48) >  Impression:  Interval splint placement of right ulna olecranon process fracture.    --- End of Report ---    < end of copied text >  < from: Xray Forearm, Right (05.08.22 @ 16:14) >  Impression:  Acute, comminuted displaced right ulna olecranon process fracture.    --- End of Report ---    < end of copied text >  < from: Xray Elbow AP + Lateral + Oblique, Right (05.08.22 @ 16:14) >  Impression:  Acute, comminuted displaced right ulna olecranon process fracture.    --- End of Report ---    < end of copied text >  < from: Xray Humerus, Right (05.08.22 @ 16:13) >  Impression:  Acute, comminuted displaced right ulna olecranon process fracture. Right   humerus intact.    --- End of Report ---    < end of copied text >  < from: Xray Pelvis AP only (05.08.22 @ 14:59) >  IMPRESSION: No fractures seen    --- End of Report ---    < end of copied text >  < from: CT Cervical Spine No Cont (05.08.22 @ 14:35) >  IMPRESSION:    CT HEAD:  No acute intracranial findings.    Moderate chronic microvascular ischemic changes.    Large right temporoparietal scalp hematoma and small left parietal scalp   hematoma. No underlying calvarial fracture.    CT CERVICAL SPINE:  No acute cervical fracture or dislocation.    --- End of Report ---    < end of copied text >                                          --------------------------------------------------------------    PHYSICAL EXAM:  General: NAD, AOx3  HEENT: Normocephalic, atraumatic, post scalp laceration sp repair, ?Xanthelasmata  LUNGS: Normal breath sounds, no wheezes/crackles  HEART: S1s2, no mrg  ABDOMEN: Soft, NT/ND. No rigidity/guarding  EXT: Rt arm in elbow cast s/p ORIF, hand distally wnl  NEURO: generalized weakness, Rt arm in cast  SKIN: Scattered mild ecchymoses                                           --------------------------------------------------------------

## 2022-05-12 NOTE — PROGRESS NOTE ADULT - ASSESSMENT
89 year old male with CAD s/p CABG, afib/SSS s/p Biv ICD medtronic,, HTN, CKD, and GERD who presented to the ED on 05/08 following an episode of a fall complicated by posterior head lacerations, found to have a large right Tempero-Parietal and small left parietal hematomas and found to have right comminuted fracture of the elbow, admitted for syncope w/u. Orthostatics positive. Midodrine, alonzo stockings ordered pending repeat orthostatics. Going for OR tomorrow with orthopedics.    A/P:   Syncope and Head Trauma:   Syncope is likely due to orthostatic hypotension.   EKG showed ventricular paced rhythm.   ICD interrogation showed no events. 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 Large Right Frontoparietal scalp hematoma and Small Left Parietal scalp Hematoma.  Echo showed LVEF 35-40%, grade I diastolic dysfunction, severe LAE, mild AR, mild to mod MR, mild TR.   Started on Midodrine, ( Idon't think he will need it), discontinue Amlodipine, Labetalol switched to Metoprolol hopefully will reduce the orthostatic changes.   Continue Compression socks and abdominal binder, orthostatic improved.    ·	Acute Right Olecranon Comminuted Fracture  -POD #1 - S/P ORIF LEFT OLECRANON   -Ortho follow up appreciated   -pain control prn   -rehab/pt/OT as tolerated     ·	Chronic HFrEF:   Stable, euvolemic on exam.   Echo as above.   On Labetalol and Lisinopril, patient will benefit from Metoprolol for CHF, switch to Metoprolol 25mg BID  Hold Amlodipine 2.5mg daily (small dose with negative inotropic effect).    ·	CAD s/p CABG  mild troponin elevation after the fall, insignificant and stable.   Continue ASA, Metoprolol,     ·	Chronic Atrial Fibrillation and SSS s/p Biv ICD (Medtronic)  Not on AC due to high risk of falls   Continue Metoprolol and  Amiodarone 100mg QD    ·	Macrocytic Anemia  Baseline Hb 10.2 06/21/2021  Hb is trending down, possibly from hematoma.   Iron studies compatible with chronic disease. B12 389.     ·	CKD Stage III   -Cr 1.2; this am     DVT Prophylaxis: SCDs  Code Status: Full       # Progress Note Handoff  PENDING as follows  consults: CM for d/c planning   Family discussion: discussed with patient; comprehends his medical care - agreeable for d/c planning; aware of the possibility of STR option; wants to discuss with CM   Disposition: STR    Attending Physician Dr. Aminata Styles # [id number]

## 2022-05-12 NOTE — PROGRESS NOTE ADULT - SUBJECTIVE AND OBJECTIVE BOX
IDRIS BACON  89y  Male      Patient is a 89y old  Male who presents with a chief complaint of Syncope (11 May 2022 11:39)      INTERVAL HPI/OVERNIGHT EVENTS:  -pt seen and examined at bedside this morning   -d/c planning to STR   -CM assistance with discharge planning     Vital Signs Last 24 Hrs  T(C): 35.9 (12 May 2022 05:21), Max: 36.9 (11 May 2022 15:00)  T(F): 96.6 (12 May 2022 05:21), Max: 98.4 (11 May 2022 15:00)  HR: 97 (12 May 2022 05:21) (74 - 98)  BP: 127/58 (12 May 2022 05:21) (124/61 - 170/63)  RR: 18 (12 May 2022 05:21) (14 - 22)  SpO2: 97% (12 May 2022 05:21) (95% - 99%)      05-10-22 @ 07:01  -  05-11-22 @ 07:00  --------------------------------------------------------  IN: 1030 mL / OUT: 300 mL / NET: 730 mL    05-11-22 @ 07:01  -  05-11-22 @ 15:09  --------------------------------------------------------  IN: 0 mL / OUT: 180 mL / NET: -180 mL    PHYSICAL EXAM:  GENERAL: NAD, speaking in full sentences   HEAD:  Atraumatic, Normocephalic  EYES: EOMI, PERRLA, conjunctiva and sclera clear  NERVOUS SYSTEM:  Alert & Oriented X 3  CHEST/LUNG: Clear to percussion bilaterally; No rales, rhonchi, wheezing, or rubs  HEART: Regular rate and rhythm; No murmurs, rubs, or gallops  ABDOMEN: Soft, Nontender, Nondistended; Bowel sounds present  EXTREMITIES:  Right arm sling/cast in place   SKIN: chronic skin changes LE     Consultant(s) Notes Reviewed:  [x ] YES  [ ] NO        LABS                                   8.3    6.33  )-----------( 221      ( 12 May 2022 06:37 )             24.3   05-12    139  |  104  |  26<H>  ----------------------------<  123<H>  4.7   |  22  |  1.2    Ca    8.5      12 May 2022 06:37  Mg     2.0     05-12      PT/INR - ( 10 May 2022 12:25 )   PT: 14.40 sec;   INR: 1.25 ratio         PTT - ( 10 May 2022 12:25 )  PTT:33.6 sec  Lactate Trend    CARDIAC MARKERS ( 11 May 2022 06:11 )  x     / 0.03 ng/mL / x     / x     / x          CAPILLARY BLOOD GLUCOSE      RADIOLOGY & ADDITIONAL TESTS:    Imaging Personally visualized and Reviewed:  [ y ] YES  [ ] NO    MEDICATIONS  (STANDING):  aMIOdarone   Oral Tab/Cap - Peds 100 milliGRAM(s) Oral daily  chlorhexidine 4% Liquid 1 Application(s) Topical <User Schedule>  cyanocobalamin 1000 MICROGram(s) Oral daily  dextrose 5% + sodium chloride 0.45%. 1000 milliLiter(s) (50 mL/Hr) IV Continuous <Continuous>  heparin   Injectable 5000 Unit(s) SubCutaneous every 12 hours  lactated ringers. 1000 milliLiter(s) (75 mL/Hr) IV Continuous <Continuous>  lisinopril 40 milliGRAM(s) Oral daily  metoprolol tartrate 25 milliGRAM(s) Oral two times a day  midodrine. 2.5 milliGRAM(s) Oral three times a day  pantoprazole    Tablet 40 milliGRAM(s) Oral before breakfast  polyethylene glycol 3350 Oral Powder - Peds 17 Gram(s) Oral two times a day  senna 2 Tablet(s) Oral at bedtime    MEDICATIONS  (PRN):  acetaminophen     Tablet .. 650 milliGRAM(s) Oral every 6 hours PRN Mild Pain (1 - 3)  HYDROmorphone  Injectable 0.5 milliGRAM(s) IV Push every 10 minutes PRN Severe Pain (7 - 10)  morphine  - Injectable 2 milliGRAM(s) IV Push every 10 minutes PRN Mild Pain (1 - 3)  ondansetron Injectable 4 milliGRAM(s) IV Push once PRN Nausea and/or Vomiting  oxycodone    5 mG/acetaminophen 325 mG 1 Tablet(s) Oral once PRN Moderate Pain (4 - 6)

## 2022-05-12 NOTE — PROGRESS NOTE ADULT - SUBJECTIVE AND OBJECTIVE BOX
POD# S/P ORIF LEFT OLECRANON   T(C): 35.9 (05-12-22 @ 05:21), Max: 36.9 (05-11-22 @ 15:00)  HR: 97 (05-12-22 @ 05:21) (69 - 98)  BP: 127/58 (05-12-22 @ 05:21) (124/61 - 170/63)  RR: 18 (05-12-22 @ 05:21) (14 - 22)  SpO2: 97% (05-12-22 @ 05:21) (95% - 99%)                        8.3    6.33  )-----------( 221      ( 12 May 2022 06:37 )             24.3     05-12    139  |  104  |  26<H>  ----------------------------<  123<H>  4.7   |  22  |  1.2    Ca    8.5      12 May 2022 06:37  Mg     2.0     05-12    TPro  6.8  /  Alb  3.1<L>  /  TBili  0.8  /  DBili  x   /  AST  20  /  ALT  9   /  AlkPhos  44  05-11    PT/INR - ( 10 May 2022 12:25 )   PT: 14.40 sec;   INR: 1.25 ratio         PTT - ( 10 May 2022 12:25 )  PTT:33.6 sec    PTS PAIN IS CONTROLLED   WOUND IS CDI DRESSING/ SPLINT INTACT   N/V/I  ABX COMPLETE  DVT PROPHYLAXIS IN PLACE  SLING FOR COMFORT   STABLE ORTHOPEDICALLY FOR DISCHARGE   D/C PLAN PENDING

## 2022-05-12 NOTE — CHART NOTE - NSCHARTNOTEFT_GEN_A_CORE
Device: BiV-ICD Medtronic    Indication: Postop   Interrogation completed via remote transmission last night at 23:00. Device functioning normally.     Mode: DDDR  bpm  Battery: 17 months    Events: None since last interrogation on 5/8/22    Recommendations: Routine f/u as outpatient with Dr. River    EPS [id number]

## 2022-05-12 NOTE — PROGRESS NOTE ADULT - ASSESSMENT
ASSESSMENT & PLAN    89 year old male with CAD s/p CABG, afib/SSS s/p Biv ICD medtronic,, HTN, CKD, and GERD who presented to the ED on 05/08 following an episode of a fall complicated by posterior head lacerations, found to have a large right Tempero-Parietal and small left parietal hematomas and found to have right comminuted fracture of the elbow, admitted for syncope w/u. Orthostatics positive. Midodrine, alonzo stockings ordered pending repeat orthostatics. Going for OR tomorrow with orthopedics.    #Syncope most likely 2/2 orthostatic HoTN  #Large Right Frontoparietal and Small Left Parietal Hematoma-stable  - EP: s/p interrogation of Biv ICD medtronic on 05/08: functioning normally with no events  - Cardiology: TTE: EF 35-40%, G1DD, Increased LA size  - orthostatics positive, added alonzo stockings, added midodrine 2.5 q8; still positive; added abdominal binder  >>repeat orthostatics with abdominal binder were negative  - folate b12 TSH wnl    #Acute Right Olecranon Comminuted Fracture  -cardio clearance: see initial consult note  -Medical clearance see prior note  -s/p ORIF with ortho 5/11, no post-op complications on POD #1    #Elevated Troponin  #Likely NSTEMI II  #History of CAD s/p CABG  - Follows with Dr Combs  - Troponin 0.03/0.02 05/08  - ECG with no ischemic changes  - c/w asa  - Not on statin (patient and daughter not sure why)  - Lipid profile wnl, A1c 5.4%  - No events on Tele, d/lanny    #HO Atrial Fibrillation and SSS s/p Biv ICD (Medtronic)  #HO Prolonged QTc  - Not on AC due to high risk of falls   - c/w Amiodarone 100mg QD  - EKG 05/08   - TTE: EF 35-40%, G1DD, Increased LA size  - s/p interrogation of Biv ICD medtronic on 05/08: functioning normally with no event  - Avoid QTc prolonging agents  - Keep off AC  - Keep K>4 and Mg>2    #Macrocytic Anemia  - Baseline Hb 10.2 06/21/2021  - ED Hb 9.9, .7 - Hb stable  - Active T/S, Hb > 8  - iron studies anemia of chronic dz; tsh, b12 folate wnl  - Resumed home B12 supplements 100mcg QD    #CKD  - Baseline 1.7-1.9 2020  - ED BUN 23, Cr 1.5 -> 1.2  - Trend CMP and Phos  - avoid nephrotoxic agents     #HTN  - BP ok  - c/w home meds     #Handoff: Pt here s/p fall found to have orthostatic hypotension. Fall complicated by comminuted Rt elbow fx s/p ORIF with ortho 5/11. POD # 1 no complications. Pt pending PT eval 5/13 to determine rehab needs. Agreeable to plan and anticipated for DC within 24-48 hours.    Others  - DVT Prophylaxis: SCDs  - GI Prophylaxis: Pantoprazole 40mg PO QD  - Diet: DASH/TLC  - Code Status: Full  - Dispo: NH vs home w/home PT vs 4A; pending PT eval tomorrow

## 2022-05-13 NOTE — PROGRESS NOTE ADULT - SUBJECTIVE AND OBJECTIVE BOX
IDRIS BACON  89y  Male      Patient is a 89y old  Male who presents with a chief complaint of Syncope (11 May 2022 11:39)      INTERVAL HPI/OVERNIGHT EVENTS:  -pt seen and examined at bedside this morning   -d/c planning to STR vs. 4A - pending Physiatry evaluation   -CM assistance with discharge planning     Vital Signs Last 24 Hrs  T(C): 36.6 (13 May 2022 12:42), Max: 37.3 (12 May 2022 19:50)  T(F): 97.9 (13 May 2022 12:42), Max: 99.2 (12 May 2022 19:50)  HR: 70 (13 May 2022 12:42) (69 - 76)  BP: 106/56 (13 May 2022 12:42) (106/56 - 132/61)  BP(mean): --  RR: 18 (13 May 2022 12:42) (18 - 19)  SpO2: --    05-10-22 @ 07:01  -  05-11-22 @ 07:00  --------------------------------------------------------  IN: 1030 mL / OUT: 300 mL / NET: 730 mL    05-11-22 @ 07:01  -  05-11-22 @ 15:09  --------------------------------------------------------  IN: 0 mL / OUT: 180 mL / NET: -180 mL    PHYSICAL EXAM:  GENERAL: NAD, speaking in full sentences   HEAD:  Atraumatic, Normocephalic  EYES: EOMI, PERRLA, conjunctiva and sclera clear  NERVOUS SYSTEM:  Alert & Oriented X 3  CHEST/LUNG: Clear to percussion bilaterally; No rales, rhonchi, wheezing, or rubs  HEART: Regular rate and rhythm; No murmurs, rubs, or gallops  ABDOMEN: Soft, Nontender, Nondistended; Bowel sounds present  EXTREMITIES:  Right arm sling/cast in place   SKIN: chronic skin changes LE     Consultant(s) Notes Reviewed:  [x ] YES  [ ] NO        LABS                        8.3    9.13  )-----------( 259      ( 13 May 2022 07:59 )             25.3   05-13    139  |  104  |  33<H>  ----------------------------<  88  4.4   |  25  |  1.3    Ca    8.6      13 May 2022 07:59  Mg     2.2     05-13    TPro  6.4  /  Alb  2.8<L>  /  TBili  0.8  /  DBili  x   /  AST  18  /  ALT  <5  /  AlkPhos  46  05-13    CAPILLARY BLOOD GLUCOSE      RADIOLOGY & ADDITIONAL TESTS:    Imaging Personally visualized and Reviewed:  [ y ] YES  [ ] NO    MEDICATIONS  (STANDING):  aMIOdarone   Oral Tab/Cap - Peds 100 milliGRAM(s) Oral daily  chlorhexidine 4% Liquid 1 Application(s) Topical <User Schedule>  cyanocobalamin 1000 MICROGram(s) Oral daily  heparin   Injectable 5000 Unit(s) SubCutaneous every 12 hours  lactated ringers. 1000 milliLiter(s) (75 mL/Hr) IV Continuous <Continuous>  lisinopril 40 milliGRAM(s) Oral daily  metoprolol tartrate 25 milliGRAM(s) Oral two times a day  midodrine. 2.5 milliGRAM(s) Oral three times a day  pantoprazole    Tablet 40 milliGRAM(s) Oral before breakfast  polyethylene glycol 3350 Oral Powder - Peds 17 Gram(s) Oral two times a day  senna 2 Tablet(s) Oral at bedtime    MEDICATIONS  (PRN):  acetaminophen     Tablet .. 650 milliGRAM(s) Oral every 6 hours PRN Mild Pain (1 - 3)  HYDROmorphone  Injectable 0.5 milliGRAM(s) IV Push every 10 minutes PRN Severe Pain (7 - 10)  morphine  - Injectable 2 milliGRAM(s) IV Push every 10 minutes PRN Mild Pain (1 - 3)  ondansetron Injectable 4 milliGRAM(s) IV Push once PRN Nausea and/or Vomiting  oxycodone    5 mG/acetaminophen 325 mG 1 Tablet(s) Oral once PRN Moderate Pain (4 - 6)

## 2022-05-13 NOTE — CONSULT NOTE ADULT - ASSESSMENT
IMPRESSION: Rehab of right elbow fx / s/p fall / gait dysfunction     PRECAUTIONS: [   ] Cardiac  [   ] Respiratory  [   ] Seizures [   ] Contact Isolation  [   ] Droplet Isolation  [   ] Other    Weight Bearing Status: NWB RUE    RECOMMENDATION:    Out of Bed to Chair     DVT/Decubiti Prophylaxis    REHAB PLAN:     [ x   ] Bedside P/T 3-5 times a week   [x    ]   Bedside O/T  2-3 times a week             [    ] Speech Therapy               [    ]  No Rehab Therapy Indicated   Conditioning/ROM                                    ADL  Bed Mobility                                               Conditioning/ROM  Transfers                                                     Bed Mobility  Sitting /Standing Balance                         Transfers                                        Gait Training                                               Sitting/Standing Balance  Stair Training [   ]Applicable                    Home equipment Eval                                                                        Splinting  [   ] Only      GOALS:   ADL   [  x  ]   Independent                    Transfers  [  x  ] Independent                          Ambulation  [  x  ] Independent     [  x   ] With device                            [    ]  CG                                                         [    ]  CG                                                                  [    ] CG                            [    ] Min A                                                   [    ] Min A                                                              [    ] Min  A          DISCHARGE PLAN:   [    ]  Good candidate for Intensive Rehabilitation/Hospital based                                             Will tolerate 3hrs Intensive Rehab Daily                                       [  x   ]  Short Term Rehab in Skilled Nursing Facility                                       [     ]  Home with Outpatient or  services                                         [     ]  Possible Candidate for Intensive Hospital based Rehab

## 2022-05-13 NOTE — CONSULT NOTE ADULT - SUBJECTIVE AND OBJECTIVE BOX
HPI:  History of Present Illness  Mr. Leslie is a 89 year old male patient known to have:  - Baseline AO3, lives alone, ambulates independently  - HTN  - CAD s/p CABG 25 years ago. Follows with Dr Combs  - History of atrial Fibrillation and SSS s/p Biv ICD Medtronic (non MR compatible). Not on AC due to high risk of falls per daughter  - CKD (baseline 1.7-1.9 2020)  - GERD  - Skin Cancer      He was brought to the ED on 05/08 after an episode of syncope/fall.  History goes back to this morning after breakfast when the patient was washing dishes when he felt light headed and fell backward to hit his head on the wooden floor.  Per patient, prior to fall, he only complained of light headedness and blurry vision with no CP, palpitations, diaphoresis, or blacking out.  During the fall, he had HT but denied LOC or seizure like activity.  After fall, he noted a laceration on back of head. He stood up and took some time before he was able to understand what happened.  He called his brother who lives next door who found a pool of blood on the floor.      On review of systems, patient denies any recent fever, chills, night sweats, URTI symptoms (cough, rhinorrhea, sore throat), urinary symptoms (urinary frequency, urgency, intermittence, dysuria, foul smelling urine, cloudy urine), change in bowel movements (diarrhea or constipation), abdominal pain, headache, nausea, or vomiting.   No sick contacts.   No recent travel or exposure to recent travelers.      Upon presentation to the ED, the patient was hemodynamically stable:  Vital Signs in ED   - /72mmHg  - HR 80 bpm  - RR 18bpm  - T 36.7  - SaO2 99%      Investigations   Laboratory Workup  - CBC:                        9.9    11.36 )-----------( 246      ( 08 May 2022 13:52 )             29.7     - Chemistry:  05-08    136  |  102  |  23<H>  ----------------------------<  113<H>  4.7   |  23  |  1.5    Ca    9.0      08 May 2022 13:52  Mg     2.3     05-08    TPro  7.9  /  Alb  3.6  /  TBili  0.8  /  DBili  x   /  AST  13  /  ALT  7   /  AlkPhos  61  05-08    - Cardiac Markers:  CARDIAC MARKERS ( 08 May 2022 18:19 )  x     / 0.02 ng/mL / x     / x     / x      CARDIAC MARKERS ( 08 May 2022 13:52 )  x     / 0.03 ng/mL / x     / x     / x          Radiological Workup  * CT Head No Cont (05.08.22 @ 14:27) CT HEAD No acute intracranial findings. Moderate chronic microvascular ischemic changes. Large right temporoparietal scalp hematoma and small left parietal scalp hematoma. No underlying calvarial fracture.  * CT CERVICAL SPINE: No acute cervical fracture or dislocation.  * Xray Pelvis AP only (05.08.22 @ 14:59) No fractures seen  * XR right elbow and forearm revealed comminuted right olecranon fracture    - Patient was evaluated by orthopedic team: s/p right posterior elbow splint and side struts  - He is s/p posterior head laceration repairs  - He was also evaluated by EP who interrogated device Biv ICD: functioning normally with no events  - He will be admitted for further syncope work up and surgical intervention for comminuted right olecranon fracture  -s/p ORIF of right olecranon fx on 5/11,  NWLOCO SIGALA as per ortho      PAST MEDICAL & SURGICAL HISTORY:  Heart disease      Hypertension      Skin cancer      History of open heart surgery      FHx: skin cancer          Hospital Course:    TODAY'S SUBJECTIVE & REVIEW OF SYMPTOMS:     Constitutional WNL   Cardio WNL   Resp WNL   GI WNL  Heme WNL  Endo WNL  Skin WNL  MSK pain  Neuro WNL  Cognitive WNL  Psych WNL      MEDICATIONS  (STANDING):  aMIOdarone   Oral Tab/Cap - Peds 100 milliGRAM(s) Oral daily  aspirin  chewable 81 milliGRAM(s) Oral daily  chlorhexidine 4% Liquid 1 Application(s) Topical <User Schedule>  cyanocobalamin 1000 MICROGram(s) Oral daily  heparin   Injectable 5000 Unit(s) SubCutaneous every 12 hours  lisinopril 40 milliGRAM(s) Oral daily  metoprolol tartrate 25 milliGRAM(s) Oral two times a day  midodrine. 2.5 milliGRAM(s) Oral three times a day  pantoprazole    Tablet 40 milliGRAM(s) Oral before breakfast  polyethylene glycol 3350 Oral Powder - Peds 17 Gram(s) Oral two times a day  senna 2 Tablet(s) Oral at bedtime    MEDICATIONS  (PRN):  acetaminophen     Tablet .. 650 milliGRAM(s) Oral every 6 hours PRN Mild Pain (1 - 3)      FAMILY HISTORY:      Allergies    No Known Allergies    Intolerances        SOCIAL HISTORY:    [  ] Etoh  [  ] Smoking  [  ] Substance abuse     Home Environment:  [ x  ] Home Alone  [   ] Lives with Family  [   ] Home Health Aid    Dwelling:  [   ] Apartment  [  x ] Private House  [   ] Adult Home  [   ] Skilled Nursing Facility      [   ] Short Term  [   ] Long Term  [x   ] Stairs       Elevator [   ]    FUNCTIONAL STATUS PTA: (Check all that apply)  Ambulation: [  x  ]Independent    [   ] Dependent     [   ] Non-Ambulatory  Assistive Device: [   ] SA Cane  [   ]  Q Cane  [   ] Walker  [   ]  Wheelchair  ADL : [ x  ] Independent  [    ]  Dependent       Vital Signs Last 24 Hrs  T(C): 36.6 (13 May 2022 12:42), Max: 37.3 (12 May 2022 19:50)  T(F): 97.9 (13 May 2022 12:42), Max: 99.2 (12 May 2022 19:50)  HR: 70 (13 May 2022 12:42) (69 - 76)  BP: 106/56 (13 May 2022 12:42) (106/56 - 132/61)  BP(mean): --  RR: 18 (13 May 2022 12:42) (18 - 19)  SpO2: --      PHYSICAL EXAM: Awake & Alert  GENERAL: NAD  HEAD:  Normocephalic  CHEST/LUNG: Clear   HEART: S1S2+  ABDOMEN: Soft, Nontender  EXTREMITIES:  no calf tenderness, right elbow in splint     NERVOUS SYSTEM:  Cranial Nerves 2-12 intact [   ] Abnormal  [   ]  ROM: WFL all extremities [   ]  Abnormal [x   ]limited RUE  Motor Strength: WFL all extremities  [   ]  Abnormal [ x  ]limited RUE  Sensation: intact to light touch [  x ] Abnormal [   ]    FUNCTIONAL STATUS:  Bed Mobility: Independent [   ]  Supervision [   ]  Needs Assistance [ x  ]  N/A [   ]  Transfers: Independent [   ]  Supervision [   ]  Needs Assistance [  x ]  N/A [   ]   Ambulation: Independent [   ]  Supervision [   ]  Needs Assistance [  x ]  N/A [   ]  ADL: Independent [   ] Requires Assistance [   ] N/A [   ]      LABS:                        8.3    9.13  )-----------( 259      ( 13 May 2022 07:59 )             25.3     05-13    139  |  104  |  33<H>  ----------------------------<  88  4.4   |  25  |  1.3    Ca    8.6      13 May 2022 07:59  Mg     2.2     05-13    TPro  6.4  /  Alb  2.8<L>  /  TBili  0.8  /  DBili  x   /  AST  18  /  ALT  <5  /  AlkPhos  46  05-13          RADIOLOGY & ADDITIONAL STUDIES:

## 2022-05-13 NOTE — PROGRESS NOTE ADULT - SUBJECTIVE AND OBJECTIVE BOX
IDRIS BACON 89y Male  MRN#: [id number]   CODE STATUS: Full    Hospital Day: 5d    Pt is currently admitted with the primary diagnosis of Syncope    SUBJECTIVE  Hospital Course  Pt seen and examined at bedside. Medically stable for DC pending PT/Physiatry eval for STR vs 4A rehab.    Overnight events   None    Subjective complaints   None                                            ----------------------------------------------------------  OBJECTIVE  PAST MEDICAL & SURGICAL HISTORY  Heart disease    Hypertension    Skin cancer    History of open heart surgery    FHx: skin cancer                                              -----------------------------------------------------------  ALLERGIES:  No Known Allergies                                            ------------------------------------------------------------    HOME MEDICATIONS  Home Medications:  amiodarone 100 mg oral tablet: 1 tab(s) orally once a day (04 Aug 2021 07:22)  amLODIPine 2.5 mg oral tablet: 1 tab(s) orally once a day (04 Aug 2021 07:22)  Aspirin Low Dose 81 mg oral tablet, chewable: 1 tab(s) orally once a day (04 Aug 2021 07:22)  benazepril 40 mg oral tablet: 1 tab(s) orally once a day (04 Aug 2021 07:22)  labetalol 100 mg oral tablet: 1 tab(s) orally 2 times a day (04 Aug 2021 07:22)  omeprazole 20 mg oral delayed release capsule: 1 cap(s) orally once a day (04 Aug 2021 07:22)  Vitamin B12 50 mcg oral tablet: 2 tab(s) orally once a day (08 May 2022 21:54)                           MEDICATIONS:  STANDING MEDICATIONS  aMIOdarone   Oral Tab/Cap - Peds 100 milliGRAM(s) Oral daily  chlorhexidine 4% Liquid 1 Application(s) Topical <User Schedule>  cyanocobalamin 1000 MICROGram(s) Oral daily  heparin   Injectable 5000 Unit(s) SubCutaneous every 12 hours  lisinopril 40 milliGRAM(s) Oral daily  metoprolol tartrate 25 milliGRAM(s) Oral two times a day  midodrine. 2.5 milliGRAM(s) Oral three times a day  pantoprazole    Tablet 40 milliGRAM(s) Oral before breakfast  polyethylene glycol 3350 Oral Powder - Peds 17 Gram(s) Oral two times a day  senna 2 Tablet(s) Oral at bedtime    PRN MEDICATIONS  acetaminophen     Tablet .. 650 milliGRAM(s) Oral every 6 hours PRN                                            ------------------------------------------------------------  VITAL SIGNS: Last 24 Hours  T(C): 36.6 (13 May 2022 12:42), Max: 37.3 (12 May 2022 19:50)  T(F): 97.9 (13 May 2022 12:42), Max: 99.2 (12 May 2022 19:50)  HR: 70 (13 May 2022 12:42) (69 - 76)  BP: 106/56 (13 May 2022 12:42) (106/56 - 132/61)  BP(mean): --  RR: 18 (13 May 2022 12:42) (18 - 19)  SpO2: --                                             --------------------------------------------------------------  LABS:                        8.3    9.13  )-----------( 259      ( 13 May 2022 07:59 )             25.3     05-13    139  |  104  |  33<H>  ----------------------------<  88  4.4   |  25  |  1.3    Ca    8.6      13 May 2022 07:59  Mg     2.2     05-13    TPro  6.4  /  Alb  2.8<L>  /  TBili  0.8  /  DBili  x   /  AST  18  /  ALT  <5  /  AlkPhos  46  05-13                                            -------------------------------------------------------------  RADIOLOGY:  < from: [facility] Duplex Lower Ext Vein Scan, Bilat (05.09.22 @ 08:52) >  IMPRESSION:  No evidence of deep venous thrombosis in either lower extremity.    --- End of Report ---    < end of copied text >  < from: Xray Elbow AP + Lateral + Oblique, Right (05.08.22 @ 17:48) >  Impression:  Interval splint placement of right ulna olecranon process fracture.    --- End of Report ---    < end of copied text >  < from: Xray Forearm, Right (05.08.22 @ 16:14) >  Impression:  Acute, comminuted displaced right ulna olecranon process fracture.    --- End of Report ---    < end of copied text >  < from: Xray Elbow AP + Lateral + Oblique, Right (05.08.22 @ 16:14) >  Impression:  Acute, comminuted displaced right ulna olecranon process fracture.    --- End of Report ---    < end of copied text >  < from: Xray Humerus, Right (05.08.22 @ 16:13) >  Impression:  Acute, comminuted displaced right ulna olecranon process fracture. Right   humerus intact.    --- End of Report ---    < end of copied text >  < from: Xray Pelvis AP only (05.08.22 @ 14:59) >  IMPRESSION: No fractures seen    --- End of Report ---    < end of copied text >  < from: CT Cervical Spine No Cont (05.08.22 @ 14:35) >  IMPRESSION:    CT HEAD:  No acute intracranial findings.    Moderate chronic microvascular ischemic changes.    Large right temporoparietal scalp hematoma and small left parietal scalp   hematoma. No underlying calvarial fracture.    CT CERVICAL SPINE:  No acute cervical fracture or dislocation.    --- End of Report ---    < end of copied text >                                          --------------------------------------------------------------    PHYSICAL EXAM:  General: NAD, AOx3  HEENT: Normocephalic, atraumatic, post scalp laceration sp repair, ?Xanthelasmata  LUNGS: Normal breath sounds, no wheezes/crackles  HEART: S1s2, no mrg  ABDOMEN: Soft, NT/ND. No rigidity/guarding  EXT: Rt arm in elbow cast s/p ORIF, hand distally wnl  NEURO: generalized weakness, Rt arm in cast  SKIN: Scattered mild ecchymoses                                         --------------------------------------------------------------

## 2022-05-13 NOTE — PROGRESS NOTE ADULT - ASSESSMENT
ASSESSMENT & PLAN    89 year old male with CAD s/p CABG, afib/SSS s/p Biv ICD medtronic,, HTN, CKD, and GERD who presented to the ED on 05/08 following an episode of a fall complicated by posterior head lacerations, found to have a large right Tempero-Parietal and small left parietal hematomas and found to have right comminuted fracture of the elbow, admitted for syncope w/u. Orthostatics positive. Midodrine, alonzo stockings ordered pending repeat orthostatics. Going for OR tomorrow with orthopedics.    #Syncope most likely 2/2 orthostatic HoTN  #Large Right Frontoparietal and Small Left Parietal Hematoma-stable  - EP: s/p interrogation of Biv ICD medtronic on 05/08: functioning normally with no events  - Cardiology: TTE: EF 35-40%, G1DD, Increased LA size  - orthostatics positive, added alonzo stockings, added midodrine 2.5 q8; still positive; added abdominal binder  >>repeat orthostatics with abdominal binder were negative  - folate b12 TSH wnl    #Acute Right Olecranon Comminuted Fracture  -cardio clearance: see initial consult note  -Medical clearance see prior note  -s/p ORIF with ortho 5/11, no post-op complications on POD #2    #Elevated Troponin  #Likely NSTEMI II  #History of CAD s/p CABG  - Follows with Dr Combs  - Troponin 0.03/0.02 05/08  - ECG with no ischemic changes  - c/w asa  - Not on statin (patient and daughter not sure why)  - Lipid profile wnl, A1c 5.4%  - No events on Tele, d/lanny    #HO Atrial Fibrillation and SSS s/p Biv ICD (Medtronic)  #HO Prolonged QTc  - Not on AC due to high risk of falls   - c/w Amiodarone 100mg QD  - EKG 05/08   - TTE: EF 35-40%, G1DD, Increased LA size  - s/p interrogation of Biv ICD medtronic on 05/08: functioning normally with no event  - Avoid QTc prolonging agents  - Keep off AC  - Keep K>4 and Mg>2    #Macrocytic Anemia  - Baseline Hb 10.2 06/21/2021  - ED Hb 9.9, .7 - Hb stable  - Active T/S, Hb > 8  - iron studies anemia of chronic dz; tsh, b12 folate wnl  - Resumed home B12 supplements 100mcg QD    #CKD  - Baseline 1.7-1.9 2020  - ED BUN 23, Cr 1.5 -> 1.2  - Trend CMP and Phos  - avoid nephrotoxic agents     #HTN  - BP ok  - c/w home meds     #Handoff: Pt here s/p fall found to have orthostatic hypotension. Fall complicated by comminuted Rt elbow fx s/p ORIF with ortho 5/11. POD # 2 no complications. Pt pending PT/Physiatry eval 5/13 to determine rehab needs. Medically  stable for DC.    Others  - DVT Prophylaxis: SCDs  - GI Prophylaxis: Pantoprazole 40mg PO QD  - Diet: DASH/TLC  - Code Status: Full  - Dispo: NH vs home w/home PT vs 4A; pending PT/Physiatry eval. Medically stable

## 2022-05-13 NOTE — DISCHARGE NOTE PROVIDER - NSDCCPCAREPLAN_GEN_ALL_CORE_FT
PRINCIPAL DISCHARGE DIAGNOSIS  Diagnosis: Syncope  Assessment and Plan of Treatment: Syncope is when you temporarily lose consciousness, also called fainting or passing out. It is caused by a sudden decrease in blood flow to the brain. Even though most causes of syncope are not dangerous, syncope can possibly be a sign of a serious medical problem. Signs that you may be about to faint include feeling dizzy, lightheaded, nausea, visual changes, or cold/clammy skin.   You underwent a workup which ruled out a neurological and cardiac cause of syncope. Your head CT scan was normal and an echo of your heart showed an ejection fraction of 40% but no abnormalities. You had no arrhythmias on telemetry. You were found to have orthostatic hypotension and were started on alonzo stockings and an abdominal binder as well as a medication called midodrine which you should continue taking.  SEEK IMMEDIATE MEDICAL CARE IF YOU HAVE ANY OF THE FOLLOWING SYMPTOMS: severe headache, pain in your chest/abdomen/back, bleeding from your mouth or rectum, palpitations, shortness of breath, pain with breathing, seizure, confusion, or trouble walking.        SECONDARY DISCHARGE DIAGNOSES  Diagnosis: Laceration of scalp  Assessment and Plan of Treatment:     Diagnosis: Elbow fracture, right  Assessment and Plan of Treatment: After your fall, you were found to have a comminuted right elbow fracture on imaging, and were taken to surgery by orthopedics who performed an open reduction and internal fixation. Your arm was placed in a cast and splint and your digits were normal. You will be continuing physical therapy upon discharge.  Please follow up with orthopedics in the clinic for a routine visit post-op.     PRINCIPAL DISCHARGE DIAGNOSIS  Diagnosis: Syncope  Assessment and Plan of Treatment: Syncope is when you temporarily lose consciousness, also called fainting or passing out. It is caused by a sudden decrease in blood flow to the brain. Even though most causes of syncope are not dangerous, syncope can possibly be a sign of a serious medical problem. Signs that you may be about to faint include feeling dizzy, lightheaded, nausea, visual changes, or cold/clammy skin.   You underwent a workup which ruled out a neurological and cardiac cause of syncope. Your head CT scan was normal and an echo of your heart showed an ejection fraction of 40% but no abnormalities. You had no arrhythmias on telemetry. You were found to have orthostatic hypotension and were started on alonzo stockings and an abdominal binder as well as a medication called midodrine which you should continue taking.  SEEK IMMEDIATE MEDICAL CARE IF YOU HAVE ANY OF THE FOLLOWING SYMPTOMS: severe headache, pain in your chest/abdomen/back, bleeding from your mouth or rectum, palpitations, shortness of breath, pain with breathing, seizure, confusion, or trouble walking.        SECONDARY DISCHARGE DIAGNOSES  Diagnosis: Elbow fracture, right  Assessment and Plan of Treatment: After your fall, you were found to have a comminuted right elbow fracture on imaging, and were taken to surgery by orthopedics who performed an open reduction and internal fixation. Your arm was placed in a cast and splint and your digits were normal. You will be continuing physical therapy upon discharge.  Please follow up with orthopedics in the clinic for a routine visit post-op.     PRINCIPAL DISCHARGE DIAGNOSIS  Diagnosis: Syncope  Assessment and Plan of Treatment: Please decrease your Blood pressure medication and take them as prescribed.  Syncope is when you temporarily lose consciousness, also called fainting or passing out. It is caused by a sudden decrease in blood flow to the brain. Even though most causes of syncope are not dangerous, syncope can possibly be a sign of a serious medical problem. Signs that you may be about to faint include feeling dizzy, lightheaded, nausea, visual changes, or cold/clammy skin.   You underwent a workup which ruled out a neurological and cardiac cause of syncope. Your head CT scan was normal and an echo of your heart showed an ejection fraction of 40% but no abnormalities. You had no arrhythmias on telemetry. You were found to have orthostatic hypotension and were started on alonzo stockings and an abdominal binder as well as a medication called midodrine which you should continue taking.  SEEK IMMEDIATE MEDICAL CARE IF YOU HAVE ANY OF THE FOLLOWING SYMPTOMS: severe headache, pain in your chest/abdomen/back, bleeding from your mouth or rectum, palpitations, shortness of breath, pain with breathing, seizure, confusion, or trouble walking.        SECONDARY DISCHARGE DIAGNOSES  Diagnosis: Elbow fracture, right  Assessment and Plan of Treatment: After your fall, you were found to have a comminuted right elbow fracture on imaging, and were taken to surgery by orthopedics who performed an open reduction and internal fixation. Your arm was placed in a cast and splint and your digits were normal. You will be continuing physical therapy upon discharge.  Please follow up with orthopedics in the clinic for a routine visit post-op.

## 2022-05-13 NOTE — DISCHARGE NOTE PROVIDER - CARE PROVIDER_API CALL
Lianne Mujica)  Medicine  Critical Care  [address] [place] [address]  Phone: [phone]  Fax: [phone]  Follow Up Time: 2 weeks    Charles Ford)  Orthopaedic Surgery  [address] [place] [id number]  Phone: [phone]  Fax: [phone]  Follow Up Time: 2 weeks    Jovi Combs)  Cardiovascular Disease; Internal Medicine  [address] [place] [address]  Phone: [phone]  Fax: [phone]  Follow Up Time: Routine

## 2022-05-13 NOTE — DISCHARGE NOTE PROVIDER - HOSPITAL COURSE
89 year old male with CAD s/p CABG, afib/SSS s/p Biv ICD medtronic,, HTN, CKD, and GERD who presented to the ED on 05/08 following an episode of a fall complicated by posterior head lacerations, found to have a large right Tempero-Parietal and small left parietal hematomas and found to have right comminuted fracture of the elbow, admitted for syncope w/u. Orthostatics positive. Midodrine, alonzo stockings and abdominal binder improved orthostatic hypotension. s/p ORIF for right elbow fx.    #Syncope most likely 2/2 orthostatic HoTN  #Large Right Frontoparietal and Small Left Parietal Hematoma-stable  - EP: s/p interrogation of Biv ICD medtronic on 05/08: functioning normally with no events  - Cardiology: TTE: EF 35-40%, G1DD, Increased LA size  - orthostatics positive, added alonzo stockings, added midodrine 2.5 q8; still positive; added abdominal binder  >>repeat orthostatics with abdominal binder were negative  - folate b12 TSH wnl    #Acute Right Olecranon Comminuted Fracture  -cardio clearance: see initial consult note  -Medical clearance see prior note  -s/p ORIF with ortho 5/11, no post-op complications on POD #2    #Elevated Troponin  #Likely NSTEMI II  #History of CAD s/p CABG  - Follows with Dr Combs  - Troponin 0.03/0.02 05/08  - ECG with no ischemic changes  - c/w asa  - Not on statin (patient and daughter not sure why)  - Lipid profile wnl, A1c 5.4%  - No events on Tele, d/lanny    #HO Atrial Fibrillation and SSS s/p Biv ICD (Medtronic)  #HO Prolonged QTc  - Not on AC due to high risk of falls   - c/w Amiodarone 100mg QD  - EKG 05/08   - TTE: EF 35-40%, G1DD, Increased LA size  - s/p interrogation of Biv ICD medtronic on 05/08: functioning normally with no event  - Avoid QTc prolonging agents  - Keep off AC  - Keep K>4 and Mg>2    #Macrocytic Anemia  - Baseline Hb 10.2 06/21/2021  - ED Hb 9.9, .7 - Hb stable  - Active T/S, Hb > 8  - iron studies anemia of chronic dz; tsh, b12 folate wnl  - Resumed home B12 supplements 100mcg QD    #CKD  - Baseline 1.7-1.9 2020  - ED BUN 23, Cr 1.5 -> 1.2  - Trend CMP and Phos  - avoid nephrotoxic agents     #HTN  - BP ok  - c/w home meds    89 year old male with CAD s/p CABG, afib/SSS s/p Biv ICD medtronic,, HTN, CKD, and GERD who presented to the ED on 05/08 following an episode of a fall complicated by posterior head lacerations, found to have a large right Tempero-Parietal and small left parietal hematomas and found to have right comminuted fracture of the elbow, admitted for syncope w/u. Orthostatics positive. Midodrine, alonzo stockings and abdominal binder improved orthostatic hypotension. s/p ORIF for right elbow fx.    #Syncope most likely 2/2 orthostatic HoTN  #Large Right Frontoparietal and Small Left Parietal Hematoma-stable  - EP: s/p interrogation of Biv ICD medtronic on 05/08: functioning normally with no events  - Cardiology: TTE: EF 35-40%, G1DD, Increased LA size  - orthostatics positive, added alonzo stockings, added midodrine 2.5 q8; still positive; added abdominal binder  >>repeat orthostatics with abdominal binder were negative  - folate b12 TSH wnl    #Acute Right Olecranon Comminuted Fracture  -cardio clearance: see initial consult note  -Medical clearance see prior note  -s/p ORIF with ortho 5/11, no post-op complications on POD #2    #Elevated Troponin  #Likely NSTEMI II  #History of CAD s/p CABG  - Follows with Dr Combs  - Troponin 0.03/0.02 05/08  - ECG with no ischemic changes  - c/w asa  - Not on statin (patient and daughter not sure why)  - Lipid profile wnl, A1c 5.4%  - No events on Tele, d/lanny    #HO Atrial Fibrillation and SSS s/p Biv ICD (Medtronic)  #HO Prolonged QTc  - Not on AC due to high risk of falls   - c/w Amiodarone 100mg QD  - EKG 05/08   - TTE: EF 35-40%, G1DD, Increased LA size  - s/p interrogation of Biv ICD medtronic on 05/08: functioning normally with no event  - Avoid QTc prolonging agents  - Keep off AC  - Keep K>4 and Mg>2    #Macrocytic Anemia  - Baseline Hb 10.2 06/21/2021  - ED Hb 9.9, .7 - Hb stable  - Active T/S, Hb > 8  - iron studies anemia of chronic dz; tsh, b12 folate wnl  - Resumed home B12 supplements 100mcg QD    #CKD  - Baseline 1.7-1.9 2020  - ED BUN 23, Cr 1.5 -> 1.2  - Trend CMP and Phos  - avoid nephrotoxic agents     #HTN  - BP ok  - c/w home meds       patient seen and examined at bedside this morning in the presence of daughter in law (RN by profession)  -stable for d/c planning to STR today   -spent over 35 mins; direct patient care HPI:  89 year old male with CAD s/p CABG, afib/SSS s/p Biv ICD medtronic,, HTN, CKD, and GERD who presented to the ED on 05/08 following an episode of a fall complicated by posterior head lacerations, found to have a large right Tempero-Parietal and small left parietal hematomas and found to have right comminuted fracture of the elbow, admitted for syncope w/u. Orthostatics positive. Midodrine, alonzo stockings and abdominal binder improved orthostatic hypotension. s/p ORIF for right elbow fx.      #Syncope most likely 2/2 orthostatic HoTN  #Large Right Frontoparietal and Small Left Parietal Hematoma-stable  - EP: s/p interrogation of Biv ICD medtronic on 05/08: functioning normally with no events  - Cardiology: TTE: EF 35-40%, G1DD, Increased LA size  - orthostatics positive, added alonzo stockings, added midodrine 2.5 q8; still positive; added abdominal binder  >>repeat orthostatics with abdominal binder were negative  - folate b12 TSH wnl    #Acute Right Olecranon Comminuted Fracture  -s/p ORIF with ortho 5/11, no post-op complications on POD #2    #Elevated Troponin  #Likely NSTEMI II  #History of CAD s/p CABG  - Follows with Dr Combs  - Troponin 0.03/0.02 05/08  - ECG with no ischemic changes  - c/w asa  - Not on statin (patient and daughter not sure why)  - Lipid profile wnl, A1c 5.4%  - No events on Tele    #HO Atrial Fibrillation and SSS s/p Biv ICD (Medtronic)  #HO Prolonged QTc  - Not on AC due to high risk of falls   - c/w Amiodarone 100mg QD  - EKG 05/08   - TTE: EF 35-40%, G1DD, Increased LA size  - s/p interrogation of Biv ICD medtronic on 05/08: functioning normally with no event  - Keep off AC    #Macrocytic Anemia  - Baseline Hb 10.2 06/21/2021  - ED Hb 9.9, .7 - Hb stable  - iron studies anemia of chronic dz; tsh, b12 folate wnl  - Resumed home B12 supplements 100mcg QD    #CKD- Stable  - Baseline 1.3-1.5    #HTN  #Orthostatic hypotension  - Will decrease lisinopril to 20mg QD and discontinue midodrine    Patient is medically stable and ready for discharge.     patient seen and examined at bedside this morning in the presence of daughter in law (RN by profession)  -stable for d/c planning to STR today   -spent over 35 mins; direct patient care

## 2022-05-13 NOTE — DISCHARGE NOTE PROVIDER - CARE PROVIDERS DIRECT ADDRESSES
,DirectAddress_Unknown,[email or web address]

## 2022-05-13 NOTE — DISCHARGE NOTE PROVIDER - PROVIDER TOKENS
PROVIDER:[TOKEN:[[id number]],FOLLOWUP:[2 weeks]],PROVIDER:[TOKEN:[[id number]],FOLLOWUP:[2 weeks]],PROVIDER:[TOKEN:[[id number]],FOLLOWUP:[Routine]]

## 2022-05-13 NOTE — DISCHARGE NOTE PROVIDER - ATTENDING DISCHARGE PHYSICAL EXAMINATION:
Vital Signs Last 24 Hrs  T(C): 36.3 (14 May 2022 12:36), Max: 37.4 (13 May 2022 19:57)  T(F): 97.4 (14 May 2022 12:36), Max: 99.3 (13 May 2022 19:57)  HR: 78 (14 May 2022 12:36) (71 - 78)  BP: 134/60 (14 May 2022 12:36) (120/61 - 150/70)  BP(mean): --  RR: 17 (14 May 2022 12:36) (17 - 18)    PHYSICAL EXAM:  GENERAL: NAD, speaking appropriately   HEAD:  Atraumatic, Normocephalic  EYES: EOMI, PERRLA, conjunctiva and sclera clear  NERVOUS SYSTEM:  Alert & Oriented X 3  CHEST/LUNG: Clear to percussion bilaterally; No rales, rhonchi, wheezing, or rubs  HEART: Regular rate and rhythm; No murmurs, rubs, or gallops  ABDOMEN: Soft, Nontender, Nondistended; Bowel sounds present  EXTREMITIES:  right arm sling/cast  SKIN: No rashes or lesions Vital Signs Last 24 Hrs  T(C): 36.8 (16 May 2022 05:34), Max: 36.8 (16 May 2022 05:34)  T(F): 98.3 (16 May 2022 05:34), Max: 98.3 (16 May 2022 05:34)  HR: 78 (16 May 2022 05:34) (69 - 78)  BP: 148/70 (16 May 2022 05:34) (131/62 - 153/68)  BP(mean): --  RR: 18 (16 May 2022 05:34) (18 - 18)  SpO2: --    PHYSICAL EXAM:  GENERAL: NAD, speaking appropriately   HEAD:  Atraumatic, Normocephalic  EYES: EOMI, PERRLA, conjunctiva and sclera clear  NERVOUS SYSTEM:  Alert & Oriented X 3  CHEST/LUNG: Clear to percussion bilaterally; No rales, rhonchi, wheezing, or rubs  HEART: Regular rate and rhythm; No murmurs, rubs, or gallops  ABDOMEN: Soft, Nontender, Nondistended; Bowel sounds present  EXTREMITIES:  right arm sling/cast  SKIN: No rashes or lesions Vital Signs Last 24 Hrs  T(C): 36.6 (17 May 2022 05:11), Max: 37.3 (16 May 2022 21:12)  T(F): 97.8 (17 May 2022 05:11), Max: 99.1 (16 May 2022 21:12)  HR: 72 (17 May 2022 05:11) (72 - 73)  BP: 141/66 (17 May 2022 05:11) (141/66 - 155/70)  RR: 18 (17 May 2022 05:11) (18 - 19)  SpO2: 97% (17 May 2022 07:42) (97% - 97%)    PHYSICAL EXAM:  GENERAL: NAD, speaking appropriately   HEAD:  Atraumatic, Normocephalic  EYES: EOMI, PERRLA, conjunctiva and sclera clear  NERVOUS SYSTEM:  Alert & Oriented X 3  CHEST/LUNG: Clear to percussion bilaterally; No rales, rhonchi, wheezing, or rubs  HEART: Regular rate and rhythm; No murmurs, rubs, or gallops  ABDOMEN: Soft, Nontender, Nondistended; Bowel sounds present  EXTREMITIES:  right arm sling/cast  SKIN: No rashes or lesions

## 2022-05-13 NOTE — DISCHARGE NOTE PROVIDER - NSDCMRMEDTOKEN_GEN_ALL_CORE_FT
amiodarone 100 mg oral tablet: 1 tab(s) orally once a day  Aspirin Low Dose 81 mg oral tablet, chewable: 1 tab(s) orally once a day  benazepril 40 mg oral tablet: 1 tab(s) orally once a day  metoprolol tartrate 25 mg oral tablet: 1 tab(s) orally 2 times a day  midodrine 2.5 mg oral tablet: 1 tab(s) orally 3 times a day  omeprazole 20 mg oral delayed release capsule: 1 cap(s) orally once a day  Vitamin B12 50 mcg oral tablet: 2 tab(s) orally once a day   amiodarone 100 mg oral tablet: 1 tab(s) orally once a day  Aspirin Low Dose 81 mg oral tablet, chewable: 1 tab(s) orally once a day  benazepril 20 mg oral tablet: 1 tab(s) orally once a day   metoprolol tartrate 25 mg oral tablet: 1 tab(s) orally 2 times a day  omeprazole 20 mg oral delayed release capsule: 1 cap(s) orally once a day  Vitamin B12 50 mcg oral tablet: 2 tab(s) orally once a day

## 2022-05-13 NOTE — DISCHARGE NOTE PROVIDER - NSDCFUSCHEDAPPT_GEN_ALL_CORE_FT
[facility]  Cardio [address]  Scheduled Appointment: 05/31/2022    Jovi Combs  [facility]  CARDIOLOGY [address]  Scheduled Appointment: 07/28/2022

## 2022-05-13 NOTE — PROGRESS NOTE ADULT - ASSESSMENT
89 year old male with CAD s/p CABG, afib/SSS s/p Biv ICD medtronic,, HTN, CKD, and GERD who presented to the ED on 05/08 following an episode of a fall complicated by posterior head lacerations, found to have a large right Tempero-Parietal and small left parietal hematomas and found to have right comminuted fracture of the elbow, admitted for syncope w/u. Orthostatics positive. Midodrine, alonzo stockings ordered pending repeat orthostatics. Going for OR tomorrow with orthopedics.    A/P:   Syncope and Head Trauma:   Syncope is likely due to orthostatic hypotension.   EKG showed ventricular paced rhythm.   ICD interrogation showed no events. 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 Large Right Frontoparietal scalp hematoma and Small Left Parietal scalp Hematoma.  Echo showed LVEF 35-40%, grade I diastolic dysfunction, severe LAE, mild AR, mild to mod MR, mild TR.   Started on Midodrine, ( Idon't think he will need it), discontinue Amlodipine, Labetalol switched to Metoprolol hopefully will reduce the orthostatic changes.   Continue Compression socks and abdominal binder, orthostatic improved.    ·	Acute Right Olecranon Comminuted Fracture  -POD #1 - S/P ORIF LEFT OLECRANON   -Ortho follow up appreciated   -pain control prn   -rehab/pt/OT as tolerated     ·	Chronic HFrEF:   Stable, euvolemic on exam.   Echo as above.   On Labetalol and Lisinopril, patient will benefit from Metoprolol for CHF, switch to Metoprolol 25mg BID  Hold Amlodipine 2.5mg daily (small dose with negative inotropic effect).    ·	CAD s/p CABG  mild troponin elevation after the fall, insignificant and stable.   Continue ASA, Metoprolol,     ·	Chronic Atrial Fibrillation and SSS s/p Biv ICD (Medtronic)  Not on AC due to high risk of falls   Continue Metoprolol and  Amiodarone 100mg QD    ·	Macrocytic Anemia  Baseline Hb 10.2 06/21/2021  Hb is trending down, possibly from hematoma.   Iron studies compatible with chronic disease. B12 389.     ·	CKD Stage III   -Cr 1.2; this am     DVT Prophylaxis: SCDs  Code Status: Full       # Progress Note Handoff  PENDING as follows  consults: CM for d/c planning and Physiatry consult for 4A evaluation (pending)  Family discussion: discussed with patient; comprehends his medical care - agreeable for d/c planning; aware of the possibility of STR vs. 4A option; wants to discuss with CM   Disposition: Pending dispo to rehab facility and or inpatient rehab - medically stable for d/c planning     Attending Physician Dr. Aminata Styles # [phone]

## 2022-05-14 NOTE — PROGRESS NOTE ADULT - SUBJECTIVE AND OBJECTIVE BOX
IDRIS BACON  89y  Male      Patient is a 89y old  Male who presents with a chief complaint of Syncope (11 May 2022 11:39)      INTERVAL HPI/OVERNIGHT EVENTS:  -pt seen and examined at bedside this morning   -d/c planning to STR   -CM assistance with discharge planning     Vital Signs Last 24 Hrs  T(C): 36.3 (14 May 2022 12:36), Max: 37.4 (13 May 2022 19:57)  T(F): 97.4 (14 May 2022 12:36), Max: 99.3 (13 May 2022 19:57)  HR: 78 (14 May 2022 12:36) (71 - 78)  BP: 134/60 (14 May 2022 12:36) (120/61 - 150/70)  RR: 17 (14 May 2022 12:36) (17 - 18)    05-10-22 @ 07:01  -  05-11-22 @ 07:00  --------------------------------------------------------  IN: 1030 mL / OUT: 300 mL / NET: 730 mL    05-11-22 @ 07:01  -  05-11-22 @ 15:09  --------------------------------------------------------  IN: 0 mL / OUT: 180 mL / NET: -180 mL    PHYSICAL EXAM:  GENERAL: NAD, speaking in full sentences   HEAD:  Atraumatic, Normocephalic  EYES: EOMI, PERRLA, conjunctiva and sclera clear  NERVOUS SYSTEM:  Alert & Oriented X 3  CHEST/LUNG: Clear to percussion bilaterally; No rales, rhonchi, wheezing, or rubs  HEART: Regular rate and rhythm; No murmurs, rubs, or gallops  ABDOMEN: Soft, Nontender, Nondistended; Bowel sounds present  EXTREMITIES:  Right arm sling/cast in place   SKIN: chronic skin changes LE     Consultant(s) Notes Reviewed:  [x ] YES  [ ] NO        LABS                                 8.8    8.35  )-----------( 262      ( 14 May 2022 05:38 )             25.8   05-14    140  |  105  |  32<H>  ----------------------------<  90  4.8   |  25  |  1.3    Ca    8.3<L>      14 May 2022 05:38  Mg     2.2     05-14    TPro  6.5  /  Alb  2.8<L>  /  TBili  1.0  /  DBili  x   /  AST  17  /  ALT  7   /  AlkPhos  52  05-14    CAPILLARY BLOOD GLUCOSE      RADIOLOGY & ADDITIONAL TESTS:    Imaging Personally visualized and Reviewed:  [ y ] YES  [ ] NO    MEDICATIONS  (STANDING):  aMIOdarone   Oral Tab/Cap - Peds 100 milliGRAM(s) Oral daily  aspirin  chewable 81 milliGRAM(s) Oral daily  chlorhexidine 4% Liquid 1 Application(s) Topical <User Schedule>  cyanocobalamin 1000 MICROGram(s) Oral daily  heparin   Injectable 5000 Unit(s) SubCutaneous every 12 hours  lactulose Syrup 20 Gram(s) Oral every 2 hours  lisinopril 40 milliGRAM(s) Oral daily  metoprolol tartrate 25 milliGRAM(s) Oral two times a day  midodrine. 2.5 milliGRAM(s) Oral three times a day  pantoprazole    Tablet 40 milliGRAM(s) Oral before breakfast  polyethylene glycol 3350 Oral Powder - Peds 17 Gram(s) Oral two times a day  senna 2 Tablet(s) Oral at bedtime    MEDICATIONS  (PRN):  acetaminophen     Tablet .. 650 milliGRAM(s) Oral every 6 hours PRN Mild Pain (1 - 3)

## 2022-05-14 NOTE — PROGRESS NOTE ADULT - ASSESSMENT
89 year old male with CAD s/p CABG, afib/SSS s/p Biv ICD medtronic,, HTN, CKD, and GERD who presented to the ED on 05/08 following an episode of a fall complicated by posterior head lacerations, found to have a large right Tempero-Parietal and small left parietal hematomas and found to have right comminuted fracture of the elbow, admitted for syncope w/u. Orthostatics positive. Midodrine, alonzo stockings ordered pending repeat orthostatics. Going for OR tomorrow with orthopedics.    A/P:   Syncope and Head Trauma:   Syncope is likely due to orthostatic hypotension.   EKG showed ventricular paced rhythm.   ICD interrogation showed no events. 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 Large Right Frontoparietal scalp hematoma and Small Left Parietal scalp Hematoma.  Echo showed LVEF 35-40%, grade I diastolic dysfunction, severe LAE, mild AR, mild to mod MR, mild TR.   Started on Midodrine, ( Idon't think he will need it), discontinue Amlodipine, Labetalol switched to Metoprolol hopefully will reduce the orthostatic changes.   Continue Compression socks and abdominal binder, orthostatic improved.    ·	Acute Right Olecranon Comminuted Fracture  -POD #1 - S/P ORIF LEFT OLECRANON   -Ortho follow up appreciated   -pain control prn   -rehab/pt/OT as tolerated     ·	Chronic HFrEF:   Stable, euvolemic on exam.   Echo as above.   On Labetalol and Lisinopril, patient will benefit from Metoprolol for CHF, switch to Metoprolol 25mg BID  Hold Amlodipine 2.5mg daily (small dose with negative inotropic effect).    ·	CAD s/p CABG  mild troponin elevation after the fall, insignificant and stable.   Continue ASA, Metoprolol,     ·	Chronic Atrial Fibrillation and SSS s/p Biv ICD (Medtronic)  Not on AC due to high risk of falls   Continue Metoprolol and  Amiodarone 100mg QD    ·	Macrocytic Anemia  Baseline Hb 10.2 06/21/2021  Hb is trending down, possibly from hematoma.   Iron studies compatible with chronic disease. B12 389.     ·	CKD Stage III   -Cr 1.2; this am     DVT Prophylaxis: SCDs  Code Status: Full       # Progress Note Handoff  PENDING as follows  consults: CM for d/c planning   Family discussion: discussed with patient; comprehends his medical care - agreeable for d/c planning; aware of the possibility of STR vs. 4A option; wants to discuss with CM   Disposition: Pending dispo to rehab facility - discussed with CM in rounds     Attending Physician Dr. Aminata Styles # [phone]

## 2022-05-15 NOTE — PROGRESS NOTE ADULT - ASSESSMENT
89 year old male with CAD s/p CABG, afib/SSS s/p Biv ICD medtronic,, HTN, CKD, and GERD who presented to the ED on 05/08 following an episode of a fall complicated by posterior head lacerations, found to have a large right Tempero-Parietal and small left parietal hematomas and found to have right comminuted fracture of the elbow, admitted for syncope w/u. Orthostatics positive. Midodrine, alonzo stockings ordered pending repeat orthostatics. Going for OR tomorrow with orthopedics.    A/P:   Syncope and Head Trauma:   Syncope is likely due to orthostatic hypotension.   EKG showed ventricular paced rhythm.   ICD interrogation showed no events. 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 Large Right Frontoparietal scalp hematoma and Small Left Parietal scalp Hematoma.  Echo showed LVEF 35-40%, grade I diastolic dysfunction, severe LAE, mild AR, mild to mod MR, mild TR.   Started on Midodrine, ( Idon't think he will need it), discontinue Amlodipine, Labetalol switched to Metoprolol hopefully will reduce the orthostatic changes.   Continue Compression socks and abdominal binder, orthostatic improved.    ·	Acute Right Olecranon Comminuted Fracture  -POD #1 - S/P ORIF LEFT OLECRANON   -Ortho follow up appreciated   -pain control prn   -rehab/pt/OT as tolerated     ·	Chronic HFrEF:   Stable, euvolemic on exam.   Echo as above.   On Labetalol and Lisinopril, patient will benefit from Metoprolol for CHF, switch to Metoprolol 25mg BID  Hold Amlodipine 2.5mg daily (small dose with negative inotropic effect).    ·	CAD s/p CABG  mild troponin elevation after the fall, insignificant and stable.   Continue ASA, Metoprolol,     ·	Chronic Atrial Fibrillation and SSS s/p Biv ICD (Medtronic)  Not on AC due to high risk of falls   Continue Metoprolol and  Amiodarone 100mg QD    ·	Macrocytic Anemia  Baseline Hb 10.2 06/21/2021  Hb is trending down, possibly from hematoma.   Iron studies compatible with chronic disease. B12 389.     ·	CKD Stage III   -Cr 1.2; this am     DVT Prophylaxis: SCDs  Code Status: Full       # Progress Note Handoff  PENDING as follows  consults: CM for d/c planning   Family discussion: discussed with patient; comprehends his medical care - agreeable for d/c planning; aware of the possibility of STR vs. 4A option; wants to discuss with CM   Disposition: Pending dispo to rehab facility - discussed with CM in rounds - anticipated for d/c tomorrow; COVID swab ordered    Attending Physician Dr. Aminata Styles # [phone]

## 2022-05-15 NOTE — PROGRESS NOTE ADULT - SUBJECTIVE AND OBJECTIVE BOX
IDRIS BACON 89y Male  MRN#: [id number]   CODE STATUS:________    Hospital Day: 7d    Patient is currently admitted with the primary diagnosis of syncope    SUBJECTIVE:  No events overnight. No new complaints                                            ----------------------------------------------------------  OBJECTIVE:  PAST MEDICAL & SURGICAL HISTORY:  Heart disease  Hypertension  Skin cancer    History of open heart surgery    FHx: skin cancer                                              -----------------------------------------------------------  ALLERGIES:  No Known Allergies                                            ------------------------------------------------------------    HOME MEDICATIONS:  Home Medications:  amiodarone 100 mg oral tablet: 1 tab(s) orally once a day (04 Aug 2021 07:22)  Aspirin Low Dose 81 mg oral tablet, chewable: 1 tab(s) orally once a day (04 Aug 2021 07:22)  benazepril 40 mg oral tablet: 1 tab(s) orally once a day (04 Aug 2021 07:22)  omeprazole 20 mg oral delayed release capsule: 1 cap(s) orally once a day (04 Aug 2021 07:22)  Vitamin B12 50 mcg oral tablet: 2 tab(s) orally once a day (08 May 2022 21:54)                           MEDICATIONS:  STANDING MEDICATIONS  aMIOdarone   Oral Tab/Cap - Peds 100 milliGRAM(s) Oral daily  aspirin  chewable 81 milliGRAM(s) Oral daily  chlorhexidine 4% Liquid 1 Application(s) Topical <User Schedule>  cyanocobalamin 1000 MICROGram(s) Oral daily  heparin   Injectable 5000 Unit(s) SubCutaneous every 12 hours  lisinopril 40 milliGRAM(s) Oral daily  metoprolol tartrate 25 milliGRAM(s) Oral two times a day  midodrine. 2.5 milliGRAM(s) Oral three times a day  pantoprazole    Tablet 40 milliGRAM(s) Oral before breakfast  polyethylene glycol 3350 Oral Powder - Peds 17 Gram(s) Oral two times a day  senna 2 Tablet(s) Oral at bedtime    PRN MEDICATIONS  acetaminophen     Tablet .. 650 milliGRAM(s) Oral every 6 hours PRN                                            ------------------------------------------------------------  VITAL SIGNS: Last 24 Hours  T(C): 36.9 (14 May 2022 21:26), Max: 36.9 (14 May 2022 05:23)  T(F): 98.5 (14 May 2022 21:26), Max: 98.5 (14 May 2022 05:23)  HR: 70 (14 May 2022 21:26) (70 - 78)  BP: 143/72 (14 May 2022 21:26) (134/60 - 150/70)  BP(mean): --  RR: 18 (14 May 2022 21:26) (17 - 18)  SpO2: --      05-14-22 @ 07:01  -  05-15-22 @ 01:02  --------------------------------------------------------  IN: 0 mL / OUT: 300 mL / NET: -300 mL                                             --------------------------------------------------------------  LABS:                        8.8    8.35  )-----------( 262      ( 14 May 2022 05:38 )             25.8     05-14    140  |  105  |  32<H>  ----------------------------<  90  4.8   |  25  |  1.3    Ca    8.3<L>      14 May 2022 05:38  Mg     2.2     05-14    TPro  6.5  /  Alb  2.8<L>  /  TBili  1.0  /  DBili  x   /  AST  17  /  ALT  7   /  AlkPhos  52  05-14                                            --------------------------------------------------------------    PHYSICAL EXAM:  General: NAD, AOx3  HEENT: Normocephalic, atraumatic, post scalp laceration sp repair, ?Xanthelasmata  LUNGS: Normal breath sounds, no wheezes/crackles  HEART: S1s2, no mrg  ABDOMEN: Soft, NT/ND. No rigidity/guarding  EXT: Rt arm in elbow cast s/p ORIF, hand distally wnl  NEURO: generalized weakness, Rt arm in cast  SKIN: Scattered mild ecchymoses

## 2022-05-15 NOTE — PROGRESS NOTE ADULT - SUBJECTIVE AND OBJECTIVE BOX
IDRIS BACON  89y  Male      Patient is a 89y old  Male who presents with a chief complaint of Syncope (11 May 2022 11:39)      INTERVAL HPI/OVERNIGHT EVENTS:  -pt seen and examined at bedside this morning   -d/c planning to STR   -CM assistance with discharge planning - likely Monday     Vital Signs Last 24 Hrs  T(C): 36.5 (15 May 2022 13:06), Max: 36.9 (14 May 2022 21:26)  T(F): 97.7 (15 May 2022 13:06), Max: 98.5 (14 May 2022 21:26)  HR: 71 (15 May 2022 13:06) (69 - 73)  BP: 131/62 (15 May 2022 13:06) (131/62 - 154/70)  RR: 18 (15 May 2022 13:06) (18 - 18)    PHYSICAL EXAM:  GENERAL: NAD, speaking in full sentences   HEAD:  Atraumatic, Normocephalic  EYES: EOMI, PERRLA, conjunctiva and sclera clear  NERVOUS SYSTEM:  Alert & Oriented X 3  CHEST/LUNG: Clear to percussion bilaterally; No rales, rhonchi, wheezing, or rubs  HEART: Regular rate and rhythm; No murmurs, rubs, or gallops  ABDOMEN: Soft, Nontender, Nondistended; Bowel sounds present  EXTREMITIES:  Right arm sling/cast in place   SKIN: chronic skin changes LE     Consultant(s) Notes Reviewed:  [x ] YES  [ ] NO        LABS                                 8.8    8.35  )-----------( 262      ( 14 May 2022 05:38 )             25.8   05-14    140  |  105  |  32<H>  ----------------------------<  90  4.8   |  25  |  1.3    Ca    8.3<L>      14 May 2022 05:38  Mg     2.2     05-14    TPro  6.5  /  Alb  2.8<L>  /  TBili  1.0  /  DBili  x   /  AST  17  /  ALT  7   /  AlkPhos  52  05-14    CAPILLARY BLOOD GLUCOSE      RADIOLOGY & ADDITIONAL TESTS:    Imaging Personally visualized and Reviewed:  [ y ] YES  [ ] NO    MEDICATIONS  (STANDING):  aMIOdarone   Oral Tab/Cap - Peds 100 milliGRAM(s) Oral daily  aspirin  chewable 81 milliGRAM(s) Oral daily  chlorhexidine 4% Liquid 1 Application(s) Topical <User Schedule>  cyanocobalamin 1000 MICROGram(s) Oral daily  heparin   Injectable 5000 Unit(s) SubCutaneous every 12 hours  lactulose Syrup 20 Gram(s) Oral every 2 hours  lisinopril 40 milliGRAM(s) Oral daily  metoprolol tartrate 25 milliGRAM(s) Oral two times a day  midodrine. 2.5 milliGRAM(s) Oral three times a day  pantoprazole    Tablet 40 milliGRAM(s) Oral before breakfast  polyethylene glycol 3350 Oral Powder - Peds 17 Gram(s) Oral two times a day  senna 2 Tablet(s) Oral at bedtime    MEDICATIONS  (PRN):  acetaminophen     Tablet .. 650 milliGRAM(s) Oral every 6 hours PRN Mild Pain (1 - 3)

## 2022-05-15 NOTE — PROGRESS NOTE ADULT - ASSESSMENT
89 year old male with CAD s/p CABG, afib/SSS s/p Biv ICD medtronic,, HTN, CKD, and GERD who presented to the ED on 05/08 following an episode of a fall complicated by posterior head lacerations, found to have a large right Tempero-Parietal and small left parietal hematomas and found to have right comminuted fracture of the elbow, admitted for syncope w/u. Orthostatics positive. Midodrine, alonzo stockings ordered pending repeat orthostatics. Going for OR tomorrow with orthopedics.    #Syncope most likely 2/2 orthostatic HoTN  #Large Right Frontoparietal and Small Left Parietal Hematoma-stable  - EP: s/p interrogation of Biv ICD medtronic on 05/08: functioning normally with no events  - Cardiology: TTE: EF 35-40%, G1DD, Increased LA size  - orthostatics positive, added alonzo stockings, added midodrine 2.5 q8; still positive; added abdominal binder  >>repeat orthostatics with abdominal binder were negative  - folate b12 TSH wnl    #Acute Right Olecranon Comminuted Fracture  -cardio clearance: see initial consult note  -Medical clearance see prior note  -s/p ORIF with ortho 5/11, no post-op complications on POD #2    #Elevated Troponin  #Likely NSTEMI II  #History of CAD s/p CABG  - Follows with Dr Combs  - Troponin 0.03/0.02 05/08  - ECG with no ischemic changes  - c/w asa  - Not on statin (patient and daughter not sure why)  - Lipid profile wnl, A1c 5.4%  - No events on Tele, d/lanny    #HO Atrial Fibrillation and SSS s/p Biv ICD (Medtronic)  #HO Prolonged QTc  - Not on AC due to high risk of falls   - c/w Amiodarone 100mg QD  - EKG 05/08   - TTE: EF 35-40%, G1DD, Increased LA size  - s/p interrogation of Biv ICD medtronic on 05/08: functioning normally with no event  - Avoid QTc prolonging agents  - Keep off AC  - Keep K>4 and Mg>2    #Macrocytic Anemia  - Baseline Hb 10.2 06/21/2021  - ED Hb 9.9, .7 - Hb stable  - Active T/S, Hb > 8  - iron studies anemia of chronic dz; tsh, b12 folate wnl  - Resumed home B12 supplements 100mcg QD    #CKD  - Baseline 1.7-1.9 2020  - ED BUN 23, Cr 1.5 -> 1.2  - Trend CMP and Phos  - avoid nephrotoxic agents     #HTN  - BP ok  - c/w home meds     #Handoff: Pt here s/p fall found to have orthostatic hypotension. Fall complicated by comminuted Rt elbow fx s/p ORIF with ortho 5/11. Medically stable for d/c    Others  - DVT Prophylaxis: SCDs  - GI Prophylaxis: Pantoprazole 40mg PO QD  - Diet: DASH/TLC  - Code Status: Full  - Dispo: NH vs home w/home PT vs 4A; pending PT/Physiatry eval. Medically stable 89 year old male with CAD s/p CABG, afib/SSS s/p Biv ICD medtronic,, HTN, CKD, and GERD who presented to the ED on 05/08 following an episode of a fall complicated by posterior head lacerations, found to have a large right Tempero-Parietal and small left parietal hematomas and found to have right comminuted fracture of the elbow, admitted for syncope w/u. Orthostatics positive. Midodrine, alonzo stockings ordered pending repeat orthostatics. Going for OR tomorrow with orthopedics.    #Syncope most likely 2/2 orthostatic HoTN  #Large Right Frontoparietal and Small Left Parietal Hematoma-stable  - EP: s/p interrogation of Biv ICD medtronic on 05/08: functioning normally with no events  - Cardiology: TTE: EF 35-40%, G1DD, Increased LA size  - orthostatics positive, added alonzo stockings, added midodrine 2.5 q8; still positive; added abdominal binder  >>repeat orthostatics with abdominal binder were negative  - folate b12 TSH wnl    #Acute Right Olecranon Comminuted Fracture  -cardio clearance: see initial consult note  -Medical clearance see prior note  -s/p ORIF with ortho 5/11, no post-op complications on POD #2    #Elevated Troponin  #Likely NSTEMI II  #History of CAD s/p CABG  - Follows with Dr Combs  - Troponin 0.03/0.02 05/08  - ECG with no ischemic changes  - c/w asa  - Not on statin (patient and daughter not sure why)  - Lipid profile wnl, A1c 5.4%  - No events on Tele, d/lanny    #HO Atrial Fibrillation and SSS s/p Biv ICD (Medtronic)  #HO Prolonged QTc  - Not on AC due to high risk of falls   - c/w Amiodarone 100mg QD  - EKG 05/08   - TTE: EF 35-40%, G1DD, Increased LA size  - s/p interrogation of Biv ICD medtronic on 05/08: functioning normally with no event  - Avoid QTc prolonging agents  - Keep off AC  - Keep K>4 and Mg>2    #Macrocytic Anemia  - Baseline Hb 10.2 06/21/2021  - ED Hb 9.9, .7 - Hb stable  - Active T/S, Hb > 8  - iron studies anemia of chronic dz; tsh, b12 folate wnl  - Resumed home B12 supplements 100mcg QD    #CKD  - Baseline 1.7-1.9 2020  - ED BUN 23, Cr 1.5 -> 1.2  - Trend CMP and Phos  - avoid nephrotoxic agents     #HTN  - BP ok  - c/w home meds     #Handoff: Pt here s/p fall found to have orthostatic hypotension. Fall complicated by comminuted Rt elbow fx s/p ORIF with ortho 5/11. Medically stable for d/c    Others  - DVT Prophylaxis: Heparin  - GI Prophylaxis: Pantoprazole 40mg PO QD  - Diet: DASH/TLC  - Code Status: Full  - Dispo: medically stable for d/c to STR

## 2022-05-16 NOTE — OCCUPATIONAL THERAPY INITIAL EVALUATION ADULT - OCCUPATION
HISTORY OF PRESENT ILLNESS  Edward Dong is a 68 y.o. male. HPI  Problem follow up:  Edward Dong returns for follow up visit regarding low back pain radiating to left leg, foot. he was seen 3 weeks ago in office diagnosed with sciatica and treated with PT, diclofenac and later tramadol. Workup was significant for xray:  CLINICAL HISTORY: Low back pain     INDICATION: Low back pain  FINDINGS:     Three views of the lumbar spine are obtained.     Loss of disc height throughout the lumbar spine. Minimal anterolisthesis of L4  on L5. Severe facet degenerative change at L4-5 and L5-S1 which is greater on  the right. Visualized osseous structures are without evidence of  fracture-dislocation.      There is no significant soft tissue abnormality identified.     IMPRESSION  IMPRESSION:  No acute fracture or dislocation.     Multilevel degenerative change. .  Notes, labs, studies, imaging related to this problem during prior visit were available . Since that visit, he has worsened. he has been compliant with prescribed treatment. Residual symptoms include: worsened low back and L leg pain with increasing temperature derangement in L leg foot, tingling but no numbness. Some sense of weakness in L leg now. All worse with extension of back, after PT  New issues associated with this problem include: none. ROS    Physical Exam  Current vital are:   Visit Vitals    /89 (BP 1 Location: Left arm, BP Patient Position: Sitting)    Pulse 68    Temp 99 °F (37.2 °C) (Oral)    Resp 18    Ht 5' 9\" (1.753 m)    Wt 164 lb 6 oz (74.6 kg)    SpO2 95%    BMI 24.27 kg/m2      Patient appears to be in moderate pain, antalgic gait noted. Lumbosacral spine area reveals moderate local tenderness. Painful LS ROM noted. Spinal alignment is normal.  Straight leg raise is negative at 80 degrees on both sides. Lumbosacral distribution DTR's, motor strength and sensation are normal, including heel and toe gait. Peripheral pulses are palpable and 2 plus. Prior studies inlcude: Lumbar spine X-Ray: shows DJD changes, likely chronic. MRI not available    ASSESSMENT and PLAN  Diagnoses and all orders for this visit:    1. Lumbosacral radiculopathy - mod to severe DJD on xray with increasing sensory radiculopathic symptoms. Check MRI Lumb spine, trial of steroid taper, stop PT, refer to ortho spine.   -     predniSONE (STERAPRED DS) 10 mg dose pack; See administration instruction per 10mg dose pack  -     MRI LUMB SPINE WO CONT; Future  -     [facility]      Follow-up Disposition: Not on File retired

## 2022-05-16 NOTE — OCCUPATIONAL THERAPY INITIAL EVALUATION ADULT - TRANSFER TRAINING, PT EVAL
In one week, pt will demonstrate sit to stand with min assist with use of appropriate AD.  In one week, pt will demonstrate stand to sit and bed/toilet with safety rail transfers with contact guard with appropriate AD.

## 2022-05-16 NOTE — PROGRESS NOTE ADULT - ASSESSMENT
89 year old male with CAD s/p CABG, afib/SSS s/p Biv ICD medtronic,, HTN, CKD, and GERD who presented to the ED on 05/08 following an episode of a fall complicated by posterior head lacerations, found to have a large right Tempero-Parietal and small left parietal hematomas and found to have right comminuted fracture of the elbow, admitted for syncope w/u. Orthostatics positive. Midodrine, alonzo stockings ordered pending repeat orthostatics. Going for OR tomorrow with orthopedics.    A/P:   Syncope and Head Trauma:   Syncope is likely due to orthostatic hypotension.   EKG showed ventricular paced rhythm.   ICD interrogation showed no events. 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 Large Right Frontoparietal scalp hematoma and Small Left Parietal scalp Hematoma.  Echo showed LVEF 35-40%, grade I diastolic dysfunction, severe LAE, mild AR, mild to mod MR, mild TR.   Started on Midodrine, ( Idon't think he will need it), discontinue Amlodipine, Labetalol switched to Metoprolol hopefully will reduce the orthostatic changes.   Continue Compression socks and abdominal binder, orthostatic improved.    ·	Acute Right Olecranon Comminuted Fracture  -S/P ORIF LEFT OLECRANON   -Ortho follow up appreciated   -pain control prn   -rehab/pt/OT as tolerated     ·	HTN/Chronic HFrEF:   Stable, euvolemic on exam.   -lasix 20mg and off of midodrine - will monitor vitals     ·	CAD s/p CABG  mild troponin elevation after the fall, insignificant and stable.   Continue ASA, Metoprolol,     ·	Chronic Atrial Fibrillation and SSS s/p Biv ICD (Medtronic)  Not on AC due to high risk of falls   Continue Metoprolol and  Amiodarone 100mg QD    ·	Macrocytic Anemia  Baseline Hb 10.2 06/21/2021  Hb is trending down, possibly from hematoma.   Iron studies compatible with chronic disease. B12 389.     ·	CKD Stage III   -stable     DVT Prophylaxis: SCDs  Code Status: Full       # Progress Note Handoff  PENDING as follows  consults: CM for d/c planning   Family discussion: discussed with patient; comprehends his medical care - agreeable for d/c planning; updated family at bedside   Disposition: medically stable for d/c planning anticipated for d/c 5/17/22     Attending Physician Dr. Aminata Styles # [phone]

## 2022-05-16 NOTE — PROGRESS NOTE ADULT - SUBJECTIVE AND OBJECTIVE BOX
SUBJECTIVE:  HPI:  History of Present Illness  Mr. Leslie is a 89 year old male patient known to have:  - Baseline AO3, lives alone, ambulates independently  - HTN  - CAD s/p CABG 25 years ago. Follows with Dr Combs  - History of atrial Fibrillation and SSS s/p Biv ICD Medtronic (non MR compatible). Not on AC due to high risk of falls per daughter  - CKD (baseline 1.7-1.9 2020)  - GERD  - Skin Cancer      He was brought to the ED on 05/08 after an episode of syncope/fall.  History goes back to this morning after breakfast when the patient was washing dishes when he felt light headed and fell backward to hit his head on the wooden floor.  Per patient, prior to fall, he only complained of light headedness and blurry vision with no CP, palpitations, diaphoresis, or blacking out.  During the fall, he had HT but denied LOC or seizure like activity.  After fall, he noted a laceration on back of head. He stood up and took some time before he was able to understand what happened.  He called his brother who lives next door who found a pool of blood on the floor.      On review of systems, patient denies any recent fever, chills, night sweats, URTI symptoms (cough, rhinorrhea, sore throat), urinary symptoms (urinary frequency, urgency, intermittence, dysuria, foul smelling urine, cloudy urine), change in bowel movements (diarrhea or constipation), abdominal pain, headache, nausea, or vomiting.   No sick contacts.   No recent travel or exposure to recent travelers.      Upon presentation to the ED, the patient was hemodynamically stable:  Vital Signs in ED   - /72mmHg  - HR 80 bpm  - RR 18bpm  - T 36.7  - SaO2 99%      Investigations   Laboratory Workup  - CBC:                        9.9    11.36 )-----------( 246      ( 08 May 2022 13:52 )             29.7     - Chemistry:  05-08    136  |  102  |  23<H>  ----------------------------<  113<H>  4.7   |  23  |  1.5    Ca    9.0      08 May 2022 13:52  Mg     2.3     05-08    TPro  7.9  /  Alb  3.6  /  TBili  0.8  /  DBili  x   /  AST  13  /  ALT  7   /  AlkPhos  61  05-08    - Cardiac Markers:  CARDIAC MARKERS ( 08 May 2022 18:19 )  x     / 0.02 ng/mL / x     / x     / x      CARDIAC MARKERS ( 08 May 2022 13:52 )  x     / 0.03 ng/mL / x     / x     / x          Radiological Workup  * CT Head No Cont (05.08.22 @ 14:27) CT HEAD No acute intracranial findings. Moderate chronic microvascular ischemic changes. Large right temporoparietal scalp hematoma and small left parietal scalp hematoma. No underlying calvarial fracture.  * CT CERVICAL SPINE: No acute cervical fracture or dislocation.  * Xray Pelvis AP only (05.08.22 @ 14:59) No fractures seen  * XR right elbow and forearm revealed comminuted right olecranon fracture    - Patient was evaluated by orthopedic team: s/p right posterior elbow splint and side struts  - He is s/p posterior head laceration repairs  - He was also evaluated by EP who interrogated device Biv ICD: functioning normally with no events  - He will be admitted for further syncope work up and surgical intervention for comminuted right olecranon fracture   (08 May 2022 21:28)      Patient is a 89y old Male who presents with a chief complaint of Syncope (15 May 2022 14:58)    Currently admitted to medicine with the primary diagnosis of Syncope       Today is hospital day 8d.     PAST MEDICAL & SURGICAL HISTORY  Heart disease    Hypertension    Skin cancer    History of open heart surgery    FHx: skin cancer        ALLERGIES:  No Known Allergies    MEDICATIONS:  ACTIVE MEDICATIONS  acetaminophen     Tablet .. 650 milliGRAM(s) Oral every 6 hours PRN  aMIOdarone   Oral Tab/Cap - Peds 100 milliGRAM(s) Oral daily  aspirin  chewable 81 milliGRAM(s) Oral daily  chlorhexidine 4% Liquid 1 Application(s) Topical <User Schedule>  cyanocobalamin 1000 MICROGram(s) Oral daily  heparin   Injectable 5000 Unit(s) SubCutaneous every 12 hours  metoprolol tartrate 25 milliGRAM(s) Oral two times a day  pantoprazole    Tablet 40 milliGRAM(s) Oral before breakfast  polyethylene glycol 3350 Oral Powder - Peds 17 Gram(s) Oral two times a day  senna 2 Tablet(s) Oral at bedtime      VITALS:   T(F): 98.3  HR: 78  BP: 148/70  RR: 18  SpO2: --    LABS:                        8.9    6.72  )-----------( 329      ( 16 May 2022 07:46 )             26.8     05-16    143  |  107  |  26<H>  ----------------------------<  103<H>  5.0   |  24  |  1.3    Ca    8.7      16 May 2022 07:46  Mg     2.3     05-16    TPro  6.7  /  Alb  2.8<L>  /  TBili  1.3<H>  /  DBili  x   /  AST  22  /  ALT  18  /  AlkPhos  68  05-16                      PHYSICAL EXAM:  General: NAD, AOx3  HEENT: Normocephalic, atraumatic, post scalp laceration sp repair, ?Xanthelasmata  LUNGS: Normal breath sounds, no wheezes/crackles  HEART: S1s2, no mrg  ABDOMEN: Soft, NT/ND. No rigidity/guarding  EXT: Rt arm in elbow cast s/p ORIF, hand distally wnl  NEURO: generalized weakness, Rt arm in cast  SKIN: Scattered mild ecchymoses    Assessment and Plan:   · Assessment	  89 year old male with CAD s/p CABG, afib/SSS s/p Biv ICD medtronic,, HTN, CKD, and GERD who presented to the ED on 05/08 following an episode of a fall complicated by posterior head lacerations, found to have a large right Tempero-Parietal and small left parietal hematomas and found to have right comminuted fracture of the elbow, admitted for syncope w/u. Orthostatics positive. Midodrine, alonzo stockings ordered pending repeat orthostatics. Going for OR tomorrow with orthopedics.    #Syncope most likely 2/2 orthostatic HoTN  #Large Right Frontoparietal and Small Left Parietal Hematoma-stable  - EP: s/p interrogation of Biv ICD medtronic on 05/08: functioning normally with no events  - Cardiology: TTE: EF 35-40%, G1DD, Increased LA size  - orthostatics positive, added alonzo stockings, s/p midodrine 2.5 q8; still positive; added abdominal binder  >>repeat orthostatics with abdominal binder were negative  - folate b12 TSH wnl    #Acute Right Olecranon Comminuted Fracture  -cardio clearance: see initial consult note  -Medical clearance see prior note  -s/p ORIF with ortho 5/11, no post-op complications on POD #2    #Elevated Troponin  #Likely NSTEMI II  #History of CAD s/p CABG  - Follows with Dr Combs  - Troponin 0.03/0.02 05/08  - ECG with no ischemic changes  - c/w asa  - Not on statin (patient and daughter not sure why)  - Lipid profile wnl, A1c 5.4%  - No events on Tele, d/lanny    #HO Atrial Fibrillation and SSS s/p Biv ICD (Medtronic)  #HO Prolonged QTc  - Not on AC due to high risk of falls   - c/w Amiodarone 100mg QD  - EKG 05/08   - TTE: EF 35-40%, G1DD, Increased LA size  - s/p interrogation of Biv ICD medtronic on 05/08: functioning normally with no event  - Avoid QTc prolonging agents  - Keep off AC  - Keep K>4 and Mg>2    #Macrocytic Anemia  - Baseline Hb 10.2 06/21/2021  - ED Hb 9.9, .7 - Hb stable  - Active T/S, Hb > 8  - iron studies anemia of chronic dz; tsh, b12 folate wnl  - Resumed home B12 supplements 100mcg QD    #CKD  - Baseline 1.7-1.9 2020  - ED BUN 23, Cr 1.5 -> 1.2  - Trend CMP and Phos  - avoid nephrotoxic agents     #HTN  - was on lisinopril 40mg and midodrine 2.5mg TID, now c/w Lisinopril 20mg QD    #Handoff: Medically stable for d/c. D/lanny midodrine and reduced lisinopril from 40 to 20mg QD on 5/16, f/u BP.    Others  - DVT Prophylaxis: Heparin  - GI Prophylaxis: Pantoprazole 40mg PO QD  - Diet: DASH/TLC  - Code Status: Full  - Dispo: medically stable for d/c to STR

## 2022-05-16 NOTE — OCCUPATIONAL THERAPY INITIAL EVALUATION ADULT - PERTINENT HX OF CURRENT PROBLEM, REHAB EVAL
89 year old male with CAD s/p CABG, afib/SSS s/p Biv ICD medtronic,, HTN, CKD, and GERD who presented to the ED on 05/08 following an episode of a fall complicated by posterior head lacerations, found to have a large right Tempero-Parietal and small left parietal hematomas and found to have right comminuted fracture of the elbow, admitted for syncope w/u. Orthostatics positive. Midodrine, alonzo stockings ordered pending repeat orthostatics.

## 2022-05-16 NOTE — OCCUPATIONAL THERAPY INITIAL EVALUATION ADULT - GENERAL OBSERVATIONS, REHAB EVAL
Pt received and left semifowler in bed. + IV lock, + cast with ace wrap R UE. Daughter present for most of session.

## 2022-05-16 NOTE — PROGRESS NOTE ADULT - SUBJECTIVE AND OBJECTIVE BOX
IDRIS BACON  89y  Male      Patient is a 89y old  Male who presents with a chief complaint of Syncope (11 May 2022 11:39)      INTERVAL HPI/OVERNIGHT EVENTS:    -pt seen and examined at bedside this morning   -d/c planning to STR   -CM assistance with discharge planning - Tuesday as per MDR (patient is anticipated)    Vital Signs Last 24 Hrs  T(C): 36.8 (16 May 2022 05:34), Max: 36.8 (16 May 2022 05:34)  T(F): 98.3 (16 May 2022 05:34), Max: 98.3 (16 May 2022 05:34)  HR: 78 (16 May 2022 05:34) (69 - 78)  BP: 148/70 (16 May 2022 05:34) (142/69 - 153/68)  RR: 18 (16 May 2022 05:34) (18 - 18)    PHYSICAL EXAM:  GENERAL: NAD, speaking in full sentences   HEAD:  Atraumatic, Normocephalic  EYES: EOMI, PERRLA, conjunctiva and sclera clear  NERVOUS SYSTEM:  Alert & Oriented X 3  CHEST/LUNG: Clear to percussion bilaterally; No rales, rhonchi, wheezing, or rubs  HEART: Regular rate and rhythm; No murmurs, rubs, or gallops  ABDOMEN: Soft, Nontender, Nondistended; Bowel sounds present  EXTREMITIES:  Right arm sling/cast in place   SKIN: chronic skin changes LE     Consultant(s) Notes Reviewed:  [x ] YES  [ ] NO    LABS                                    8.9    6.72  )-----------( 329      ( 16 May 2022 07:46 )             26.8           05-16    143  |  107  |  26<H>  ----------------------------<  103<H>  5.0   |  24  |  1.3    Ca    8.7      16 May 2022 07:46  Mg     2.3     05-16    TPro  6.7  /  Alb  2.8<L>  /  TBili  1.3<H>  /  DBili  x   /  AST  22  /  ALT  18  /  AlkPhos  68  05-16      CAPILLARY BLOOD GLUCOSE      RADIOLOGY & ADDITIONAL TESTS:    Imaging Personally visualized and Reviewed:  [ y ] YES  [ ] NO    MEDICATIONS  (STANDING):  aMIOdarone   Oral Tab/Cap - Peds 100 milliGRAM(s) Oral daily  aspirin  chewable 81 milliGRAM(s) Oral daily  chlorhexidine 4% Liquid 1 Application(s) Topical <User Schedule>  cyanocobalamin 1000 MICROGram(s) Oral daily  heparin   Injectable 5000 Unit(s) SubCutaneous every 12 hours  metoprolol tartrate 25 milliGRAM(s) Oral two times a day  pantoprazole    Tablet 40 milliGRAM(s) Oral before breakfast  polyethylene glycol 3350 Oral Powder - Peds 17 Gram(s) Oral two times a day  senna 2 Tablet(s) Oral at bedtime    MEDICATIONS  (PRN):  acetaminophen     Tablet .. 650 milliGRAM(s) Oral every 6 hours PRN Mild Pain (1 - 3)

## 2022-05-16 NOTE — OCCUPATIONAL THERAPY INITIAL EVALUATION ADULT - RANGE OF MOTION EXAMINATION
R UE: shoulder flexion 0- 95*, digits WFL. Elbow and wrist not tested 2* cast./deficits as listed below

## 2022-05-16 NOTE — OCCUPATIONAL THERAPY INITIAL EVALUATION ADULT - ADDITIONAL COMMENTS
As per pt report, resides on 1 level in private house with mandatory steps to enter. Does not go to bedroom upstairs or basement. Full bath and bedroom on first floor. Meals on Wheels for supper. Hired help for housework.

## 2022-05-16 NOTE — OCCUPATIONAL THERAPY INITIAL EVALUATION ADULT - ADL RETRAINING, OT EVAL
In one week, pt will demonstrate UB dressing with min assist. In one week, pt will demonstrate LB dressing with max assist.

## 2022-05-16 NOTE — OCCUPATIONAL THERAPY INITIAL EVALUATION ADULT - IMPAIRED TRANSFERS: SIT/STAND, REHAB EVAL
Pt re-educated on NWB R UE and its application to sit<>stand. Pt demonstrated good understanding of above./impaired balance/decreased flexibility/decreased ROM/decreased strength

## 2022-05-17 NOTE — PROGRESS NOTE ADULT - SUBJECTIVE AND OBJECTIVE BOX
IDRIS BACON  89y  Male      Patient is a 89y old  Male who presents with a chief complaint of Syncope (11 May 2022 11:39)      INTERVAL HPI/OVERNIGHT EVENTS:    -pt seen and examined at bedside this morning   -d/c planning to STR today   -CM assistance on board for d/c planning     Vital Signs Last 24 Hrs  T(C): 36.6 (17 May 2022 05:11), Max: 37.3 (16 May 2022 21:12)  T(F): 97.8 (17 May 2022 05:11), Max: 99.1 (16 May 2022 21:12)  HR: 72 (17 May 2022 05:11) (72 - 73)  BP: 141/66 (17 May 2022 05:11) (141/66 - 155/70)  RR: 18 (17 May 2022 05:11) (18 - 19)  SpO2: 97% (17 May 2022 07:42) (97% - 97%)    PHYSICAL EXAM:  GENERAL: NAD, speaking in full sentences   HEAD:  Atraumatic, Normocephalic  EYES: EOMI, PERRLA, conjunctiva and sclera clear  NERVOUS SYSTEM:  Alert & Oriented X 3  CHEST/LUNG: Clear to percussion bilaterally; No rales, rhonchi, wheezing, or rubs  HEART: Regular rate and rhythm; No murmurs, rubs, or gallops  ABDOMEN: Soft, Nontender, Nondistended; Bowel sounds present  EXTREMITIES:  Right arm sling/cast in place   SKIN: chronic skin changes LE     Consultant(s) Notes Reviewed:  [x ] YES  [ ] NO    LABS               8.9    6.72  )-----------( 329      ( 16 May 2022 07:46 )             26.8           05-16    143  |  107  |  26<H>  ----------------------------<  103<H>  5.0   |  24  |  1.3    Ca    8.7      16 May 2022 07:46  Mg     2.3     05-16    TPro  6.7  /  Alb  2.8<L>  /  TBili  1.3<H>  /  DBili  x   /  AST  22  /  ALT  18  /  AlkPhos  68  05-16      CAPILLARY BLOOD GLUCOSE      RADIOLOGY & ADDITIONAL TESTS:    Imaging Personally visualized and Reviewed:  [ y ] YES  [ ] NO    MEDICATIONS  (STANDING):  aMIOdarone   Oral Tab/Cap - Peds 100 milliGRAM(s) Oral daily  aspirin  chewable 81 milliGRAM(s) Oral daily  chlorhexidine 4% Liquid 1 Application(s) Topical <User Schedule>  cyanocobalamin 1000 MICROGram(s) Oral daily  heparin   Injectable 5000 Unit(s) SubCutaneous every 12 hours  lisinopril 20 milliGRAM(s) Oral daily  metoprolol tartrate 25 milliGRAM(s) Oral two times a day  pantoprazole    Tablet 40 milliGRAM(s) Oral before breakfast  polyethylene glycol 3350 Oral Powder - Peds 17 Gram(s) Oral two times a day  senna 2 Tablet(s) Oral at bedtime    MEDICATIONS  (PRN):  acetaminophen     Tablet .. 650 milliGRAM(s) Oral every 6 hours PRN Mild Pain (1 - 3)

## 2022-05-17 NOTE — DISCHARGE NOTE NURSING/CASE MANAGEMENT/SOCIAL WORK - NSDCPEFALRISK_GEN_ALL_CORE
For information on Fall & Injury Prevention, visit: [email or web address] OR  [email or web address] OR  https://www.cdc.gov/steadi/patient.html

## 2022-05-17 NOTE — DISCHARGE NOTE NURSING/CASE MANAGEMENT/SOCIAL WORK - PATIENT PORTAL LINK FT
You can access the FollowMyHealth Patient Portal offered by [facility] by registering at the following website: [email or web address]. By joining [facility]’s FollowMyHealth portal, you will also be able to view your health information using other applications (apps) compatible with our system.

## 2022-05-17 NOTE — PROGRESS NOTE ADULT - ASSESSMENT
89 year old male with CAD s/p CABG, afib/SSS s/p Biv ICD medtronic,, HTN, CKD, and GERD who presented to the ED on 05/08 following an episode of a fall complicated by posterior head lacerations, found to have a large right Tempero-Parietal and small left parietal hematomas and found to have right comminuted fracture of the elbow, admitted for syncope w/u. Orthostatics positive. Midodrine, alonzo stockings ordered pending repeat orthostatics. Going for OR tomorrow with orthopedics.    A/P:   Syncope and Head Trauma:   Syncope is likely due to orthostatic hypotension.   EKG showed ventricular paced rhythm.   ICD interrogation showed no events. 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 Large Right Frontoparietal scalp hematoma and Small Left Parietal scalp Hematoma.  Echo showed LVEF 35-40%, grade I diastolic dysfunction, severe LAE, mild AR, mild to mod MR, mild TR.   Started on Midodrine, ( Idon't think he will need it), discontinue Amlodipine, Labetalol switched to Metoprolol hopefully will reduce the orthostatic changes.   Continue Compression socks and abdominal binder, orthostatic improved.    ·	Acute Right Olecranon Comminuted Fracture  -S/P ORIF LEFT OLECRANON   -Ortho follow up appreciated   -pain control prn   -rehab/pt/OT as tolerated     ·	HTN/Chronic HFrEF:   Stable, euvolemic on exam.   -lasix 20mg and off of midodrine - will monitor vitals     ·	CAD s/p CABG  mild troponin elevation after the fall, insignificant and stable.   Continue ASA, Metoprolol,     ·	Chronic Atrial Fibrillation and SSS s/p Biv ICD (Medtronic)  Not on AC due to high risk of falls   Continue Metoprolol and  Amiodarone 100mg QD    ·	Macrocytic Anemia  Baseline Hb 10.2 06/21/2021  Hb is trending down, possibly from hematoma.   Iron studies compatible with chronic disease. B12 389.     ·	CKD Stage III   -stable     DVT Prophylaxis: SCDs  Code Status: Full       # Progress Note Handoff  PENDING as follows  consults: CM for d/c planning   Family discussion: discussed with patient; comprehends his medical care - agreeable for d/c planning; updated family at bedside   Disposition: medically stable for d/c planning anticipated for d/c 5/17/22     Attending Physician Dr. Aminata Styles # [phone]

## 2022-05-17 NOTE — DISCHARGE NOTE NURSING/CASE MANAGEMENT/SOCIAL WORK - NSDCVIVACCINE_GEN_ALL_CORE_FT
Tdap; 08-May-2022 16:34; Moises Tristan); [employer]; [id number] (Exp. Date: 11-Mar-2024); IntraMuscular; Deltoid Right.; 0.5 milliLiter(s); VIS (VIS Published: 09-May-2013, VIS Presented: 08-May-2022);

## 2022-05-17 NOTE — PROGRESS NOTE ADULT - TIME BILLING
direct patient care and chart review   -coordinated current plan of care with medical staff on MDR
direct patient care  -coordinated current plan of care with medical staff on MDR
direct patient care and chart review   -coordinated current plan of care with medical staff on MDR
direct patient care and chart review   -coordinated current plan of care with medical staff on MDR   -d/c papers edited by me
#Progress Note Handoff  Pending (specify):  follow up cardiology, repeat orthostats, follow up interrogation  Family discussion: house staff updated pt family  Disposition: cardiac telemonitoring
#Progress Note Handoff  Pending (specify):  orthostats, ortho procedure, npo in evening   Family discussion: house staff updated pt family  Disposition: dc cardiac telemonitoring

## 2022-05-17 NOTE — PROGRESS NOTE ADULT - PROVIDER SPECIALTY LIST ADULT
Hospitalist
Hospitalist
Internal Medicine
Internal Medicine
Orthopedics
Orthopedics
Hospitalist
Internal Medicine
Hospitalist
Internal Medicine
Hospitalist
EMS

## 2022-06-25 NOTE — ED PROVIDER NOTE - NSFOLLOWUPCLINICS_GEN_ALL_ED_FT
[facility]  Neurology  [address] [place] [address], [facility] [address]  [place] [address]  Phone: [phone]  Fax:   Follow Up Time: 1-3 Days

## 2022-06-25 NOTE — ED PROVIDER NOTE - PATIENT PORTAL LINK FT
You can access the FollowMyHealth Patient Portal offered by [facility] by registering at the following website: [email or web address]. By joining [facility]’s FollowMyHealth portal, you will also be able to view your health information using other applications (apps) compatible with our system.

## 2022-06-25 NOTE — ED PROVIDER NOTE - CLINICAL SUMMARY MEDICAL DECISION MAKING FREE TEXT BOX
89 male here for confusion by daughter. Had screening labs imaging supportive care medications and reevaluation, no acute emergent findings, symptoms improved, plan discussed with patient, will discharge with outpatient management and return and follow up instructions. Had isolated hemolyzed potassium, normal GFR, no EKG changes, likely lab error and unrelated to current symptom complex. Reassured family of screening test results and provided return instructions.

## 2022-06-25 NOTE — ED PROVIDER NOTE - OBJECTIVE STATEMENT
89 year old male with CAD s/p CABG, afib/SSS s/p Biv ICD medtronic,, HTN, CKD, and GERD Presenting to the ED with daughter for evaluation of confusion x3 weeks, increasing over the past 3 days.  Patient tested positive for COVID-19 3 days ago, received monoclonal antibody infusion 2 days ago, patient with improvement of COVID symptoms.  Patient no longer experiencing cough, runny nose, fever.  At this time patient is asymptomatic only endorsing increasing intermittent episodes of confusion.  Denies chest pain, shortness of breath, numbness/tingling, weakness, nausea, vomiting, abdominal pain.

## 2022-06-25 NOTE — ED ADULT NURSE NOTE - NSIMPLEMENTINTERV_GEN_ALL_ED
Implemented All Fall with Harm Risk Interventions:  [place] to call system. Call bell, personal items and telephone within reach. Instruct patient to call for assistance. Room bathroom lighting operational. Non-slip footwear when patient is off stretcher. Physically safe environment: no spills, clutter or unnecessary equipment. Stretcher in lowest position, wheels locked, appropriate side rails in place. Provide visual cue, wrist band, yellow gown, etc. Monitor gait and stability. Monitor for mental status changes and reorient to person, place, and time. Review medications for side effects contributing to fall risk. Reinforce activity limits and safety measures with patient and family. Provide visual clues: red socks.

## 2022-06-25 NOTE — ED PROVIDER NOTE - ATTENDING APP SHARED VISIT CONTRIBUTION OF CARE
I personally evaluated the patient. I reviewed the Resident’s or Physician Assistant’s note (as assigned above), and agree with the findings and plan except as documented in my note.     89 male here from the [facility] for revaluation of waxing / waning confusion / decreased mental acuity as per daughter who is here with him to assist HPI. Has recent workup for COVID with monoclonal AB infusion 2 days ago and improvement in symptoms. Daughter states since a recent hospitalization with discharge to SNF / care home he has been less sharp than normal. No focal deficits or complaints by patient.     ROS otherwise unremarkable    PE: male in no distress. CV: pulses intact. CHEST: normal work of breathing. ABD: nondistended. SKIN: normal. EXT: FROM. NEURO: awake, alert, oriented. no focal gross motor / sensory deficits. Deconditioned. Memory, speech, coordination and cognition grossly intact. HEENT: EOMI     Impression: confusion     Plan: IV labs imaging supportive care and reevaluation

## 2022-06-25 NOTE — ED PROVIDER NOTE - PHYSICAL EXAMINATION
GENERAL: Well-nourished, Well-developed. NAD.  HEAD: No visible or palpable bumps or hematomas. No ecchymosis behind ears B/L.  Eyes: PERRLA, EOMI. No asymmetry. No nystagmus. No conjunctival injection. Non-icteric sclera.  ENMT: MMM.   Neck: Supple. FROM  CVS: Normal S1,S2. No murmurs appreciated on auscultation   RESP: Lungs clear to auscultation B/L. No wheezing, rales, or rhonchi auscultated.  GI: Normal auscultation of bowel sounds in all 4 quadrants. Soft, Nontender, Nondistended. No guarding or rebound tenderness. No CVAT B/L.  Skin: Warm, Dry. No rashes or lesions. Good cap refill < 2 sec B/L.  EXT: Radial and pedal pulses present B/L. No calf tenderness or swelling B/L. No palpable cords. No pedal edema B/L.  Neuro: AA&O x 3. CNs II-XII grossly intact. Speaking in full sentences. No slurring of speech. No facial droop. No tremors. Sensation grossly intact. Strength 5/5 B/L. Negative Romberg and Pronator Drift.

## 2022-06-25 NOTE — ED PROVIDER NOTE - NS ED ROS FT
Constitutional: (-) fever (-) malaise (-) diaphoresis (-) chills   Eyes: (-) visual changes (-) eye pain (-) eye discharge (-) photophobia (-) FB sensation  Cardiac: (-) chest pain  (-) palpitations (-) syncope (-) edema  Respiratory: (-) cough (-) SOB (-) FIGUEROA  GI: (-) nausea (-) vomiting (-) diarrhea (-) abdominal pain   : (-) dysuria (-) increased frequency  (-) hematuria (-) incontinence  MS: (-) back pain (-) myalgia (-) muscle weakness (-)  joint pain  Neuro: (-) headache (-) dizziness (-) numbness/tingling to extremities B/L (-) weakness (-) LOC (-) head injury (+)confusion  Skin: (-) rash (-) laceration    Except as documented in the HPI, all other systems are negative.

## 2022-07-19 NOTE — REVIEW OF SYSTEMS
[Change In Personality] : personality change [As Noted in HPI] : as noted in HPI [Loss Of Hearing] : hearing loss [Negative] : Heme/Lymph

## 2022-07-19 NOTE — PHYSICAL EXAM
[General Appearance - In No Acute Distress] : in no acute distress [General Appearance - Alert] : alert [General Appearance - Well Nourished] : well nourished [Affect] : the affect was normal [General Appearance - Well Developed] : well developed [Person] : oriented to person [Place] : oriented to place [Time] : oriented to time [Fluency] : fluency intact [Comprehension] : comprehension intact [Cranial Nerves Optic (II)] : visual acuity intact bilaterally,  visual fields full to confrontation, pupils equal round and reactive to light [Cranial Nerves Oculomotor (III)] : extraocular motion intact [Cranial Nerves Trigeminal (V)] : facial sensation intact symmetrically [Cranial Nerves Facial (VII)] : face symmetrical [Cranial Nerves Glossopharyngeal (IX)] : tongue and palate midline [Cranial Nerves Accessory (XI - Cranial And Spinal)] : head turning and shoulder shrug symmetric [Cranial Nerves Hypoglossal (XII)] : there was no tongue deviation with protrusion [Paresis Pronator Drift Right-Sided] : no pronator drift on the right [Paresis Pronator Drift Left-Sided] : no pronator drift on the left [Motor Strength Upper Extremities Bilaterally] : strength was normal in both upper extremities [Motor Strength Lower Extremities Bilaterally] : strength was normal in both lower extremities [Sensation Tactile Decrease] : light touch was intact [Coordination - Dysmetria Impaired Finger-to-Nose Right Only] : present on the ride side [Coordination - Dysmetria Impaired Finger-to-Nose Left Only] : not present on the left side [2+] : Patella left 2+ [FreeTextEntry4] : [place] 11/30 [FreeTextEntry8] : Stands with some assistance, ambulates with cane, normal stance, short stride.  [Sclera] : the sclera and conjunctiva were normal [PERRL With Normal Accommodation] : pupils were equal in size, round, reactive to light, with normal accommodation [FreeTextEntry1] : decreased hearing b/l [Neck Appearance] : the appearance of the neck was normal [] : no respiratory distress [Respiration, Rhythm And Depth] : normal respiratory rhythm and effort [Heart Rate And Rhythm] : heart rate was normal and rhythm regular [Heart Sounds] : normal S1 and S2 [Arterial Pulses Carotid] : carotid pulses were normal with no bruits

## 2022-07-19 NOTE — REASON FOR VISIT
[Initial Evaluation] : an initial evaluation [Family Member] : family member [FreeTextEntry1] : memory decline

## 2022-07-19 NOTE — DISCUSSION/SUMMARY
[FreeTextEntry1] : Pt is a 89 yo M with PMHx of CAD s/p CABG, afib not on AC (sopped after fall in the past), s/p AICD, HTN, CKD, GERD, traumatic SDH over 10 years ago, bilateral hearing loss, who presents with his daughter with c/o progressive memory loss.\par \par # Dementia: suspected mild, developing dementia prior to fall, cognition worsened with hospitalizations and being in unfamiliar environment. moCA 11/30. Also noted behavioral changes. BP elevated today, SBP in 170's. Anti-hypertensives being adjusted by PCP and cardiologist. DDx AD, vascular dementia and FTD.\par - MRI brain without contrast\par - Labs\par - rEEG\par - interaction between donepezil and amiodarone (potential QT prolongation). Start memantine 5mg daily\par - melatonin 5mg QHS\par - May consider seroquel or VPA if behavioral symptoms continue to progress\par  -neuropsych testing\par \par # Orthostatic hypotension:\par - discussed fall precautions, taking time to adjust when changing positions\par - consider compression stockings\par \par RTC in 3mo

## 2022-07-19 NOTE — HISTORY OF PRESENT ILLNESS
[FreeTextEntry1] : Pt is a 89 yo M with PMHx of CAD s/p CABG, afib not on AC (sopped after fall in the past), s/p AICD, HTN, CKD, GERD, traumatic SDH over 10 years ago, bilateral hearing loss, who presents with his daughter with c/o worsening memory loss. Pt used to live alone, did not drive or cook, but was able to take care of almost all of his ADL's independently. He had a fall in May, was admitted to the hospital, had surgery for right arm fracture, after which he exhibited severe post op delirium. he was discharged to [facility], was isolated to his room, worsening memory and cognition. He was found to be covid positive in June, eventually moved to a senior living facility. Initially only had 12 hr care, now has a live in aide. Pt's daughter states that his memory has continued to decline significantly since the fall. He forgets conversations, hallucinates, gets agitated easily, needs help with ADl's. Prior to the fall, she had noticed he was having difficulty with paying his bills as he would write the wrong address, was unable to repeat back numbers, and would have some word finding difficulty. They also noticed some issues with personal hygiene, not bathing routinely.Pt's daughter also notes decreased appetite. Prior falls were attributed to orthostatic hypotension. Pt endorses dizziness upon standing in the morning. Pt finished high school, retired from working as a  and lieutenant.

## 2022-07-28 PROBLEM — E78.5 HYPERLIPIDEMIA: Status: ACTIVE | Noted: 2021-07-14

## 2022-07-28 NOTE — REVIEW OF SYSTEMS
[Fever] : no fever [Blurry Vision] : no blurred vision [Hearing Loss] : hearing loss [SOB] : no shortness of breath [Chest Discomfort] : no chest discomfort [Palpitations] : no palpitations [Cough] : no cough [Wheezing] : no wheezing [Abdominal Pain] : no abdominal pain [Diarrhea] : diarrhea [Constipation] : no constipation [Pain During Urination] : no dysuria [Joint Pain] : no joint pain [Rash] : no rash [Myalgia] : no myalgia [Skin Lesions] : skin lesion(s): [Dizziness] : no dizziness [Weakness] : no weakness [Confusion] : no confusion was observed [Easy Bleeding] : no tendency for easy bleeding [de-identified] : sees derm

## 2022-07-28 NOTE — DISCUSSION/SUMMARY
[FreeTextEntry1] : Pt aware need low salt 1500 mg Na diet. Told to inform us if gained more wt and increased SOB. H/O PAF not on anticoagulation. Pt at risk to fall .His ICD has not fired. His  EF  is 55% . Had non dx DSE 5/15.  Possible Persantine soon. Wants to wait for now. Echo 5/18 aorta 3.8cm EF 55% MILD MR. Told watch food w salt. Gets food from meals on wheels . Amiodarone decreased by Bekeit to 100mg  7 days/wk. She also decreased ACE. Still at times lightheaded. But stable.  . Edema legs resolved. FIGUEROA stable.  He lost 1 pond.  Echo 1/21 EF 50% MOD MR Not drive. Told try walk. He got 3 covid vaccines.  Got  iron infusions. he got 5.  See heme Dr Oro. Reviewed getting watchman . Risk benefit. He is 89 not sure he wants to do procedure at his age. Told see opth.  On amiodarone. \par He fell  may 6 ,2022 Since dementia. He denies chest pain. No sob . Long discussion with family re prognosis. May consider shutting off defibrillator

## 2022-07-28 NOTE — HISTORY OF PRESENT ILLNESS
[FreeTextEntry1] : Patient with escudero.No change.  No sob. No Chest pain. . No palpitations. AICD  not fire.  He fell  may 6 ,2022 Since dementia. He denies chest pain. No sob .

## 2022-09-20 PROBLEM — C44.329 SQUAMOUS CELL CANCER OF SKIN OF LEFT TEMPLE: Status: ACTIVE | Noted: 2022-01-01

## 2022-09-20 NOTE — HISTORY OF PRESENT ILLNESS
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 Irene. \par \par \par Nonsmoker\par Retired [employer]\par \par PMD Dr. Lianne Mujica\par Cardiologist Dr. Combs

## 2022-09-20 NOTE — ASSESSMENT
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 \par \par - medical/cardiac clearance\par \par as above\par seen/examined w pt's daughter\par \par has fungating left preauricular SCC \par discussed options, including Mohs, RT, resection in OR, evlaution of sentinel lymph node\par \par comprehensive discussion re management and risk mitigation strategies\par \par discussed possible PET scan as eval for a clinically negative neck (no LAd appreciated)\par daughter, patient and I have agreed to forgo PET scan as that will not menaingfully \par \par plan for resection in OR w flap recon, possible skin graft\par \par Regarding the reconstruction after skin cancer  surgery, we discussed scarring, risk of repeat procedure, poor wound healing, need for additional revisionary surgery,asymmetry, dissatisfaction with the outcome, and unplanned surgery in the future.  All questions were answered.  All risks were well understood by the patient.\par \par Regarding the procedure, we discussed scarring, poor wound healing, bleeding, infection, need for additional surgery, and dissatisfaction with the outcome.  Also discussed possibility of keloid and/or hypertrophic scar formation as well as recurrence.  All questions were answered and risks understood.\par \par Due to COVID 19, pre-visit patient instructions were explained to the patient and their symptoms were checked upon arrival.  \par Masks were used by the health care providers and staff and the examination room was cleaned after the patient visit was completed.\par \par Photos taken with patient permission.\par \par reviewed herminio Siddiqui--agreed w plan

## 2022-09-20 NOTE — PHYSICAL EXAM
[de-identified] : elderly male, pleasant, NAD [de-identified] : NC/AT [de-identified] : supple [de-identified] : unlabored breathing [de-identified] : KINJALR [de-identified] : soft, nontender [de-identified] : Face - left preauricular skin with large 3x2 cm raised skin lesion with center crater that’s ulcerated and inflamed, somewhat adhered to the underlying structures

## 2022-10-10 NOTE — H&P PST ADULT - NSANTHOSAYNRD_GEN_A_CORE
No. AMAURY screening performed.  STOP BANG Legend: 0-2 = LOW Risk; 3-4 = INTERMEDIATE Risk; 5-8 = HIGH Risk

## 2022-10-10 NOTE — H&P PST ADULT - REASON FOR ADMISSION
Patient is a 89 year old  male presenting to PAST in preparation for  EXCISION OF LEFT FACIAL SQUAMOUS CELL CARCINOMA / COVERAGE WITH LOCAL FLAP OR FULL THICKNESS SKIN GRAFT FROM RIGHT/LEFT FACE    on   10/31/22 under general anesthesia by Dr. Doe

## 2022-10-10 NOTE — H&P PST ADULT - HISTORY OF PRESENT ILLNESS
pt dx hx  skin  ca, lesion to left side has got bigger in past 2-3 months   now for planned procedure       PATIENT CURRENTLY DENIES CHEST PAIN  SHORTNESS OF BREATH  PALPITATIONS,  DYSURIA,   pt currently w cough - improving- now on abx  EXERCISE  TOLERANCE  25 feet WITHOUT SHORTNESS OF BREATH  denies travel outside the [place] in the past 30 days  Patient denies any signs or symptoms of COVID 19 and denies contact with known positive individuals.  They have an appointment for repeat COVID testing pre-procedure and acknowledge its time and place.  They were instructed to quarantine pre-procedure, practice exposure control measures, continue to self-monitor and report any concerns to their proceduralist.  pt advised self quarantine till day of procedure  Anesthesia Alert  NO--Difficult Airway  NO--History of neck surgery or radiation  NO--Limited ROM of neck  NO--History of Malignant hyperthermia  NO--No personal or family history of Pseudocholinesterase deficiency.  Prior Anesthesia Complication -- confused and agitated   NO--Latex Allergy  NO--Loose teeth  NO--History of Rheumatoid Arthritis  NO--Bleeding risk  NO--AMAURY  NO--Other_____    PT DENIES ANY RASHES, ABRASION, OR OPEN WOUNDS OR CUTS    AS PER THE PT, THIS IS HIS/HER COMPLETE MEDICAL AND SURGICAL HX, INCLUDING MEDICATIONS PRESCRIBED AND OVER THE COUNTER    Patient verbalized understanding of instructions and was given the opportunity to ask questions and have them answered.    pt denies any suicidal ideation or thoughts, pt states not a threat to self or others    C44.329/[id number]    Squamous cell carcinoma of skin of other parts of face    Encounter for other preprocedural examination    ^C44.329/[id number]    Squamous cell carcinoma of skin of other parts of face    Encounter for other preprocedural examination    Heart disease    Hypertension    Skin cancer    History of open heart surgery    FHx: skin cancer

## 2022-10-17 PROBLEM — Z45.02 ENCOUNTER FOR ADJUSTMENT OF CARDIAC RESYNCHRONIZATION THERAPY DEFIBRILLATOR: Status: ACTIVE | Noted: 2021-09-13

## 2022-10-17 NOTE — PROCEDURE
[Complete Heart Block] : complete heart block [See Scanned Paceart Report] : See scanned paceart report [See Device Printout] : See device printout [CRT-D] : Cardiac resynchronization therapy defibrillator [DDDR] : DDDR [Voltage: ___ volts] : Voltage was [unfilled] volts [Charge Time: ___ sec] : charge time was [unfilled] seconds [Longevity: ___ months] : The estimated remaining battery life is [unfilled] months [Threshold Testing Performed] : Threshold testing was performed [Atrial] : Atrial [Ventricular] : Ventricular [Sensing Amplitude ___mv] : sensing amplitude was [unfilled] mv [Lead Imp:  ___ohms] : lead impedance was [unfilled] ohms [___V @] : [unfilled] V [___ ms] : [unfilled] ms [Programmed for Longevity] : output reprogrammed for improved battery longevity [Asense-Vsense ___ %] : Asense-Vsense [unfilled]% [Asense-Vpace ___ %] : Asense-Vpace [unfilled]% [Apace-Vsense ___ %] : Apace-Vsense [unfilled]% [Apace-Vpace ___ %] : Apace-Vpace [unfilled]% [de-identified] : Medtronic [de-identified] : [id number] [de-identified] : [id number] [de-identified] : 7/8/2016 [de-identified] : 70 [de-identified] : AFL since May 2022\par Optivol high since 7/1/22\par Total BiV P 93.5%

## 2022-10-17 NOTE — HISTORY OF PRESENT ILLNESS
[de-identified] : \par Cardiologist: Dr. Combs\par \par 89 yo M with history of CAD s/p 4v CABG (2015), PCI, DM, HTN, sustained VT on 3/16/17 s/p DC ICD (3/17/17) for secondary prevention and right CVA (June 2017) who presents for routine follow up of ICD. Of note, pt lost his wife to liver ca in 2019. AF seen on remote in June. Case was discussed with Dr. Combs's office who know patient well. No AC at this time due to history of AFib on Coumadin with fall and SDH. \par \par He denies chest pain, palpitations, dyspnea, dizziness, lightheadedness, presyncope or syncope. He states he feels well overall. Recently diagnosed with demential. Sustained fall in May and broke elbow. Lives in assisted living.

## 2022-10-17 NOTE — ASSESSMENT
[FreeTextEntry1] : Mr. Leslie has a history of CAD s/p CABG, sustained VT on Amio, ICM s/p CRT-D here for routine device follow up. \par \par # ICM (EF 50%)\par - CRT-D interrogation as described above. Optivol elevated with atrial flutter since May. Patient incidentally sustained fall in May and broke elbow.\par - Remote monitor is set up and patient is transmitting.\par \par # History of Sustained VT\par - On Amio. Labs from April reviewed. Normal LFTs. TSH elevated. Ordered CBC, CMP, TFTs today.\par - Has pulm and ophtho referrals , has not went. \par \par # PAF\par - In atrial flutter since may. RVR to 150s per histograms. CHADS VASc of at least 5 (CHF, HTN, Age > 75, CAD)\par - Metoprolol increased from 25mg twice daily to 37.5mg in effort to mitigate RVR.\par - Discussed with patient's daughter, as patient cannot tolerate OAC, conservative management for now. Discussed Watchman again but she prefers that patient not have any surgical procedure involving general anesthesia due to age and condition. \par - ECHO ordered to reassess EF; will have done today at 501\par \par # HTN\par - BP well controlled\par - 2g Na diet enforced\par - Continue Benazepril\par \par I have also advised the patient to go to the nearest emergency room if he experiences any chest pain, dyspnea, syncope, or has any other compelling symptoms.\par \par Follow up in 4 months. \par \par

## 2022-10-17 NOTE — CARDIOLOGY SUMMARY
[de-identified] : 10/17/2022: Aflutter, BIV-paced\par 4/11/2022 A-sensed, Bi-V paced (HR 71 bpm)\par 9/13/21, A paced, BiV paced (HR 72 bpm)  [de-identified] : 1/20/21, Mod MR LVEF 50%.

## 2022-10-17 NOTE — PHYSICAL EXAM
[General Appearance - Well Developed] : well developed [Normal Appearance] : normal appearance [Well Groomed] : well groomed [General Appearance - Well Nourished] : well nourished [No Deformities] : no deformities [General Appearance - In No Acute Distress] : no acute distress [Heart Rate And Rhythm] : heart rate and rhythm were normal [Heart Sounds] : normal S1 and S2 [Edema] : no peripheral edema present [] : no respiratory distress [Respiration, Rhythm And Depth] : normal respiratory rhythm and effort [Exaggerated Use Of Accessory Muscles For Inspiration] : no accessory muscle use [Auscultation Breath Sounds / Voice Sounds] : lungs were clear to auscultation bilaterally [Left Infraclavicular] : left infraclavicular area [Clean] : clean [Dry] : dry [Well-Healed] : well-healed [Abdomen Soft] : soft [Nail Clubbing] : no clubbing of the fingernails [FreeTextEntry1] : 2/6 systolic murmur

## 2022-11-03 PROBLEM — Z95.810 ICD (IMPLANTABLE CARDIOVERTER-DEFIBRILLATOR), BIVENTRICULAR, IN SITU: Status: ACTIVE | Noted: 2020-08-05

## 2022-11-03 PROBLEM — C44.90 MALIGNANT NEOPLASM OF SKIN: Status: ACTIVE | Noted: 2020-07-23

## 2022-11-03 PROBLEM — I25.5 CARDIOMYOPATHY, ISCHEMIC: Status: ACTIVE | Noted: 2020-08-05

## 2022-11-03 PROBLEM — I10 BENIGN ESSENTIAL HTN: Status: ACTIVE | Noted: 2020-07-23

## 2022-11-03 PROBLEM — I25.10 ARTERIOSCLEROSIS OF CORONARY ARTERY: Status: ACTIVE | Noted: 2021-07-14

## 2022-11-03 PROBLEM — I48.0 PAROXYSMAL ATRIAL FIBRILLATION: Status: ACTIVE | Noted: 2021-06-22

## 2022-11-03 PROBLEM — C85.90 NON-HODGKIN LYMPHOMA: Status: ACTIVE | Noted: 2021-05-05

## 2022-11-03 PROBLEM — R41.89 COGNITIVE DECLINE: Status: ACTIVE | Noted: 2022-01-01

## 2022-11-03 PROBLEM — N18.30 CKD (CHRONIC KIDNEY DISEASE), STAGE III: Status: ACTIVE | Noted: 2021-07-14

## 2022-11-03 NOTE — REVIEW OF SYSTEMS
[Fever] : no fever [Blurry Vision] : no blurred vision [Hearing Loss] : hearing loss [SOB] : no shortness of breath [Chest Discomfort] : no chest discomfort [Palpitations] : no palpitations [Cough] : no cough [Wheezing] : no wheezing [Abdominal Pain] : no abdominal pain [Diarrhea] : diarrhea [Constipation] : no constipation [Pain During Urination] : no dysuria [Joint Pain] : no joint pain [Myalgia] : no myalgia [Rash] : no rash [Skin Lesions] : skin lesion(s): [Dizziness] : no dizziness [Weakness] : no weakness [Confusion] : no confusion was observed [Easy Bleeding] : no tendency for easy bleeding [de-identified] : sees derm

## 2022-11-03 NOTE — DISCUSSION/SUMMARY
[FreeTextEntry1] : Pt aware need low salt 1500 mg Na diet. Told to inform us if gained more wt and increased SOB. H/O PAF not on anticoagulation. Pt at risk to fall .His ICD has not fired. His  EF  is 55% . Had non dx DSE 5/15.  Possible Persantine soon. Wants to wait for now. Echo 5/18 aorta 3.8cm EF 55% MILD MR. Told watch food w salt. Gets food from meals on wheels . Amiodarone decreased by Bekeit to 100mg  7 days/wk. She also decreased ACE. Still at times lightheaded. But stable.  . Edema legs resolved. FIGUEROA stable.  He lost 1 pond.  Echo 1/21 EF 50% MOD MR Not drive. Told try walk. He got 3 covid vaccines.  Got  iron infusions. he got 5.  See heme Dr Oro. Reviewed getting watchman . Risk benefit. He is 89 not sure he wants to do procedure at his age. Told see opth.  On amiodarone.  He denies chest pain. No sob . Long discussion with family re prognosis. He has vascular dementia. \par Long discussion with family. They agree to defibrillator being shut off. Aware prognosis. To make appointment with EP to shut off defibrillator.

## 2022-11-03 NOTE — PHYSICAL EXAM
[5th Left ICS - MCL] : palpated at the 5th LICS in the midclavicular line [No Precordial Heave] : no precordial heave was noted [Normal Rate] : normal [Rhythm Regular] : regular [Normal S1] : normal S1 [S3] : no S3 [Normal S2] : normal S2 [S4] : no S4 [I] : a grade 1 [No Pitting Edema] : no pitting edema present [2+] : left 2+ [Right Carotid Bruit] : no bruit heard over the right carotid [No Abnormalities] : the abdominal aorta was not enlarged and no bruit was heard [Left Carotid Bruit] : no bruit heard over the left carotid [Normal] : alert and oriented, normal memory

## 2022-11-03 NOTE — HISTORY OF PRESENT ILLNESS
[FreeTextEntry1] : Patient with escudero.No change.  No sob. No Chest pain. . No palpitations. AICD  not fire.  He fell  may 6 ,2022.  Dementia worse.

## 2022-11-10 PROBLEM — F03.918 DEMENTIA WITH BEHAVIORAL DISTURBANCE: Status: ACTIVE | Noted: 2022-01-01

## 2022-11-10 NOTE — REVIEW OF SYSTEMS
[Recent Weight Loss (___ Lbs)] : recent [unfilled] ~Ulb weight loss [As Noted in HPI] : as noted in HPI [Eyesight Problems] : eyesight problems [Loss Of Hearing] : hearing loss [Skin Lesions] : skin lesion [Negative] : Heme/Lymph

## 2022-11-10 NOTE — PHYSICAL EXAM
[General Appearance - Alert] : alert [General Appearance - In No Acute Distress] : in no acute distress [General Appearance - Well Nourished] : well nourished [General Appearance - Well Developed] : well developed [Affect] : the affect was normal [Person] : oriented to person [Place] : oriented to place [Time] : disoriented to time [Fluency] : fluency intact [Comprehension] : comprehension intact [Cranial Nerves Optic (II)] : visual acuity intact bilaterally,  visual fields full to confrontation, pupils equal round and reactive to light [Cranial Nerves Oculomotor (III)] : extraocular motion intact [Cranial Nerves Trigeminal (V)] : facial sensation intact symmetrically [Cranial Nerves Facial (VII)] : face symmetrical [Cranial Nerves Vestibulocochlear (VIII)] : hearing was intact bilaterally [Cranial Nerves Glossopharyngeal (IX)] : tongue and palate midline [Cranial Nerves Accessory (XI - Cranial And Spinal)] : head turning and shoulder shrug symmetric [Cranial Nerves Hypoglossal (XII)] : there was no tongue deviation with protrusion [Paresis Pronator Drift Right-Sided] : no pronator drift on the right [Paresis Pronator Drift Left-Sided] : no pronator drift on the left [Motor Strength Upper Extremities Bilaterally] : strength was normal in both upper extremities [Motor Strength Lower Extremities Bilaterally] : strength was normal in both lower extremities [Sensation Tactile Decrease] : light touch was intact [Coordination - Dysmetria Impaired Finger-to-Nose Right Only] : present on the ride side [Coordination - Dysmetria Impaired Finger-to-Nose Left Only] : not present on the left side [2+] : Patella left 2+ [FreeTextEntry8] : deferred gait assessemnt due to pt safety concern [PERRL With Normal Accommodation] : pupils were equal in size, round, reactive to light, with normal accommodation [Neck Appearance] : the appearance of the neck was normal [] : no respiratory distress [Respiration, Rhythm And Depth] : normal respiratory rhythm and effort [Heart Sounds] : normal S1 and S2 [Arterial Pulses Carotid] : carotid pulses were normal with no bruits

## 2022-11-10 NOTE — HISTORY OF PRESENT ILLNESS
[FreeTextEntry1] : Interval History:\par Pt presents today via wheelchair with his daughter. States that he has had worsening agitation, paranoia, and visual hallucinations. Today is one of the better days he has had in a while. Also decreased appetite. he was started on mirtazapine but no improvement in appetite. He is having surgery for squamous cell skin cancer removal early next week. Denies any recent falls. \par \par HPI:  Pt is a 89 yo M with PMHx of CAD s/p CABG, afib not on AC (sopped after fall in the past), s/p AICD, HTN, CKD, GERD, traumatic SDH over 10 years ago, bilateral hearing loss, who presents with his daughter with c/o worsening memory loss. Pt used to live alone, did not drive or cook, but was able to take care of almost all of his ADL's independently. He had a fall in May, was admitted to the hospital, had surgery for right arm fracture, after which he exhibited severe post op delirium. he was discharged to [facility], was isolated to his room, worsening memory and cognition. He was found to be covid positive in June, eventually moved to a senior living facility. Initially only had 12 hr care, now has a live in aide. Pt's daughter states that his memory has continued to decline significantly since the fall. He forgets conversations, hallucinates, gets agitated easily, needs help with ADl's. Prior to the fall, she had noticed he was having difficulty with paying his bills as he would write the wrong address, was unable to repeat back numbers, and would have some word finding difficulty. They also noticed some issues with personal hygiene, not bathing routinely.Pt's daughter also notes decreased appetite. Prior falls were attributed to orthostatic hypotension. Pt endorses dizziness upon standing in the morning. Pt finished high school, retired from working as a  and lieutenant.

## 2022-11-10 NOTE — DISCUSSION/SUMMARY
[FreeTextEntry1] : Pt is a 89 yo M with PMHx of CAD s/p CABG, afib not on AC (sopped after fall in the past), s/p AICD, HTN, CKD, GERD, traumatic SDH over 10 years ago, bilateral hearing loss, who presents with his daughter with c/o progressive memory loss.\par \par # Dementia: suspected mild, developing dementia prior to fall, cognition worsened with hospitalizations and being in unfamiliar environment. MoCA 11/30 during prior visit. Also noted behavioral changes. DDx AD/ vascular dementia. \par - Continue memantine 5mg daily\par - melatonin 5mg QHS\par - Continue VPA sprinkles [phone]\par - Start seroquel 12.5mg QHS\par - Repeat EKG in 1 week\par - Hold mirtazapine as here hasn't been significant improvement in appetite and it may potentiate QTc prolongation, especially as we are starting seroquel\par \par RTC in 3 mo \par

## 2022-11-11 NOTE — ASU PATIENT PROFILE, ADULT - FALL HARM RISK - HARM RISK INTERVENTIONS

## 2022-11-11 NOTE — ASU PATIENT PROFILE, ADULT - NSICDXPASTMEDICALHX_GEN_ALL_CORE_FT
PAST MEDICAL HISTORY:  Heart disease     Hypertension     Presence of automatic cardioverter/defibrillator (AICD)     Skin cancer

## 2022-11-14 NOTE — ASU DISCHARGE PLAN (ADULT/PEDIATRIC) - ASU DC SPECIAL INSTRUCTIONSFT
- - -
NOTIFY YOUR SURGEON IF YOU HAVE: any bleeding that does not stop, any pus draining from your wound(s), any fever (over 100.4 F) persistent nausea/vomiting, or if your pain is not controlled on your discharge pain medications, unable to urinate.    ACTIVITY: Please refrain from increased physical activity for a period of 2 weeks. Please do not lift anything heavier than a gallon of milk or about 5 pounds. Upon follow up you will be given further instructions on how much physical activity you may do.     ANTIBIOTICS: Please take any prescribed antibiotics as directed. These will be sent to your designated pharmacy    Dressing: please leave dressing in place until follow up. Please keep the dressing clean and dry while you recover.     PAIN: Please take 650mg Tylenol every 6 hours as needed. Please do not exceed 4000mg Tylenol in any 24 hour period.     Please apply cool pack to the left side of the face every 20 minutes     Please follow up with Dr. Doe. Please call his office at the number provided to schedule this appointment. Please call within 48 hours of leaving the hospital.

## 2022-11-14 NOTE — CHART NOTE - NSCHARTNOTEFT_GEN_A_CORE
Electrophysiology    Pts device _BiV ICD ([employer]_____ was interrogated on 11-14-22  Device working properly  Events: no events  Pt is in AFL, known chronic, not on AC per pt/family wishes  Pt is __NOT___ pacemaker dependent   AP 5.7   % /   95.2  %  AT/AF burden  100   %  Underlying rhythm  Mode  Battery     Preliminary results d/w with primary team  Awaiting / Reviewed by attending    Contact EP ACP with any questions [phone]

## 2022-11-14 NOTE — CHART NOTE - NSCHARTNOTEFT_GEN_A_CORE
PACU ANESTHESIA ADMISSION NOTE      Procedure:   Post op diagnosis:      ____  Intubated  TV:______       Rate: ______      FiO2: ______    __x__  Patent Airway    __x__  Full return of protective reflexes    __x__  Full recovery from anesthesia / back to baseline status    Vitals:  T(C): 36.1 (11-14-22 @ 07:30), Max: 36.1 (11-14-22 @ 07:30)  HR: 69 (11-14-22 @ 07:30) (69 - 69)  BP: 137/66 (11-14-22 @ 07:30) (137/66 - 137/66)  RR: 18 (11-14-22 @ 07:30) (18 - 18)  SpO2: 98% (11-14-22 @ 07:30) (98% - 98%)    Mental Status:  __x__ Awake   ___x__ Alert   _____ Drowsy   _____ Sedated    Nausea/Vomiting:  __x__ NO  ______Yes,   See Post - Op Orders          Pain Scale (0-10):  _2____    Treatment: ____ None    __x__ See Post - Op/PCA Orders    Post - Operative Fluids:   ____ Oral   __x__ See Post - Op Orders    Plan: Discharge:   __x__Home       _____Floor     _____Critical Care    _____  Other:_________________    Comments: Patient had smooth intraoperative event, no anesthesia complication. [email or web address] rep to be contacted regarding need for interrogation.  PACU Vital signs: HR:   62         BP:   127/61       RR: 18            O2 Sat:       100%     Temp 97.7

## 2022-11-14 NOTE — BRIEF OPERATIVE NOTE - NSICDXBRIEFPROCEDURE_GEN_ALL_CORE_FT
PROCEDURES:  Excision of malignant lesion of face, 2.1 to 3.0 cm in diameter 14-Nov-2022 10:27:22  Yared Mata

## 2022-11-14 NOTE — ASU DISCHARGE PLAN (ADULT/PEDIATRIC) - CARE PROVIDER_API CALL
Omid Doe)  Plastic Surgery; Surgery of the Hand  [address] [place] [address], [address]  [place] [address]  Phone: [phone]  Fax: [phone]  Follow Up Time:

## 2022-11-14 NOTE — BRIEF OPERATIVE NOTE - OPERATION/FINDINGS
excision of left facial squamous cell carcinoma with reconstruction with local flap elevation and closure.

## 2022-11-16 PROBLEM — C44.329 SQUAMOUS CELL CARCINOMA OF SKIN OF LEFT CHEEK: Status: ACTIVE | Noted: 2022-01-01

## 2022-11-16 PROBLEM — Z95.810 PRESENCE OF AUTOMATIC (IMPLANTABLE) CARDIAC DEFIBRILLATOR: Chronic | Status: ACTIVE | Noted: 2022-01-01

## 2022-11-16 NOTE — ASSESSMENT
[FreeTextEntry1] : 89 yo M with multiple medical issues with new large SCC left preauricular area now POD#2 s/p WLE with LTR. Doing well. \par \par - dressing changed\par - sleep on the back in reclined position\par - continue oral antibiotics to completion \par - keep are dry until first post-op \par - post-op instructions discussed and all his questions were answered \par - f/u 1 week for suture removal and wound care \par \par Due to COVID 19, pre-visit patient instructions were explained to the patient and their symptoms were checked upon arrival.  \par Masks were used by the health care providers and staff and the examination room was cleaned after the patient visit was completed.\par \par

## 2022-11-16 NOTE — HISTORY OF PRESENT ILLNESS
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 Irene. \par \par \par Nonsmoker\par Retired [employer]\par \par PMD Dr. Lianne Mujica\par Cardiologist Dr. Combs \par \par Interval hx (11/16/22). Pt presents today POD#2 s/p excision of left cheek/temple SCC with LTR c/o drainage from the wound. Otherwise doing very well. Denies any significant pain, fever or chills. Taking all prescribed medications.

## 2022-11-21 NOTE — ASSESSMENT
[FreeTextEntry1] : 89 yo M with multiple medical issues with new large SCC left preauricular area now 1 week s/p WLE with LTR. Doing ok. \par \par - Some sutures removed\par - Wound care: emphasized that pt can not touch the wound. Recommended baci daily with non stick bandage & ACE head wrap for extra cushion on the wound \par - Steri strips to remainder of incision\par - sleep on the back in reclined position\par - continue oral antibiotics to completion: refill sent in light of carlita incisional erythema and pt picking at wound\par - keep area dry\par - post-op instructions discussed and all questions answered \par - f/u 1 week for remaining suture removal and wound care \par \par Due to COVID 19, pre-visit patient instructions were explained to the patient and their symptoms were checked upon arrival.  \par Masks were used by the health care providers and staff and the examination room was cleaned after the patient visit was completed.\par \par

## 2022-11-21 NOTE — HISTORY OF PRESENT ILLNESS
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 Irene. \par \par \par Nonsmoker\par Retired [employer]\par \par PMD Dr. Lianne Mujica\par Cardiologist Dr. Combs \par \par Interval hx (11/16/22). Pt presents today POD#2 s/p excision of left cheek/temple SCC with LTR c/o drainage from the wound. Otherwise doing very well. Denies any significant pain, fever or chills. Taking all prescribed medications. \par \par Interval hx (11/21/22): Pt is 1 week s/p excision of left cheek/temple SCC with LTR. Daughter present, pt with significant dementia.  Denies fever/chills/n/v, & states pts aid has not reported any issues either. Denies any pain.  They have been changing the bandage frequently due to falling off/becoming saturated. Daughter states Aneudy sometimes "picks" at his skin.

## 2022-11-21 NOTE — PHYSICAL EXAM
[de-identified] : elderly male, pleasant, NAD [de-identified] : L eye ectropion from post op swelling, EOMIs [de-identified] : unlabored breathing [de-identified] : KINJALR [de-identified] : Face - left preauricular and cheek incision with area of scabbed over T point epidermolysis with scant yellow/red fluid. Mild carlita incisional erythema. Otherwise remainder of incision CDI. No seroma/hematoma/cellulitis. Sutures in place, some loose & not approximating well

## 2022-11-22 NOTE — CONSULT NOTE ADULT - SUBJECTIVE AND OBJECTIVE BOX
***Delay in consult note due to clinical duties***    SICU Consultation Note    HPI:  89yM w/ PMHx of Squamous cell carcinoma s/p resection and local flap with Dr. Doe 11/14/22, CAD s/p CABG 25 years ago, Afib and sick sinus syndrome s/p biventricular pacemaker and ICD, HTN, CKD3, GERD, HFrEF, lymphoma being followed by patient's oncologist seen as Trauma consult s/p unwitnessed fall at nursing home .  Trauma assessment in ED: ABCs intact , GCS 14 , AAOx0. Patient brought in by EMS, daughter at bedside to give history. Patient has history of frequent falls and is not on anticoagulation for that reason. She states his baseline mental status is AAOx1-2, but he seemed more confused to her today. CTH negative, CT-Cspine negative. CT chest/abdomen/pelvis revealing R acromion fracture seen on CT vs. distal clavicular fracture on XR with associated 4x3 cm hematoma, b/l moderate-large pleural effusions, acute nondisplaced fractures of b/l 11-12 posterior ribs, age-indeterminate right 5-6 lateral rib fractures and left 10-11 lateral rib fractures, age-indeterminate L2-L4 compression deformities.   Non-traumatic injuries identified: heterogenous b/l renal cortical soft tissue masses suspicious of neoplasm, thickening of left anteriolateral bladder wall, cholelithiasis, fat-containing left inguinal hernia. (22 Nov 2022 14:42)    Patient was seen by orthopedic surgery team for right acromion fx, no orthopedic intervention at this time, f/u outpatient with Dr. Jeter. Patient seen by neurosurgery for L2-L4 vertebral body fractures, no acute neurosurgical intervention at this time, lumbar brace for support when OOB.    Pt seen bedside in ED, patient does not appear to be in any acute distress. Pt is a/o x2 (to person and time; this is baseline as per patient's daughter). Vital signs: HR 76, /82, SpO2 >98% on room air. Patient denies any acute pain, dyspnea, paresthesias, weakness, nausea, dizziness, lightheadedness.      PAST MEDICAL & SURGICAL HISTORY:  Heart disease      Hypertension      Skin cancer      Presence of automatic cardioverter/defibrillator (AICD)      History of open heart surgery      FHx: skin cancer          Allergies  No Known Allergies      SH:  Home Medications:  amiodarone 100 mg oral tablet: 1 tab(s) orally once a day (14 Nov 2022 08:01)  Aspirin Low Dose 81 mg oral tablet, chewable: 1 tab(s) orally once a day (14 Nov 2022 08:01)  cyanocobalamin 100 mcg oral tablet: 1 tab(s) orally once a day (14 Nov 2022 08:01)  levothyroxine 25 mcg (0.025 mg) oral tablet: 1 tab(s) orally once a day (14 Nov 2022 08:01)  Melatonin 5 mg oral tablet: 1 tab(s) orally once a day (at bedtime) (14 Nov 2022 08:01)  memantine 5 mg oral tablet: 1 tab(s) orally once a day (14 Nov 2022 08:01)  omeprazole 20 mg oral delayed release capsule: 1 cap(s) orally once a day (14 Nov 2022 08:01)  Vitamin B12 50 mcg oral tablet: 2 tab(s) orally once a day (14 Nov 2022 08:01)    Advanced Directives: Full Code     ROS:  [ ] A ten-point review of systems was otherwise negative except as noted.  [X] Due to altered mental status/intubation, subjective information were not able to be obtained from the patient. History was obtained, to the extent possible, from review of the chart and collateral sources of information.      CURRENT MEDICATIONS:   --------------------------------------------------------------------------------------  Neurologic Medications  acetaminophen     Tablet .. 650 milliGRAM(s) Oral every 6 hours  divalproex  milliGRAM(s) Oral every 12 hours  divalproex  milliGRAM(s) Oral at bedtime  gabapentin 100 milliGRAM(s) Oral three times a day  melatonin 5 milliGRAM(s) Oral at bedtime  memantine 5 milliGRAM(s) Oral daily    Respiratory Medications    Cardiovascular Medications  aMIOdarone    Tablet 100 milliGRAM(s) Oral daily  lisinopril 10 milliGRAM(s) Oral daily  metoprolol tartrate 25 milliGRAM(s) Oral two times a day    Gastrointestinal Medications  cyanocobalamin 1000 MICROGram(s) Oral daily  lactated ringers. 1000 milliLiter(s) IV Continuous <Continuous>  pantoprazole    Tablet 40 milliGRAM(s) Oral before breakfast  senna 2 Tablet(s) Oral at bedtime    Genitourinary Medications    Hematologic/Oncologic Medications  heparin   Injectable 5000 Unit(s) SubCutaneous every 8 hours    Antimicrobial/Immunologic Medications  cephalexin 500 milliGRAM(s) Oral every 12 hours    Endocrine/Metabolic Medications  levothyroxine 25 MICROGram(s) Oral daily    Topical/Other Medications  lidocaine   4% Patch 1 Patch Transdermal daily    --------------------------------------------------------------------------------------    Vital Signs Last 24 Hrs  T(C): 34 (22 Nov 2022 16:20), Max: 34 (22 Nov 2022 16:20)  T(F): 93.2 (22 Nov 2022 16:20), Max: 93.2 (22 Nov 2022 16:20)  HR: 82 (22 Nov 2022 15:29) (78 - 82)  BP: 175/82 (22 Nov 2022 15:29) (164/77 - 175/82)  BP(mean): --  RR: 20 (22 Nov 2022 15:29) (18 - 20)  SpO2: 97% (22 Nov 2022 15:29) (97% - 100%)    Parameters below as of 22 Nov 2022 15:29  Patient On (Oxygen Delivery Method): room air      I&O's Detail    I&O's Summary      LABS:                          9.8    6.76  )-----------( 191      ( 22 Nov 2022 10:40 )             29.5     11-22    146  |  107  |  27<H>  ----------------------------<  103<H>  6.0<HH>   |  27  |  1.3    Ca    8.9      22 Nov 2022 10:40    TPro  6.7  /  Alb  2.6<L>  /  TBili  1.2  /  DBili  x   /  AST  24  /  ALT  7   /  AlkPhos  82  11-22    LIVER FUNCTIONS - ( 22 Nov 2022 10:40 )  Alb: 2.6 g/dL / Pro: 6.7 g/dL / ALK PHOS: 82 U/L / ALT: 7 U/L / AST: 24 U/L / GGT: x           PT/INR - ( 22 Nov 2022 10:40 )   PT: 14.00 sec;   INR: 1.22 ratio         PTT - ( 22 Nov 2022 10:40 )  PTT:35.4 sec  CARDIAC MARKERS ( 22 Nov 2022 10:40 )  x     / 0.02 ng/mL / 60 U/L / x     / x            EXAM:  General/Neuro  GCS: 14  Exam: Normal, NAD, alert, oriented x 2 (to person and time; this is his baseline as per patient's daughter), no focal deficits, strength 5/5 in upper and lower bilateral extremities with sensation intact.    Respiratory  Exam: Lungs clear to auscultation bilaterally, Normal expansion/effort. Pulling 750cc on IS.    Cardiovascular  Exam: S1, S2.  A-fib.   Cardiac Rhythm: A-fib  ECHO (5/9/22): 35-40%, GIDD, severely enlarged left atrium    GI  Exam: Abdomen soft, Non-tender, Non-distended.    Current Diet: regular DASH (w/ aspiration precautions and head of bed elevation)    Extremities  Exam: Extremities warm and dry. Pulses: palpable radial and PT pulses bilaterally. No deformities palpated. Compartments soft in upper and lower bilateral extremities.     Derm:  Exam: Good skin turgor, no skin breakdown.      Tubes/Lines/Drains  ***  - PIVs           ***Delay in consult note due to clinical duties***    SICU Consultation Note    HPI:  89yM w/ PMHx of Squamous cell carcinoma s/p resection and local flap with Dr. Doe 11/14/22, CAD s/p CABG 25 years ago, Afib and sick sinus syndrome s/p biventricular pacemaker and ICD, HTN, CKD3, GERD, HFrEF, lymphoma being followed by patient's oncologist seen as Trauma consult s/p unwitnessed fall at nursing home .  Trauma assessment in ED: ABCs intact , GCS 14 , AAOx0. Patient brought in by EMS, daughter at bedside to give history. Patient has history of frequent falls and is not on anticoagulation for that reason. She states his baseline mental status is AAOx1-2, but he seemed more confused to her today. CTH negative, CT-Cspine negative. CT chest/abdomen/pelvis revealing R acromion fracture seen on CT vs. distal clavicular fracture on XR with associated 4x3 cm hematoma, b/l moderate-large pleural effusions, acute nondisplaced fractures of b/l 11-12 posterior ribs, age-indeterminate right 5-6 lateral rib fractures and left 10-11 lateral rib fractures, age-indeterminate L2-L4 compression deformities.   Non-traumatic injuries identified: heterogenous b/l renal cortical soft tissue masses suspicious of neoplasm, thickening of left anteriolateral bladder wall, cholelithiasis, fat-containing left inguinal hernia. (22 Nov 2022 14:42)    Patient was seen by orthopedic surgery team for right acromion fx, no orthopedic intervention at this time, f/u outpatient with Dr. Jeter. Patient seen by neurosurgery for L2-L4 vertebral body fractures, no acute neurosurgical intervention at this time, lumbar brace for support when OOB.    Pt seen bedside in ED, patient does not appear to be in any acute distress. Pt is a/o x2 (to person and time; this is baseline as per patient's daughter). Vital signs: HR 76, /82, SpO2 >98% on room air. Patient denies any acute pain, dyspnea, paresthesias, weakness, nausea, dizziness, lightheadedness.      PAST MEDICAL & SURGICAL HISTORY:  Heart disease      Hypertension      Skin cancer      Presence of automatic cardioverter/defibrillator (AICD)      History of open heart surgery      FHx: skin cancer          Allergies  No Known Allergies      SH:  Home Medications:  amiodarone 100 mg oral tablet: 1 tab(s) orally once a day (14 Nov 2022 08:01)  Aspirin Low Dose 81 mg oral tablet, chewable: 1 tab(s) orally once a day (14 Nov 2022 08:01)  cyanocobalamin 100 mcg oral tablet: 1 tab(s) orally once a day (14 Nov 2022 08:01)  levothyroxine 25 mcg (0.025 mg) oral tablet: 1 tab(s) orally once a day (14 Nov 2022 08:01)  Melatonin 5 mg oral tablet: 1 tab(s) orally once a day (at bedtime) (14 Nov 2022 08:01)  memantine 5 mg oral tablet: 1 tab(s) orally once a day (14 Nov 2022 08:01)  omeprazole 20 mg oral delayed release capsule: 1 cap(s) orally once a day (14 Nov 2022 08:01)  Vitamin B12 50 mcg oral tablet: 2 tab(s) orally once a day (14 Nov 2022 08:01)    Advanced Directives: Full Code     ROS:  [ ] A ten-point review of systems was otherwise negative except as noted.  [X] Due to altered mental status/intubation, subjective information were not able to be obtained from the patient. History was obtained, to the extent possible, from review of the chart and collateral sources of information.      CURRENT MEDICATIONS:   --------------------------------------------------------------------------------------  Neurologic Medications  acetaminophen     Tablet .. 650 milliGRAM(s) Oral every 6 hours  divalproex  milliGRAM(s) Oral every 12 hours  divalproex  milliGRAM(s) Oral at bedtime  gabapentin 100 milliGRAM(s) Oral three times a day  melatonin 5 milliGRAM(s) Oral at bedtime  memantine 5 milliGRAM(s) Oral daily    Respiratory Medications    Cardiovascular Medications  aMIOdarone    Tablet 100 milliGRAM(s) Oral daily  lisinopril 10 milliGRAM(s) Oral daily  metoprolol tartrate 25 milliGRAM(s) Oral two times a day    Gastrointestinal Medications  cyanocobalamin 1000 MICROGram(s) Oral daily  lactated ringers. 1000 milliLiter(s) IV Continuous <Continuous>  pantoprazole    Tablet 40 milliGRAM(s) Oral before breakfast  senna 2 Tablet(s) Oral at bedtime    Genitourinary Medications    Hematologic/Oncologic Medications  heparin   Injectable 5000 Unit(s) SubCutaneous every 8 hours    Antimicrobial/Immunologic Medications  cephalexin 500 milliGRAM(s) Oral every 12 hours    Endocrine/Metabolic Medications  levothyroxine 25 MICROGram(s) Oral daily    Topical/Other Medications  lidocaine   4% Patch 1 Patch Transdermal daily    --------------------------------------------------------------------------------------    Vital Signs Last 24 Hrs  T(C): 34 (22 Nov 2022 16:20), Max: 34 (22 Nov 2022 16:20)  T(F): 93.2 (22 Nov 2022 16:20), Max: 93.2 (22 Nov 2022 16:20)  HR: 82 (22 Nov 2022 15:29) (78 - 82)  BP: 175/82 (22 Nov 2022 15:29) (164/77 - 175/82)  BP(mean): --  RR: 20 (22 Nov 2022 15:29) (18 - 20)  SpO2: 97% (22 Nov 2022 15:29) (97% - 100%)    Parameters below as of 22 Nov 2022 15:29  Patient On (Oxygen Delivery Method): room air      I&O's Detail    I&O's Summary      LABS:                          9.8    6.76  )-----------( 191      ( 22 Nov 2022 10:40 )             29.5     11-22    146  |  107  |  27<H>  ----------------------------<  103<H>  6.0<HH>   |  27  |  1.3    Ca    8.9      22 Nov 2022 10:40    TPro  6.7  /  Alb  2.6<L>  /  TBili  1.2  /  DBili  x   /  AST  24  /  ALT  7   /  AlkPhos  82  11-22    LIVER FUNCTIONS - ( 22 Nov 2022 10:40 )  Alb: 2.6 g/dL / Pro: 6.7 g/dL / ALK PHOS: 82 U/L / ALT: 7 U/L / AST: 24 U/L / GGT: x           PT/INR - ( 22 Nov 2022 10:40 )   PT: 14.00 sec;   INR: 1.22 ratio         PTT - ( 22 Nov 2022 10:40 )  PTT:35.4 sec  CARDIAC MARKERS ( 22 Nov 2022 10:40 )  x     / 0.02 ng/mL / 60 U/L / x     / x            EXAM:  General/Neuro  GCS: 14  Exam: Normal, NAD, alert, oriented x 2 (to person and time; this is his baseline as per patient's daughter), no focal deficits, strength 5/5 in upper and lower bilateral extremities with sensation intact.    Respiratory  Exam: Lungs clear to auscultation bilaterally, Normal expansion/effort. Pulling 750cc on IS.    Cardiovascular  Exam: S1, S2.   Cardiac Rhythm: ventricular-paced rhythm  ECHO (5/9/22): 35-40%, GIDD, severely enlarged left atrium  EKG: ventricular-paced rhythm    GI  Exam: Abdomen soft, Non-tender, Non-distended.    Current Diet: regular DASH (w/ aspiration precautions and head of bed elevation)    Extremities  Exam: Extremities warm and dry. Pulses: palpable radial and PT pulses bilaterally. No deformities palpated. Compartments soft in upper and lower bilateral extremities.     Derm:  Exam: Good skin turgor, no skin breakdown.      Tubes/Lines/Drains  ***  - PIVs

## 2022-11-22 NOTE — ED PROVIDER NOTE - CLINICAL SUMMARY MEDICAL DECISION MAKING FREE TEXT BOX
89 year old male with CAD s/p CABG, afib/SSS s/p Biv ICD medtronic,, HTN, CKD, and GERD BIBA from assisted living after he fell. Unwitnessed fall. Pt was found on the floor. Pt is unable to provide history. On exam, pt in NAD, AAOx0, head NC/AT, CN II-XII intact, PEERL, EOMi, (+) sutures in place over left cheekbone (s/p resection of SCC by Dr. Doe 11/14/22), neck (-) midline tenderness, lungs CTA B/L, CV S1S2 regular, abdomen soft/NT/ND/(+)BS, pelvis stable, ext (-) edema, moving all extremities, (+) abrasions to left forearm and left knee. Trauma consulted after pt was found to have R acromion fracture vs. distal clavicular fracture on XR with associated 4x3 cm hematoma, b/l moderate-large pleural effusions, acute nondisplaced fractures of b/l 11-12 posterior ribs, age-indeterminate right 5-6 lateral rib fractures and left 10-11 lateral rib fractures, age-indeterminate L2-L4 compression deformities. Will admit.

## 2022-11-22 NOTE — CONSULT NOTE ADULT - ASSESSMENT
IMPRESSION: Rehab of multitrauma / s/p fall with multiple viktor rib fx, left acromion fx    PRECAUTIONS: [   ] Cardiac  [   ] Respiratory  [   ] Seizures [   ] Contact Isolation  [   ] Droplet Isolation  [   ] Other    Weight Bearing Status: NWB RUE until ortho clarify wb status     RECOMMENDATION:    Out of Bed to Chair     DVT/Decubiti Prophylaxis    REHAB PLAN:     [  x  ] Bedside P/T 3-5 times a week   [  x  ]   Bedside O/T  2-3 times a week             [    ] Speech Therapy               [    ]  No Rehab Therapy Indicated   Conditioning/ROM                                    ADL  Bed Mobility                                               Conditioning/ROM  Transfers                                                     Bed Mobility  Sitting /Standing Balance                         Transfers                                        Gait Training                                               Sitting/Standing Balance  Stair Training [   ]Applicable                    Home equipment Eval                                                                        Splinting  [   ] Only      GOALS:   ADL   [    ]   Independent                    Transfers  [  x  ] Independent                          Ambulation  [    ] Independent     [    x ] With device                            [ x   ]  CG                                                         [    ]  CG                                                                  [    ] CG                            [    ] Min A                                                   [    ] Min A                                                              [    ] Min  A          DISCHARGE PLAN:   [    ]  Good candidate for Intensive Rehabilitation/Hospital based                                             Will tolerate 3hrs Intensive Rehab Daily                                       [  x   ]  Short Term Rehab in Skilled Nursing Facility                          vs             [  x   ]  Home with Outpatient or  services with 24hr care                                         [     ]  Possible Candidate for Intensive Hospital based Rehab                                        IMPRESSION: Rehab of multitrauma / s/p fall with multiple viktor rib fx, left acromion fx / covid-19 +    PRECAUTIONS: [   ] Cardiac  [x  ] Respiratory  [   ] Seizures [  x ] Contact Isolation  [  x ] Droplet Isolation  [   ] Other    Weight Bearing Status: NWB RUE until ortho clarify wb status     RECOMMENDATION:    Out of Bed to Chair     DVT/Decubiti Prophylaxis    REHAB PLAN:     [  x  ] Bedside P/T 3-5 times a week   [  x  ]   Bedside O/T  2-3 times a week             [    ] Speech Therapy               [    ]  No Rehab Therapy Indicated   Conditioning/ROM                                    ADL  Bed Mobility                                               Conditioning/ROM  Transfers                                                     Bed Mobility  Sitting /Standing Balance                         Transfers                                        Gait Training                                               Sitting/Standing Balance  Stair Training [   ]Applicable                    Home equipment Eval                                                                        Splinting  [   ] Only      GOALS:   ADL   [    ]   Independent                    Transfers  [  x  ] Independent                          Ambulation  [    ] Independent     [    x ] With device                            [ x   ]  CG                                                         [    ]  CG                                                                  [    ] CG                            [    ] Min A                                                   [    ] Min A                                                              [    ] Min  A          DISCHARGE PLAN:   [    ]  Good candidate for Intensive Rehabilitation/Hospital based                                             Will tolerate 3hrs Intensive Rehab Daily                                       [  x   ]  Short Term Rehab in Skilled Nursing Facility                          vs             [  x   ]  Home with Outpatient or VN services with 24hr care                                         [     ]  Possible Candidate for Intensive Hospital based Rehab

## 2022-11-22 NOTE — CONSULT NOTE ADULT - SUBJECTIVE AND OBJECTIVE BOX
89yM w/ PMHx of Squamous cell carcinoma s/p resection and local flap with Dr. Doe 11/14/22, CAD s/p CABG 25 years ago, Afib and sick sinus syndrome s/p biventricular pacemaker and ICD, HTN, CKD3, GERD, HFrEF  s/p unwitnessed fall at nursing home .  Trauma assessment in ED: ABCs intact , GCS 14 , AAOx0. Patient brought in by EMS, daughter at bedside to give history. Patient has history of frequent falls and is not on anticoagulation for that reason. She states his  baseline mental status is AAOx1-2, but he seemed more confused to her today. CTH negative, CT-Cspine negative. CT chest/abdomen/pelvis revealing R acromion fracture with associated 4x3 cm hematoma, b/l moderate-large pleural effusions, acute nondisplaced fractures of b/l 11-12 posterior ribs, age-indeterminate right 5-6 lateral rib fractures and left 10-11 lateral rib fractures, age-indeterminate L2-L4 compression deformities.   Non-traumatic injuries identified: heterogenous b/l renal cortical soft tissue masses suspicious of neoplasm, thickening of left anteriolateral bladder wall, cholelithiasis, fat-containing left inguinal hernia.  pt seen and examined in ED and denied any pain to the back or legs.      Vital Signs Last 24 Hrs  T(C): 34 (22 Nov 2022 16:20), Max: 34 (22 Nov 2022 16:20)  T(F): 93.2 (22 Nov 2022 16:20), Max: 93.2 (22 Nov 2022 16:20)  HR: 82 (22 Nov 2022 15:29) (78 - 82)  BP: 175/82 (22 Nov 2022 15:29) (164/77 - 175/82)  BP(mean): --  RR: 20 (22 Nov 2022 15:29) (18 - 20)  SpO2: 97% (22 Nov 2022 15:29) (97% - 100%)    Parameters below as of 22 Nov 2022 15:29  Patient On (Oxygen Delivery Method): room air        MEDICATIONS  (STANDING):  acetaminophen     Tablet .. 650 milliGRAM(s) Oral every 6 hours  aMIOdarone    Tablet 100 milliGRAM(s) Oral daily  cephalexin 500 milliGRAM(s) Oral every 12 hours  cyanocobalamin 1000 MICROGram(s) Oral daily  divalproex  milliGRAM(s) Oral every 12 hours  divalproex  milliGRAM(s) Oral at bedtime  gabapentin 100 milliGRAM(s) Oral three times a day  heparin   Injectable 5000 Unit(s) SubCutaneous every 8 hours  lactated ringers. 1000 milliLiter(s) (50 mL/Hr) IV Continuous <Continuous>  levothyroxine 25 MICROGram(s) Oral daily  lidocaine   4% Patch 1 Patch Transdermal daily  lisinopril 10 milliGRAM(s) Oral daily  melatonin 5 milliGRAM(s) Oral at bedtime  memantine 5 milliGRAM(s) Oral daily  metoprolol tartrate 25 milliGRAM(s) Oral two times a day  pantoprazole    Tablet 40 milliGRAM(s) Oral before breakfast  senna 2 Tablet(s) Oral at bedtime      Exam:    awake, alert and oriented x2 to name and year, confuse to place               follows simple commands               no tenderness on palpation to cervical thoracic and lumbar spine region               moves upper and lower extremities spontaneously and with good strength                    CT abdomen:    BONES/SOFT TISSUES: Partially imaged fracture fragments adjacent to the   right acromion, with associated 4.1 x 2.3 x 3.2 cm hematoma, suspicious   for acute or subacute fracture. Acute nondisplaced fractures of the   bilateral 11th and 12th posterior ribs, adjacent to the costovertebral   junction. Age-indeterminate fractures of the right 5th and 6thlateral   ribs and left 10th and 11th lateral ribs. Degenerative changes of the   spine with mild age-indeterminate compression deformities of the L2-L4   vertebral bodies.

## 2022-11-22 NOTE — H&P ADULT - NSHPPHYSICALEXAM_GEN_ALL_CORE
Secondary Survey:   General: NAD, AAOx1  HEENT: Normocephalic, atraumatic, EOMI, PEERLA. left facial surgical wound with minimal pus drainage, skin edges without erythema or edema  Neck: Soft, midline trachea. no c-spine tenderness  Chest: No chest wall tenderness, no subcutaneous emphysema   Cardiac: S1, S2, RRR  Respiratory: decreased breath sounds b/l bases, no increased work of breathing  Abdomen: Soft, non-distended, non-tender, no rebound, no guarding.  Groin: Normal appearing, pelvis stable   Ext:  Moving b/l upper and lower extremities. Palpable Radial b/l UE, b/l DP palpable in LE.   Back: No T/L/S spine tenderness, No palpable runoff/stepoff/deformity  Rectal: No aracely blood Secondary Survey:   General: NAD, AAOx1  HEENT: Normocephalic, atraumatic, EOMI, PEERLA. left facial surgical wound with minimal pus drainage, skin edges without erythema or edema  Neck: Soft, midline trachea. no c-spine tenderness  Chest: No chest wall tenderness, no subcutaneous emphysema   Cardiac: S1, S2, RRR  Respiratory: decreased breath sounds b/l bases, no increased work of breathing  Abdomen: Soft, non-distended, non-tender, no rebound, no guarding.  Groin: Normal appearing, pelvis stable   Ext:  Moving b/l upper and lower extremities. Palpable Radial b/l UE, b/l DP palpable in LE.  Left forearm abrasion, Left knee abrasion  Back: No T/L/S spine tenderness, No palpable runoff/stepoff/deformity  Rectal: No aracely blood

## 2022-11-22 NOTE — H&P ADULT - ASSESSMENT
89yM w/ PMHx of Squamous cell carcinoma s/p resection and local flap with Dr. Doe 11/14/22, CAD s/p CABG 25 years ago, Afib and sick sinus syndrome s/p biventricular pacemaker and ICD, HTN, CKD3, GERD, HFrEF seen as Trauma consult s/p unwitnessed fall at nursing home .  Trauma assessment in ED: ABCs intact , GCS 14 , AAOx0.    CTH negative, CT-Cspine negative. CT chest/abdomen/pelvis revealing R acromion fracture with associated 4x3 cm hematoma, b/l moderate-large pleural effusions, acute nondisplaced fractures of b/l 11-12 posterior ribs, age-indeterminate right 5-6 lateral rib fractures and left 10-11 lateral rib fractures, age-indeterminate L2-L4 compression deformities.   Non-traumatic injuries identified: heterogenous b/l renal cortical soft tissue masses suspicious of neoplasm, thickening of left anteriolateral bladder wall, cholelithiasis, fat-containing left inguinal hernia.    Per daughter: patient is DNR/DNI and does not want invasive procedures done    Neuro:  -Baseline AAOx1-2 per daughter  -AAOx0 on arrival, now AAOx1  -follows commands  -GCS improved from 14 to 15  -CTH/CT-Cspine negative    #frailty  -4    #Age-indeterminate L2-L4 compression deformity  -F/U NSx    #dementia  -continue memantine 5 mg QD    Resp:  #oxygenation  -Saturating 100% on RA    #imaging  -CXR with b/l basilar opacities  -CT Chest: b/l moderate to large pleural effusions-->According to radiologist these effusions are not blood despite consistent [place] units (artifact from patient's arms darkens fluid on CT scan)    #b/l rib fractures  -acute nondisplaced fx's b/l 11-12  -Age indeterminate fracture R 5-6 lateral ribs, left 10-11 lateral ribs  -IS:  -Daily AM CXR    Cards:  #imaging  -ECHO pending  -ECHO 5/2022: EF 35-40%, G1DD  -EKG: complete heart block, ventricular paced rhythm    #Hx A fib and SSS s/p biventricular pacemaker and ICD  -EPS for interrogation  -Continue amiodarone    #labs  -troponins 0.02> f/u repeats  -ECHO pending    #Hx CAD s/p CABG 20 yrs ago  -Continue benazepril  -Continue metoprolol 25 BID  -Hold ASA81 until cleared by attending    #HTN  -Lisinopril 10 mg QD  -Benazepril 10 mg QD reportedly taking per daughter, holding in light of two ACE inhibitors    GI  #diet  -regular    #GERD  -Omeprazole 20 continued      #urine output  -voiding spontaneously    #fluids  -LR @ 75  -lactate 2.4-->1.8    #labs  -Cr 1.3, baseline 1.2  -K+ 6 hemolyzed-->repeat 4.7    Heme  #R acromion hematoma  -Hgb 9.8>  -DVT PPX: LVX  -Hold ASA    MSK  -R acromion fracture w/ associated hematoma--> f/u ortho, trend CBC    #dispo  -PT/Rehab    Disposition pending results of above labs and imaging  Above plan discussed with Trauma attending, Dr. Mack, patient, patient family, and ED team  --------------------------------------------------------------------------------------  11-22-22 @ 13:48 89yM w/ PMHx of Squamous cell carcinoma s/p resection and local flap with Dr. Doe 11/14/22, CAD s/p CABG 25 years ago, Afib and sick sinus syndrome s/p biventricular pacemaker and ICD, HTN, CKD3, GERD, HFrEF seen as Trauma consult s/p unwitnessed fall at nursing home .  Trauma assessment in ED: ABCs intact , GCS 14 , AAOx0.    CTH negative, CT-Cspine negative. CT chest/abdomen/pelvis revealing R acromion fracture with associated 4x3 cm hematoma, b/l moderate-large pleural effusions, acute nondisplaced fractures of b/l 11-12 posterior ribs, age-indeterminate right 5-6 lateral rib fractures and left 10-11 lateral rib fractures, age-indeterminate L2-L4 compression deformities.   Non-traumatic injuries identified: heterogenous b/l renal cortical soft tissue masses suspicious of neoplasm, thickening of left anteriolateral bladder wall, cholelithiasis, fat-containing left inguinal hernia.    Per daughter: patient is DNR/DNI and does not want invasive procedures done    Neuro:  -Baseline AAOx1-2 per daughter  -AAOx0 on arrival, now AAOx1  -follows commands  -GCS improved from 14 to 15  -CTH/CT-Cspine negative    #frailty  -4    #Age-indeterminate L2-L4 compression deformity  -F/U NSx    #dementia  -continue memantine 5 mg QD    Resp:  #oxygenation  -Saturating 100% on RA    #imaging  -CXR with b/l basilar opacities  -CT Chest: b/l moderate to large pleural effusions-->According to radiologist these effusions are not blood despite consistent [place] units (artifact from patient's arms darkens fluid on CT scan)    #b/l rib fractures  -acute nondisplaced fx's b/l 11-12  -Age indeterminate fracture R 5-6 lateral ribs, left 10-11 lateral ribs  -IS:  -Daily AM CXR    Cards:  #imaging  -ECHO pending  -ECHO 5/2022: EF 35-40%, G1DD  -EKG: complete heart block, ventricular paced rhythm    #Hx A fib and SSS s/p biventricular pacemaker and ICD  -EPS for interrogation  -Continue amiodarone    #labs  -troponins 0.02> f/u repeats  -ECHO pending    #Hx CAD s/p CABG 20 yrs ago  -Continue benazepril  -Continue metoprolol 25 BID  -Hold ASA81 until cleared by attending    #HTN  -Lisinopril 10 mg QD  -Benazepril 10 mg QD reportedly taking per daughter, holding in light of two ACE inhibitors    GI  #diet  -regular    #GERD  -Omeprazole 20 continued      #urine output  -voiding spontaneously    #fluids  -LR @ 75  -lactate 2.4-->1.8    #labs  -Cr 1.3, baseline 1.2  -K+ 6 hemolyzed-->repeat 4.7    Heme  #R acromion hematoma  -Hgb 9.8>  -DVT PPX: LVX  -Hold ASA    MSK  -R acromion fracture w/ associated hematoma--> f/u ortho, trend CBC    #dispo  -PT/Rehab    #abrasion left forearm and left knee  -Xeroform dressing    Disposition pending results of above labs and imaging  Above plan discussed with Trauma attending, Dr. Mack, patient, patient family, and ED team  --------------------------------------------------------------------------------------  11-22-22 @ 13:48 89yM w/ PMHx of Squamous cell carcinoma s/p resection and local flap with Dr. Doe 11/14/22, CAD s/p CABG 25 years ago, Afib and sick sinus syndrome s/p biventricular pacemaker and ICD, HTN, CKD3, GERD, HFrEF seen as Trauma consult s/p unwitnessed fall at nursing home .  Trauma assessment in ED: ABCs intact , GCS 14 , AAOx0.    CTH negative, CT-Cspine negative. CT chest/abdomen/pelvis revealing R acromion fracture with associated 4x3 cm hematoma, b/l moderate-large pleural effusions, acute nondisplaced fractures of b/l 11-12 posterior ribs, age-indeterminate right 5-6 lateral rib fractures and left 10-11 lateral rib fractures, age-indeterminate L2-L4 compression deformities.   Non-traumatic injuries identified: heterogenous b/l renal cortical soft tissue masses suspicious of neoplasm, thickening of left anteriolateral bladder wall, cholelithiasis, fat-containing left inguinal hernia.    Per daughter: patient is DNR/DNI and does not want invasive procedures done    Neuro:  -Baseline AAOx1-2 per daughter  -AAOx0 on arrival, now AAOx1  -follows commands  -GCS improved from 14 to 15  -CTH/CT-Cspine negative    #frailty  -4    #Age-indeterminate L2-L4 compression deformity  -F/U NSx    #dementia  -continue memantine 5 mg QD    Resp:  #oxygenation  -Saturating 100% on RA    #imaging  -CXR with b/l basilar opacities  -CT Chest: b/l moderate to large pleural effusions-->According to radiologist these effusions are not blood despite consistent [place] units (artifact from patient's arms darkens fluid on CT scan)    #b/l rib fractures  -acute nondisplaced fx's b/l 11-12  -Age indeterminate fracture R 5-6 lateral ribs, left 10-11 lateral ribs  -IS: 750  -Daily AM CXR    Cards:  #imaging  -ECHO pending  -ECHO 5/2022: EF 35-40%, G1DD  -EKG: complete heart block, ventricular paced rhythm    #Hx A fib and SSS s/p biventricular pacemaker and ICD  -EPS for interrogation  -Continue amiodarone    #labs  -troponins 0.02> f/u repeats  -ECHO pending    #Hx CAD s/p CABG 20 yrs ago  -Continue benazepril  -Continue metoprolol 25 BID  -Hold ASA81 until cleared by attending    #HTN  -Lisinopril 10 mg QD  -Benazepril 10 mg QD reportedly taking per daughter, holding in light of two ACE inhibitors    GI  #diet  -regular    #GERD  -Omeprazole 20 continued      #urine output  -voiding spontaneously    #fluids  -LR @ 75  -lactate 2.4-->1.8    #labs  -Cr 1.3, baseline 1.2  -K+ 6 hemolyzed-->repeat 4.7    Heme  #R acromion hematoma  -Hgb 9.8>  -DVT PPX: LVX  -Hold ASA    MSK  -R acromion fracture w/ associated hematoma--> f/u ortho, trend CBC    #dispo  -PT/Rehab    #abrasion left forearm and left knee  -Xeroform dressing    Disposition pending results of above labs and imaging  Above plan discussed with Trauma attending, Dr. Mack, patient, patient family, and ED team  --------------------------------------------------------------------------------------  11-22-22 @ 13:48 89yM w/ PMHx of Squamous cell carcinoma s/p resection and local flap with Dr. Doe 11/14/22, CAD s/p CABG 25 years ago, Afib and sick sinus syndrome s/p biventricular pacemaker and ICD, HTN, CKD3, GERD, HFrEF seen as Trauma consult s/p unwitnessed fall at nursing home .  Trauma assessment in ED: ABCs intact , GCS 14 , AAOx0.    CTH negative, CT-Cspine negative. CT chest/abdomen/pelvis revealing R acromion fracture with associated 4x3 cm hematoma, b/l moderate-large pleural effusions, acute nondisplaced fractures of b/l 11-12 posterior ribs, age-indeterminate right 5-6 lateral rib fractures and left 10-11 lateral rib fractures, age-indeterminate L2-L4 compression deformities.   Non-traumatic injuries identified: heterogenous b/l renal cortical soft tissue masses suspicious of neoplasm, thickening of left anteriolateral bladder wall, cholelithiasis, fat-containing left inguinal hernia.    Per daughter: patient is DNR/DNI and does not want invasive procedures done    Neuro:  -Baseline AAOx1-2 per daughter  -AAOx0 on arrival, now AAOx1  -follows commands  -GCS improved from 14 to 15  -CTH/CT-Cspine negative    #frailty  -4    #Age-indeterminate L2-L4 compression deformity  -F/U NSx    #dementia  -continue memantine 5 mg QD    Resp:  #oxygenation  -Saturating 100% on RA    #imaging  -CXR with b/l basilar opacities  -CT Chest: b/l moderate to large pleural effusions-->According to radiologist these effusions are not blood despite consistent [place] units (artifact from patient's arms darkens fluid on CT scan)    #b/l rib fractures  -acute nondisplaced fx's b/l 11-12  -Age indeterminate fracture R 5-6 lateral ribs, left 10-11 lateral ribs  -IS: 750  -Daily AM CXR    Cards:  #imaging  -ECHO pending  -ECHO 5/2022: EF 35-40%, G1DD  -EKG: complete heart block, ventricular paced rhythm    #Hx A fib and SSS s/p biventricular pacemaker and ICD  -EPS for interrogation  -Continue amiodarone    #labs  -troponins 0.02> f/u repeats  -ECHO pending    #Hx CAD s/p CABG 20 yrs ago  -Continue benazepril  -Continue metoprolol 25 BID  -Hold ASA81 until cleared by attending    #HTN  -Lisinopril 10 mg QD  -Benazepril 10 mg QD reportedly taking per daughter, holding in light of two ACE inhibitors    GI  #diet  -regular    #GERD  -Omeprazole 20 continued      #urine output  -voiding spontaneously    #fluids  -LR @ 75  -lactate 2.4-->1.8    #labs  -Cr 1.3, baseline 1.2  -K+ 6 hemolyzed-->repeat 4.7    Heme  #R acromion hematoma  -Hgb 9.8>  -DVT PPX: LVX  -Hold ASA    MSK  -R acromion fracture w/ associated hematoma versus distal R clavicular fracture on XR--> f/u ortho, trend CBC    #dispo  -PT/Rehab    #abrasion left forearm and left knee  -Xeroform dressing    Disposition pending results of above labs and imaging  Above plan discussed with Trauma attending, Dr. Mack, patient, patient family, and ED team  --------------------------------------------------------------------------------------  11-22-22 @ 13:48 89yM w/ PMHx of Squamous cell carcinoma s/p resection and local flap with Dr. Doe 11/14/22, CAD s/p CABG 25 years ago, Afib and sick sinus syndrome s/p biventricular pacemaker and ICD, HTN, CKD3, GERD, HFrEF seen as Trauma consult s/p unwitnessed fall at nursing home .  Trauma assessment in ED: ABCs intact , GCS 14 , AAOx0.    CTH negative, CT-Cspine negative. CT chest/abdomen/pelvis revealing R acromion fracture with associated 4x3 cm hematoma, b/l moderate-large pleural effusions, acute nondisplaced fractures of b/l 11-12 posterior ribs, age-indeterminate right 5-6 lateral rib fractures and left 10-11 lateral rib fractures, age-indeterminate L2-L4 compression deformities.   Non-traumatic injuries identified: heterogenous b/l renal cortical soft tissue masses suspicious of neoplasm, thickening of left anteriolateral bladder wall, cholelithiasis, fat-containing left inguinal hernia.    Per daughter: patient is DNR/DNI and does not want invasive procedures done    Neuro:  -Baseline AAOx1-2 per daughter  -AAOx0 on arrival, now AAOx1  -follows commands  -GCS improved from 14 to 15  -CTH/CT-Cspine negative    #frailty  -4    #Age-indeterminate L2-L4 compression deformity  -F/U NSx    #dementia  -continue memantine 5 mg QD    Resp:  #oxygenation  -Saturating 100% on RA    #imaging  -CXR with b/l basilar opacities  -CT Chest: b/l moderate to large pleural effusions-->According to radiologist these effusions are not blood despite consistent [place] units (artifact from patient's arms darkens fluid on CT scan)    #b/l rib fractures  -acute nondisplaced fx's b/l 11-12  -Age indeterminate fracture R 5-6 lateral ribs, left 10-11 lateral ribs  -IS: 750  -Daily AM CXR    Cards:  #imaging  -ECHO pending  -ECHO 5/2022: EF 35-40%, G1DD  -EKG: complete heart block, ventricular paced rhythm    #Hx A fib and SSS s/p biventricular pacemaker and ICD  -EPS for interrogation  -Continue amiodarone    #labs  -troponins 0.02> f/u repeats  -ECHO pending    #Hx CAD s/p CABG 20 yrs ago  -Continue benazepril  -Continue metoprolol 25 BID  -Hold ASA81 until cleared by attending    #HTN  -Lisinopril 10 mg QD  -Benazepril 10 mg QD reportedly taking per daughter, holding in light of two ACE inhibitors    GI  #diet  -regular    #GERD  -Omeprazole 20 continued      #urine output  -voiding spontaneously  -Urinalysis negative    #fluids  -LR @ 75  -lactate 2.4-->1.8    #labs  -Cr 1.3, baseline 1.2  -K+ 6 hemolyzed-->repeat 4.7    Heme  #R acromion hematoma  -Hgb 9.8>  -DVT PPX: LVX  -Hold ASA    MSK  -R acromion fracture w/ associated hematoma versus distal R clavicular fracture on XR--> f/u ortho, trend CBC    #dispo  -PT/Rehab    #abrasion left forearm and left knee  -Xeroform dressing    Disposition pending results of above labs and imaging  Above plan discussed with Trauma attending, Dr. Mack, patient, patient family, and ED team  --------------------------------------------------------------------------------------  11-22-22 @ 13:48

## 2022-11-22 NOTE — ED ADULT NURSE NOTE - OBJECTIVE STATEMENT
Pt presents s/p fall at assisted living. As per EMS, fall was witnessed by roommate. No LOC, not head trauma, not on anticoagulation. Patient a&ox2.

## 2022-11-22 NOTE — ED PROVIDER NOTE - CARE PLAN
1 Principal Discharge DX:	Multiple fractures of ribs  Secondary Diagnosis:	Fracture of acromion of scapula  Secondary Diagnosis:	Pleural effusion  Secondary Diagnosis:	Renal mass  Secondary Diagnosis:	S/P skin biopsy

## 2022-11-22 NOTE — ED ADULT NURSE NOTE - NSIMPLEMENTINTERV_GEN_ALL_ED
Implemented All Fall with Harm Risk Interventions:  [place] to call system. Call bell, personal items and telephone within reach. Instruct patient to call for assistance. Room bathroom lighting operational. Non-slip footwear when patient is off stretcher. Physically safe environment: no spills, clutter or unnecessary equipment. Stretcher in lowest position, wheels locked, appropriate side rails in place. Provide visual cue, wrist band, yellow gown, etc. Monitor gait and stability. Monitor for mental status changes and reorient to person, place, and time. Review medications for side effects contributing to fall risk. Reinforce activity limits and safety measures with patient and family. Provide visual clues: red socks.

## 2022-11-22 NOTE — CONSULT NOTE ADULT - SUBJECTIVE AND OBJECTIVE BOX
TRAUMA ACTIVATION LEVEL: CONSULT  ACTIVATED BY: ED  INTUBATED: NO      MECHANISM OF INJURY:   [] Blunt     [] MVC	  [x] Fall	  [] Pedestrian Struck	  [] Motorcycle     [] Assault     [] Bicycle collision    [] Sports injury    [] Penetrating    [] Gun Shot Wound      [] Stab Wound    GCS: 14 	E: 4	V: 5	M: 5    HPI:    89yM w/ PMHx of Squamous cell carcinoma s/p resection and local flap with Dr. Doe 11/14/22, CAD s/p CABG 25 years ago, Afib and sick sinus syndrome s/p biventricular pacemaker and ICD, HTN, CKD3, GERD, HFrEF seen as Trauma consult s/p unwitnessed fall at nursing home .  Trauma assessment in ED: ABCs intact , GCS 14 , AAOx0. Patient brought in by EMS, daughter at bedside to give history. Patient has history of frequent falls and is not on anticoagulation for that reason. She states his  baseline mental status is AAOx1-2, but he seemed more confused to her today. CTH negative, CT-Cspine negative. CT chest/abdomen/pelvis revealing R acromion fracture with associated 4x3 cm hematoma, b/l moderate-large pleural effusions, acute nondisplaced fractures of b/l 11-12 posterior ribs, age-indeterminate right 5-6 lateral rib fractures and left 10-11 lateral rib fractures, age-indeterminate L2-L4 compression deformities.   Non-traumatic injuries identified: heterogenous b/l renal cortical soft tissue masses suspicious of neoplasm, thickening of left anteriolateral bladder wall, cholelithiasis, fat-containing left inguinal hernia.    PAST MEDICAL & SURGICAL HISTORY:  Heart disease      Hypertension      Skin cancer      Presence of automatic cardioverter/defibrillator (AICD)      History of open heart surgery      FHx: skin cancer          Allergies    No Known Allergies    Intolerances        Home Medications:  amiodarone 100 mg oral tablet: 1 tab(s) orally once a day (14 Nov 2022 08:01)  Aspirin Low Dose 81 mg oral tablet, chewable: 1 tab(s) orally once a day (14 Nov 2022 08:01)  cyanocobalamin 100 mcg oral tablet: 1 tab(s) orally once a day (14 Nov 2022 08:01)  levothyroxine 25 mcg (0.025 mg) oral tablet: 1 tab(s) orally once a day (14 Nov 2022 08:01)  Melatonin 5 mg oral tablet: 1 tab(s) orally once a day (at bedtime) (14 Nov 2022 08:01)  memantine 5 mg oral tablet: 1 tab(s) orally once a day (14 Nov 2022 08:01)  omeprazole 20 mg oral delayed release capsule: 1 cap(s) orally once a day (14 Nov 2022 08:01)  Vitamin B12 50 mcg oral tablet: 2 tab(s) orally once a day (14 Nov 2022 08:01)      ROS: 10-system review is otherwise negative except HPI above.      Primary Survey:    A - airway intact  B - bilateral breath sounds and good chest rise  C - palpable pulses in all extremities  D - GCS 15 on arrival, E[facility]AV[facility]A  Exposure obtained    Vital Signs Last 24 Hrs  T(C): --  T(F): --  HR: 78 (22 Nov 2022 09:52) (78 - 78)  BP: 164/77 (22 Nov 2022 09:52) (164/77 - 164/77)  BP(mean): --  RR: 18 (22 Nov 2022 09:52) (18 - 18)  SpO2: 100% (22 Nov 2022 09:52) (100% - 100%)    Parameters below as of 22 Nov 2022 09:52  Patient On (Oxygen Delivery Method): room air        Secondary Survey:   General: NAD, AAOx1  HEENT: Normocephalic, atraumatic, EOMI, PEERLA. left facial surgical wound with minimal pus drainage, skin edges without erythema or edema  Neck: Soft, midline trachea. no c-spine tenderness  Chest: No chest wall tenderness, no subcutaneous emphysema   Cardiac: S1, S2, RRR  Respiratory: decreased breath sounds b/l bases, no increased work of breathing  Abdomen: Soft, non-distended, non-tender, no rebound, no guarding.  Groin: Normal appearing, pelvis stable   Ext:  Moving b/l upper and lower extremities. Palpable Radial b/l UE, b/l DP palpable in LE.   Back: No T/L/S spine tenderness, No palpable runoff/stepoff/deformity  Rectal: No aracely blood      ACCESS / DEVICES:  [X] Peripheral IV      Labs:  CAPILLARY BLOOD GLUCOSE      POCT Blood Glucose.: 98 mg/dL (22 Nov 2022 10:04)                          9.8    6.76  )-----------( 191      ( 22 Nov 2022 10:40 )             29.5       Auto Neutrophil %: 70.7 % (11-22-22 @ 10:40)  Auto Immature Granulocyte %: 0.3 % (11-22-22 @ 10:40)    11-22    146  |  107  |  27<H>  ----------------------------<  103<H>  6.0<HH>   |  27  |  1.3      Calcium, Total Serum: 8.9 mg/dL (11-22-22 @ 10:40)      LFTs:             6.7  | 1.2  | 24       ------------------[82      ( 22 Nov 2022 10:40 )  2.6  | x    | 7           Lipase:14     Amylase:x         Blood Gas Venous - Lactate: 1.70 mmol/L (11-22-22 @ 13:17)  Blood Gas Venous - Lactate: 1.80 mmol/L (11-22-22 @ 12:43)  Lactate, Blood: 2.4 mmol/L (11-22-22 @ 10:40)      Coags:     14.00  ----< 1.22    ( 22 Nov 2022 10:40 )     35.4        CARDIAC MARKERS ( 22 Nov 2022 10:40 )  x     / 0.02 ng/mL / 60 U/L / x     / x            Alcohol, Blood: <10 mg/dL (11-22-22 @ 10:40)            Alcohol, Blood: <10 mg/dL (11-22-22 @ 10:40)

## 2022-11-22 NOTE — ED PROVIDER NOTE - PHYSICAL EXAMINATION
CONSTITUTIONAL: NAD.   SKIN: L surgical wound noted, small amount of purulent drainage from wound. L forearm and L knee abrasion noted   HEAD: NCAT except surgical wound noted above   EYES: PERRLA, EOMI  NECK: No posterior midline cervical tenderness  CARD: +S1, S2 no murmurs, gallops, or rubs. Regular rate and rhythm. Radial, DP, PT 2+/4 bilaterally  RESP: LCTAB. No wheezes, rales or rhonchi.  ABD: Abdomen soft, nontender, nondistended.  NEURO: A&Ox1. Limited neuro exam but no focal deficits noted  MSK: TTP over R shoulder and humerus. No other TTP in UEs and LEs. No chest wall tenderness or crepitus  BACK: No posterior midline tenderness

## 2022-11-22 NOTE — CONSULT NOTE ADULT - ASSESSMENT
ASSESSMENT: Patient is a 89y old m presents after unwitnessed fall with AMS. Patient is s/p SCC excision with local flap reconstruction 1 week ago. Plastic surgery consulted for wound evaluation and recommendations    PLAN:    - No acute concern for infection of surgical site at this time  - recommend daily xeroform to incision   - Patient taking Keflex 500 BID from office with end date of 11/26 - would continue while in house   - Plan discussed with Attending, Dr. Nader Mata MD, PhD  Plastic Surgery Resident, PGY4  [facility]: [phone]  [facility]: [id number]  Available on [employer] Teams

## 2022-11-22 NOTE — CONSULT NOTE ADULT - ASSESSMENT
89yM w/ PMHx of Squamous cell carcinoma s/p resection and local flap with Dr. Doe 11/14/22, CAD s/p CABG 25 years ago, Afib and sick sinus syndrome s/p biventricular pacemaker and ICD, HTN, CKD3, GERD, HFrEF seen as Trauma consult s/p unwitnessed fall at nursing home .  Trauma assessment in ED: ABCs intact , GCS 14 , AAOx0.    CTH negative, CT-Cspine negative. CT chest/abdomen/pelvis revealing R acromion fracture with associated 4x3 cm hematoma, b/l moderate-large pleural effusions, acute nondisplaced fractures of b/l 11-12 posterior ribs, age-indeterminate right 5-6 lateral rib fractures and left 10-11 lateral rib fractures, age-indeterminate L2-L4 compression deformities.   Non-traumatic injuries identified: heterogenous b/l renal cortical soft tissue masses suspicious of neoplasm, thickening of left anteriolateral bladder wall, cholelithiasis, fat-containing left inguinal hernia.    Per daughter: patient is DNR/DNI and does not want invasive procedures done    Neuro:  -Baseline AAOx1-2 per daughter  -AAOx0 on arrival, now AAOx1  -follows commands  -GCS improved from 14 to 15  -CTH/CT-Cspine negative    #frailty  -4    #Age-indeterminate L2-L4 compression deformity  -F/U NSx    #dementia  -continue memantine 5 mg QD    Resp:  #oxygenation  -Saturating 100% on RA    #imaging  -CXR with b/l basilar opacities  -CT Chest: b/l moderate to large pleural effusions-->According to radiologist these effusions are not blood despite consistent [place] units (artifact from patient's arms darkens fluid on CT scan)    #b/l rib fractures  -acute nondisplaced fx's b/l 11-12  -Age indeterminate fracture R 5-6 lateral ribs, left 10-11 lateral ribs  -IS:  -Daily AM CXR    Cards:  #imaging  -ECHO pending  -ECHO 5/2022: EF 35-40%, G1DD  -EKG: complete heart block, ventricular paced rhythm    #Hx A fib and SSS s/p biventricular pacemaker and ICD  -EPS for interrogation  -Continue amiodarone    #labs  -troponins 0.02> f/u repeats  -ECHO pending    #Hx CAD s/p CABG 20 yrs ago  -Continue benazepril  -Continue metoprolol 25 BID  -Hold ASA81 until cleared by attending    #HTN  -Lisinopril 10 mg QD  -Benazepril 10 mg QD reportedly taking per daughter, holding in light of two ACE inhibitors    GI  #diet  -regular    #GERD  -Omeprazole 20 continued      #urine output  -voiding spontaneously    #fluids  -LR @ 75  -lactate 2.4-->1.8    #labs  -Cr 1.3, baseline 1.2    Heme  #R acromion hematoma  -Hgb 9.8>  -DVT PPX: LVX  -Hold ASA    MSK  -R acromion fracture w/ associated hematoma--> f/u ortho, trend CBC    #dispo  -PT/Rehab    Disposition pending results of above labs and imaging  Above plan discussed with Trauma attending, Dr. Mack, patient, patient family, and ED team  --------------------------------------------------------------------------------------  11-22-22 @ 13:48

## 2022-11-22 NOTE — ED PROVIDER NOTE - ATTENDING CONTRIBUTION TO CARE
89 year old male with CAD s/p CABG, afib/SSS s/p Biv ICD medtronic,, HTN, CKD, and GERD BIBA from assisted living after he fell. 89 year old male with CAD s/p CABG, afib/SSS s/p Biv ICD medtronic,, HTN, CKD, and GERD BIBA from assisted living after he fell. Unwitnessed fall. Pt was found on the floor. Pt is unable to provide history. On exam, pt in NAD, AAOx0, head NC/AT, CN II-XII intact, PEERL, EOMi, (+) sutures in place over left cheekbone (s/p resection of SCC by Dr. Doe 11/14/22), neck (-) midline tenderness, lungs CTA B/L, CV S1S2 regular, abdomen soft/NT/ND/(+)BS, pelvis stable, ext (-) edema, moving all extremities, (+) abrasions to left forearm and left knee. Trauma consulted after pt was found to have R acromion fracture vs. distal clavicular fracture on XR with associated 4x3 cm hematoma, b/l moderate-large pleural effusions, acute nondisplaced fractures of b/l 11-12 posterior ribs, age-indeterminate right 5-6 lateral rib fractures and left 10-11 lateral rib fractures, age-indeterminate L2-L4 compression deformities. Will admit.

## 2022-11-22 NOTE — H&P ADULT - ATTENDING COMMENTS
patient seen. full trauma workup was performed. patient's daughter is with him. has advanced dementia. daughter say she wants him DNR and DNI and no major interventions (like chest tubes). has multiple ribs broken, some with indeterminate age. admit to SICU fo rrespiratory status monitoring.

## 2022-11-22 NOTE — CONSULT NOTE ADULT - ASSESSMENT
A/P :  s/p fall with age indeterminate compression fracture of L2-L4 vertebral bodies           pain management           no neurosurgical intervention indicated           lumbar brace for support when OOB

## 2022-11-22 NOTE — CONSULT NOTE ADULT - SUBJECTIVE AND OBJECTIVE BOX
GENERAL SURGERY CONSULT NOTE  --------------------------------------------------------------------------------------------    Patient is a 89y old  Male who presents with a chief complaint of Fall    HPI: 89 yo male w/ PMH of CAD, HTN, afib and SSS s/p AICD, CKD, dementia presents for fall.  Unwitnessed fall, per EMS roommate saw patient on the floor, was down for around 1 hour, unknown mechanism of fall, unknown head trauma, unknown LOC, -blood thinners.  Pt A&Ox0 here, unable to obtain further HPI.    Patient has been being treated in the office as an outpatient s/p surgical excision of large left cheek SCC with local flap reconstruction. Plastic Surgery consulted for left cheek wound evaluation and recommendations     Patient denies fevers/chills, denies lightheadedness/dizziness, denies SOB/chest pain, denies nausea/vomiting, denies constipation/diarrhea.     ROS: 10-system review is otherwise negative except HPI above.      PAST MEDICAL & SURGICAL HISTORY:  Heart disease      Hypertension      Skin cancer      Presence of automatic cardioverter/defibrillator (AICD)      History of open heart surgery      FHx: skin cancer        FAMILY HISTORY:    [] Family history not pertinent as reviewed with the patient and family    SOCIAL HISTORY:      ALLERGIES: No Known Allergies      HOME MEDICATIONS: ***    CURRENT MEDICATIONS  MEDICATIONS (STANDING): cefepime   IVPB 2000 milliGRAM(s) IV Intermittent once  vancomycin  IVPB. 1000 milliGRAM(s) IV Intermittent once    MEDICATIONS (PRN):  --------------------------------------------------------------------------------------------    Vitals:   T(C): --  HR: 78 (11-22-22 @ 09:52) (78 - 78)  BP: 164/77 (11-22-22 @ 09:52) (164/77 - 164/77)  RR: 18 (11-22-22 @ 09:52) (18 - 18)  SpO2: 100% (11-22-22 @ 09:52) (100% - 100%)  CAPILLARY BLOOD GLUCOSE      POCT Blood Glucose.: 98 mg/dL (22 Nov 2022 10:04)    CAPILLARY BLOOD GLUCOSE      POCT Blood Glucose.: 98 mg/dL (22 Nov 2022 10:04)      Height (cm): 190.5 (11-22 @ 09:52)    PHYSICAL EXAM: ***  General: NAD, Lying in bed comfortably  Neuro: A+Ox0  SKIN: left cheek surgical site with some fibrinous exudate over superior portion of incision. Area of concern measures approximately 0.5x1.5cm with no expressible discharge, or active discharge seen. Copious fibrinous material. no malodor. Several prolene sutures intact. other portions of incision well healed, no induration appreciated.   --------------------------------------------------------------------------------------------    LABS  CBC (11-22 @ 10:40)                              9.8<L>                         6.76    )----------------(  191        70.7  % Neutrophils, 13.8<L>% Lymphocytes, ANC: 4.78                                29.5<L>    BMP (11-22 @ 10:40)             146     |  107     |  27<H> 		Ca++ --      Ca 8.9                ---------------------------------( 103<H>		Mg --                 6.0<HH>  |  27      |  1.3   			Ph --        LFTs (11-22 @ 10:40)      TPro 6.7 / Alb 2.6<L> / TBili 1.2 / DBili -- / AST 24 / ALT 7 / AlkPhos 82    Coags (11-22 @ 10:40)  aPTT 35.4 / INR 1.22 / PT 14.00<H>    Cardiac Markers (11-22 @ 10:40)     HSTrop: -- / CKMB: -- / CK: 60    ABG (11-22 @ 10:40)      /  /  /  /  / %     Lactate:  2.4<H>    VBG (11-22 @ 13:17)     7.37 / 56<H> / 37 / 32<H> / 6.1<H> / 61.2%     Lactate: 1.70  VBG (11-22 @ 12:43)     7.37 / 57<H> / 31 / 33<H> / 6.6<H> / 48.9%     Lactate: 1.80    --------------------------------------------------------------------------------------------    MICROBIOLOGY      --------------------------------------------------------------------------------------------    IMAGING  reviewed

## 2022-11-22 NOTE — CONSULT NOTE ADULT - ASSESSMENT
Assessment & Plan  89y Male w/ acute right acromion fx (vs. displaced distal right clavicular fx) and associated [id number] hematoma, age-indeterminate right 5-6th/ left 10-11th lateral rib fxs, age-indeterminate L2-L4 vertebral body fractures, b/l 11-12th posterior rib fxs, b/l pleural effusions, b/l renal cortical soft tissue masses    NEURO/HEENT:  #Pain  - Tylenol 650mg q6  - Gabapentin 100 mg q8  - Lidocaine patch  #Vascular dementia  - c/w memantine  - c/w depakote  #Age-indeterminate L2-L4 vertebral body fractures  - Neurosurgery consult --> no neurosurgical intervention, lumbar brace for support when OOB  # (11/14) s/p excision of left facial squamous cell carcinoma with reconstruction with local flap elevation and closure  - plastics consult for drainage from surgical site  - no acute concern for infection of surgical site at this time  - recommend daily xeroform to incision   - Patient taking Keflex 500 BID from office with end date of 11/26 - would continue while in house         RESP:   #age-indeterminate right 5-6th/ left 10-11th lateral rib fxs, age-indeterminate L2-L4 vertebral body fractures, b/l 11-12th posterior rib fxs  - pulling 750cc on IS  - AM CXR  - monitor SpO2      CARDS:   #HTN  - c/w home lisinopril 10mg qd  #A-fib  - c/w amiodarone 100mg PO qd  - c/w metoprolol 25mg q12 PO  #HFrEF  - repeat echo pending  - Echo (5/9/22): EF 35-40%, G1DD, severely enlarged LA      GI/NUTR:   #Diet  - regular DASH  #GERD  - PPI  #Bowel regimen  - senna      /RENAL:   #urine output in critically ill  - f/u if void  #IV fluids  - LR @ 50 cc/hr  - IVL once tolerating diet    Labs:          BUN/Cr- 27/1.3  -->          Electrolytes-Na 146 // K 6.0 (severely hemolyzed) // Mg -- //  Phos -- (11-22 @ 10:40)  - f/u repeat BMP 8PM  #CKD stage 3  - lactate 1.7      HEME/ONC:     #DVT prophylaxis    -heparin   Injectable  , SCDs    Labs: Hb/Hct:  9.8/29.5  -->                      Plts:  191  -->                 PTT/INR:  35.4/1.22  --->       ID:  WBC- 6.76  --->>  Temp trend- 24hrs T(F): 93.2 (11-22 @ 16:20), Max: 93.2 (11-22 @ 16:20)  Current antibiotics-cephalexin 500 every 12 hours      ENDO:  - Glucose, Serum: 103 (11-22 @ 10:40)  #Hypothyroidism  - c/w home synthroid     LINES/DRAINS:  PIV    Advanced Directives: Presumed Full Code    HCP/Emergency Contact-    Indication for SICU: respiratory monitoring in the setting of multiple rib fractures and incentive spirometry 750mL    DISPO:    SICU. Case discussed with Dr. Fuller. Assessment & Plan  89y Male w/ acute right acromion fx (vs. displaced distal right clavicular fx) and associated [id number] hematoma, age-indeterminate right 5-6th/ left 10-11th lateral rib fxs, age-indeterminate L2-L4 vertebral body fractures, b/l 11-12th posterior rib fxs, b/l pleural effusions, b/l renal cortical soft tissue masses    NEURO/HEENT:  #Pain  - Tylenol 650mg q6  - Gabapentin 100 mg q8  - Lidocaine patch  #Vascular dementia  - c/w memantine  - c/w depakote  #Age-indeterminate L2-L4 vertebral body fractures  - Neurosurgery consult --> no neurosurgical intervention, lumbar brace for support when OOB  # (11/14) s/p excision of left facial squamous cell carcinoma with reconstruction with local flap elevation and closure  - plastics consult for drainage from surgical site  - no acute concern for infection of surgical site at this time  - recommend daily xeroform to incision   - Patient taking Keflex 500 BID from office with end date of 11/26 - would continue while in house         RESP:   #age-indeterminate right 5-6th/ left 10-11th lateral rib fxs, age-indeterminate L2-L4 vertebral body fractures, b/l 11-12th posterior rib fxs  - pulling 750cc on IS  - AM CXR  - monitor SpO2      CARDS:   #HTN  - c/w home lisinopril 10mg qd  #A-fib  - c/w amiodarone 100mg PO qd  - c/w metoprolol 25mg q12 PO  #HFrEF  - repeat echo pending  - Echo (5/9/22): EF 35-40%, G1DD, severely enlarged LA      GI/NUTR:   #Diet  - regular DASH  #GERD  - PPI  #Bowel regimen  - senna      /RENAL:   #urine output in critically ill  - f/u if void  #IV fluids  - LR @ 50 cc/hr  - IVL once tolerating diet    Labs:          BUN/Cr- 27/1.3  -->          Electrolytes-Na 146 // K 6.0 (severely hemolyzed) // Mg -- //  Phos -- (11-22 @ 10:40)  - f/u repeat BMP 8PM  #CKD stage 3  - lactate 1.7      HEME/ONC:     #DVT prophylaxis    -heparin   Injectable  , SCDs    Labs: Hb/Hct:  9.8/29.5  -->                      Plts:  191  -->                 PTT/INR:  35.4/1.22  --->       ID:  WBC- 6.76  --->>  Temp trend- 24hrs T(F): 93.2 (11-22 @ 16:20), Max: 93.2 (11-22 @ 16:20)  Current antibiotics-cephalexin 500 every 12 hours  #COVID positive  - asymptomatic      ENDO:  - Glucose, Serum: 103 (11-22 @ 10:40)  #Hypothyroidism  - c/w home synthroid     LINES/DRAINS:  PIV    Advanced Directives: Presumed Full Code    HCP/Emergency Contact-    Indication for SICU: respiratory monitoring in the setting of multiple rib fractures and incentive spirometry 750mL    DISPO:    SICU. Case discussed with Dr. Fuller. Assessment & Plan  89y Male w/ acute right acromion fx (vs. displaced distal right clavicular fx) and associated [id number] hematoma, age-indeterminate right 5-6th/ left 10-11th lateral rib fxs, age-indeterminate L2-L4 vertebral body fractures, b/l 11-12th posterior rib fxs, b/l pleural effusions, b/l renal cortical soft tissue masses    NEURO/HEENT:  #Pain  - Tylenol 650mg q6  - Gabapentin 100 mg q8  - Lidocaine patch  #Vascular dementia  - c/w memantine  - c/w depakote  #Age-indeterminate L2-L4 vertebral body fractures  - Neurosurgery consult --> no neurosurgical intervention, lumbar brace for support when OOB  # (11/14) s/p excision of left facial squamous cell carcinoma with reconstruction with local flap elevation and closure  - plastics consult for drainage from surgical site  - no acute concern for infection of surgical site at this time  - recommend daily xeroform to incision   - Patient taking Keflex 500 BID from office with end date of 11/26 - would continue while in house         RESP:   #age-indeterminate right 5-6th/ left 10-11th lateral rib fxs, age-indeterminate L2-L4 vertebral body fractures, b/l 11-12th posterior rib fxs  - pulling 750cc on IS  - AM CXR  - monitor SpO2      CARDS:   #HTN  - c/w home lisinopril 10mg qd  #A-fib w/ sick sinus syndrome (s/p biventricular pacemaker placement; last interrogated 11/14/22, no events)  - c/w amiodarone 100mg PO qd  - c/w metoprolol 25mg q12 PO  #HFrEF  - repeat echo pending  - Echo (5/9/22): EF 35-40%, G1DD, severely enlarged LA      GI/NUTR:   #Diet  - regular DASH  #GERD  - PPI  #Bowel regimen  - senna      /RENAL:   #urine output in critically ill  - f/u if void  #IV fluids  - LR @ 50 cc/hr  - IVL once tolerating diet    Labs:          BUN/Cr- 27/1.3  -->          Electrolytes-Na 146 // K 6.0 (severely hemolyzed) // Mg -- //  Phos -- (11-22 @ 10:40)  - f/u repeat BMP 8PM  #CKD stage 3  - lactate 1.7      HEME/ONC:     #DVT prophylaxis    -heparin   Injectable  , SCDs    Labs: Hb/Hct:  9.8/29.5  -->                      Plts:  191  -->                 PTT/INR:  35.4/1.22  --->       ID:  WBC- 6.76  --->>  Temp trend- 24hrs T(F): 93.2 (11-22 @ 16:20), Max: 93.2 (11-22 @ 16:20)  Current antibiotics-cephalexin 500 every 12 hours  #COVID positive  - asymptomatic      ENDO:  - Glucose, Serum: 103 (11-22 @ 10:40)  #Hypothyroidism  - c/w home synthroid     LINES/DRAINS:  PIV    Advanced Directives: Presumed Full Code    HCP/Emergency Contact-    Indication for SICU: respiratory monitoring in the setting of multiple rib fractures and incentive spirometry 750mL    DISPO:    SICU. Case discussed with Dr. Fuller.

## 2022-11-22 NOTE — ED ADULT NURSE NOTE - NS ED NURSE PRESS ULCER STAGE 12
Complex Repair And Graft Additional Text (Will Appearing After The Standard Complex Repair Text): The complex repair was not sufficient to completely close the primary defect. The remaining additional defect was repaired with the graft mentioned below. stage II

## 2022-11-22 NOTE — H&P ADULT - HISTORY OF PRESENT ILLNESS
TRAUMA ACTIVATION LEVEL: CONSULT  ACTIVATED BY: ED  INTUBATED: NO      MECHANISM OF INJURY:   [] Blunt     [] MVC	  [x] Fall	  [] Pedestrian Struck	  [] Motorcycle     [] Assault     [] Bicycle collision    [] Sports injury    [] Penetrating    [] Gun Shot Wound      [] Stab Wound    GCS: 14 	E: 4	V: 5	M: 5    HPI:    89yM w/ PMHx of Squamous cell carcinoma s/p resection and local flap with Dr. Doe 11/14/22, CAD s/p CABG 25 years ago, Afib and sick sinus syndrome s/p biventricular pacemaker and ICD, HTN, CKD3, GERD, HFrEF seen as Trauma consult s/p unwitnessed fall at nursing home .  Trauma assessment in ED: ABCs intact , GCS 14 , AAOx0. Patient brought in by EMS, daughter at bedside to give history. Patient has history of frequent falls and is not on anticoagulation for that reason. She states his  baseline mental status is AAOx1-2, but he seemed more confused to her today. CTH negative, CT-Cspine negative. CT chest/abdomen/pelvis revealing R acromion fracture with associated 4x3 cm hematoma, b/l moderate-large pleural effusions, acute nondisplaced fractures of b/l 11-12 posterior ribs, age-indeterminate right 5-6 lateral rib fractures and left 10-11 lateral rib fractures, age-indeterminate L2-L4 compression deformities.   Non-traumatic injuries identified: heterogenous b/l renal cortical soft tissue masses suspicious of neoplasm, thickening of left anteriolateral bladder wall, cholelithiasis, fat-containing left inguinal hernia. TRAUMA ACTIVATION LEVEL: CONSULT  ACTIVATED BY: ED  INTUBATED: NO      MECHANISM OF INJURY:   [] Blunt     [] MVC	  [x] Fall	  [] Pedestrian Struck	  [] Motorcycle     [] Assault     [] Bicycle collision    [] Sports injury    [] Penetrating    [] Gun Shot Wound      [] Stab Wound    GCS: 14 	E: 4	V: 5	M: 5    HPI:    89yM w/ PMHx of Squamous cell carcinoma s/p resection and local flap with Dr. Doe 11/14/22, CAD s/p CABG 25 years ago, Afib and sick sinus syndrome s/p biventricular pacemaker and ICD, HTN, CKD3, GERD, HFrEF, lymphoma being followed by patient's oncologist seen as Trauma consult s/p unwitnessed fall at nursing home .  Trauma assessment in ED: ABCs intact , GCS 14 , AAOx0. Patient brought in by EMS, daughter at bedside to give history. Patient has history of frequent falls and is not on anticoagulation for that reason. She states his  baseline mental status is AAOx1-2, but he seemed more confused to her today. CTH negative, CT-Cspine negative. CT chest/abdomen/pelvis revealing R acromion fracture with associated 4x3 cm hematoma, b/l moderate-large pleural effusions, acute nondisplaced fractures of b/l 11-12 posterior ribs, age-indeterminate right 5-6 lateral rib fractures and left 10-11 lateral rib fractures, age-indeterminate L2-L4 compression deformities.   Non-traumatic injuries identified: heterogenous b/l renal cortical soft tissue masses suspicious of neoplasm, thickening of left anteriolateral bladder wall, cholelithiasis, fat-containing left inguinal hernia. TRAUMA ACTIVATION LEVEL: CONSULT  ACTIVATED BY: ED  INTUBATED: NO      MECHANISM OF INJURY:   [] Blunt     [] MVC	  [x] Fall	  [] Pedestrian Struck	  [] Motorcycle     [] Assault     [] Bicycle collision    [] Sports injury    [] Penetrating    [] Gun Shot Wound      [] Stab Wound    GCS: 14 	E: 4	V: 5	M: 5    HPI:    89yM w/ PMHx of Squamous cell carcinoma s/p resection and local flap with Dr. Doe 11/14/22, CAD s/p CABG 25 years ago, Afib and sick sinus syndrome s/p biventricular pacemaker and ICD, HTN, CKD3, GERD, HFrEF, lymphoma being followed by patient's oncologist seen as Trauma consult s/p unwitnessed fall at nursing home .  Trauma assessment in ED: ABCs intact , GCS 14 , AAOx0. Patient brought in by EMS, daughter at bedside to give history. Patient has history of frequent falls and is not on anticoagulation for that reason. She states his  baseline mental status is AAOx1-2, but he seemed more confused to her today. CTH negative, CT-Cspine negative. CT chest/abdomen/pelvis revealing R acromion fracture seen on CT vs. distal clavicular fracture on XR with associated 4x3 cm hematoma, b/l moderate-large pleural effusions, acute nondisplaced fractures of b/l 11-12 posterior ribs, age-indeterminate right 5-6 lateral rib fractures and left 10-11 lateral rib fractures, age-indeterminate L2-L4 compression deformities.   Non-traumatic injuries identified: heterogenous b/l renal cortical soft tissue masses suspicious of neoplasm, thickening of left anteriolateral bladder wall, cholelithiasis, fat-containing left inguinal hernia.

## 2022-11-22 NOTE — CONSULT NOTE ADULT - SUBJECTIVE AND OBJECTIVE BOX
HPI:    89yM w/ PMHx of Squamous cell carcinoma s/p resection and local flap with Dr. Doe 22, CAD s/p CABG 25 years ago, Afib and sick sinus syndrome s/p biventricular pacemaker and ICD, HTN, CKD3, GERD, HFrEF, lymphoma being followed by patient's oncologist seen as Trauma consult s/p unwitnessed fall at nursing home .  Trauma assessment in ED: ABCs intact , GCS 14 , AAOx0. Patient brought in by EMS, daughter at bedside to give history. Patient has history of frequent falls and is not on anticoagulation for that reason. She states his  baseline mental status is AAOx1-2, but he seemed more confused to her today. CTH negative, CT-Cspine negative. CT chest/abdomen/pelvis revealing R acromion fracture with associated 4x3 cm hematoma, b/l moderate-large pleural effusions, acute nondisplaced fractures of b/l 11-12 posterior ribs, age-indeterminate right 5-6 lateral rib fractures and left 10-11 lateral rib fractures, age-indeterminate L2-L4 compression deformities.   Non-traumatic injuries identified: heterogenous b/l renal cortical soft tissue masses suspicious of neoplasm, thickening of left anteriolateral bladder wall, cholelithiasis, fat-containing left inguinal hernia.           PAST MEDICAL & SURGICAL HISTORY:  Heart disease      Hypertension      Skin cancer      Presence of automatic cardioverter/defibrillator (AICD)      History of open heart surgery      FHx: skin cancer          Hospital Course:    TODAY'S SUBJECTIVE & REVIEW OF SYMPTOMS:     Constitutional WNL   Cardio WNL   Resp WNL   GI WNL  Heme WNL  Endo WNL  Skin WNL  MSK pain  Neuro WNL  Cognitive WNL  Psych WNL      MEDICATIONS  (STANDING):  acetaminophen     Tablet .. 650 milliGRAM(s) Oral every 6 hours  aMIOdarone    Tablet 100 milliGRAM(s) Oral daily  cephalexin 500 milliGRAM(s) Oral every 12 hours  cyanocobalamin 1000 MICROGram(s) Oral daily  divalproex  milliGRAM(s) Oral every 12 hours  divalproex  milliGRAM(s) Oral at bedtime  gabapentin 100 milliGRAM(s) Oral three times a day  heparin   Injectable 5000 Unit(s) SubCutaneous every 8 hours  lactated ringers. 1000 milliLiter(s) (50 mL/Hr) IV Continuous <Continuous>  levothyroxine 25 MICROGram(s) Oral daily  lidocaine   4% Patch 1 Patch Transdermal daily  lisinopril 10 milliGRAM(s) Oral daily  melatonin 5 milliGRAM(s) Oral at bedtime  memantine 5 milliGRAM(s) Oral daily  metoprolol tartrate 25 milliGRAM(s) Oral two times a day  pantoprazole    Tablet 40 milliGRAM(s) Oral before breakfast  senna 2 Tablet(s) Oral at bedtime    MEDICATIONS  (PRN):      FAMILY HISTORY:      Allergies    No Known Allergies    Intolerances        SOCIAL HISTORY:    [  ] Etoh  [  ] Smoking  [  ] Substance abuse     Home Environment:  [   ] Home Alone  [   ] Lives with Family  [  x ] Home Health Aid - 24hr care    Dwelling:  [ x  ] Apartment  [   ] Private House  [   ] Adult Home  [   ] Skilled Nursing Facility      [   ] Short Term  [   ] Long Term  [   ] Stairs       Elevator [  x ]    FUNCTIONAL STATUS PTA: (Check all that apply)  Ambulation: [    ]Independent    [ x  ] Dependent     [   ] Non-Ambulatory  Assistive Device: [   ] SA Cane  [   ]  Q Cane  [ x  ] Walker  [   ]  Wheelchair  ADL : [   ] Independent  [  x  ]  Dependent       Vital Signs Last 24 Hrs  T(C): 34 (2022 16:20), Max: 34 (2022 16:20)  T(F): 93.2 (2022 16:20), Max: 93.2 (2022 16:20)  HR: 82 (2022 15:29) (78 - 82)  BP: 175/82 (2022 15:29) (164/77 - 175/82)  BP(mean): --  RR: 20 (2022 15:29) (18 - 20)  SpO2: 97% (2022 15:29) (97% - 100%)    Parameters below as of 2022 15:29  Patient On (Oxygen Delivery Method): room air          PHYSICAL EXAM: Awake & Alert  GENERAL: NAD  HEAD:  Normocephalic  CHEST/LUNG: Clear   HEART: S1S2+  ABDOMEN: Soft, Nontender  EXTREMITIES:  no calf tenderness    NERVOUS SYSTEM:  Cranial Nerves 2-12 intact [   ] Abnormal  [   ]  ROM: WFL all extremities [   ]  Abnormal [  x ]limited RUE  Motor Strength: WFL all extremities  [   ]  Abnormal [ x  ]limited RUE  Sensation: intact to light touch [x   ] Abnormal [   ]    FUNCTIONAL STATUS:  Bed Mobility: Independent [   ]  Supervision [   ]  Needs Assistance [  x ]  N/A [   ]  Transfers: Independent [   ]  Supervision [   ]  Needs Assistance [   ]  N/A [   ]   Ambulation: Independent [   ]  Supervision [   ]  Needs Assistance [   ]  N/A [   ]  ADL: Independent [   ] Requires Assistance [   ] N/A [   ]      LABS:                        9.8    6.76  )-----------( 191      ( 2022 10:40 )             29.5         146  |  107  |  27<H>  ----------------------------<  103<H>  6.0<HH>   |  27  |  1.3    Ca    8.9      2022 10:40    TPro  6.7  /  Alb  2.6<L>  /  TBili  1.2  /  DBili  x   /  AST  24  /  ALT  7   /  AlkPhos  82  11    PT/INR - ( 2022 10:40 )   PT: 14.00 sec;   INR: 1.22 ratio         PTT - ( 2022 10:40 )  PTT:35.4 sec  Urinalysis Basic - ( 2022 14:03 )    Color: Yellow / Appearance: Clear / S.030 / pH: x  Gluc: x / Ketone: Negative  / Bili: Negative / Urobili: 12 mg/dL   Blood: x / Protein: 30 mg/dL / Nitrite: Negative   Leuk Esterase: Negative / RBC: 3 /HPF / WBC 4 /HPF   Sq Epi: x / Non Sq Epi: 4 /HPF / Bacteria: Negative        RADIOLOGY & ADDITIONAL STUDIES:

## 2022-11-22 NOTE — H&P ADULT - NSHPLABSRESULTS_GEN_ALL_CORE
.  LABS:                         9.8    6.76  )-----------( 191      ( 2022 10:40 )             29.5         146  |  107  |  27<H>  ----------------------------<  103<H>  6.0<HH>   |  27  |  1.3    Ca    8.9      2022 10:40    TPro  6.7  /  Alb  2.6<L>  /  TBili  1.2  /  DBili  x   /  AST  24  /  ALT  7   /  AlkPhos  82      PT/INR - ( 2022 10:40 )   PT: 14.00 sec;   INR: 1.22 ratio         PTT - ( 2022 10:40 )  PTT:35.4 sec  Urinalysis Basic - ( 2022 14:03 )    Color: Yellow / Appearance: Clear / S.030 / pH: x  Gluc: x / Ketone: Negative  / Bili: Negative / Urobili: 12 mg/dL   Blood: x / Protein: 30 mg/dL / Nitrite: Negative   Leuk Esterase: Negative / RBC: 3 /HPF / WBC 4 /HPF   Sq Epi: x / Non Sq Epi: 4 /HPF / Bacteria: Negative        Lactate, Blood: 2.4 mmol/L ( @ 10:40)      RADIOLOGY, EKG & ADDITIONAL TESTS: Reviewed.

## 2022-11-22 NOTE — ED ADULT TRIAGE NOTE - CHIEF COMPLAINT QUOTE
s/p fall out of his recliner, +right elbow laceration and left knee abrasion, denies anticoagulation, denies hitting his head

## 2022-11-22 NOTE — ED PROVIDER NOTE - OBJECTIVE STATEMENT
89 yo male w/ PMH of CAD, HTN, afib and SSS s/p AICD, CKD, dementia presents for fall.  Unwitnessed fall, per EMS roommate saw patient on the floor, was down for around 1 hour, unknown mechanism of fall, unknown head trauma, unknown LOC, -blood thinners.  Pt A&Ox0 here, unable to obtain further HPI.

## 2022-11-23 NOTE — OCCUPATIONAL THERAPY INITIAL EVALUATION ADULT - DIAGNOSIS, OT EVAL
Fall with acute right acromion fx vs. displaced distal right clavicular fx) and associated [id number] hematoma, age-indeterminate right 5-6th/ left 10-11th lateral rib fxs, age-indeterminate L2-L4 vertebral body fractures, b/l 11-12th posterior rib fxs, b/l pleural effusions, b/l renal cortical soft tissue masses

## 2022-11-23 NOTE — OCCUPATIONAL THERAPY INITIAL EVALUATION ADULT - GROOMING, PREVIOUS LEVEL OF FUNCTION, OT EVAL
Duration Of Freeze Thaw-Cycle (Seconds): 0
Consent: The patient's consent was obtained including but not limited to risks of crusting, scabbing, blistering, scarring, darker or lighter pigmentary change, recurrence, incomplete removal and infection.
Render Note In Bullet Format When Appropriate: No
Post-Care Instructions: I reviewed with the patient in detail post-care instructions. Patient is to wear sunprotection, and avoid picking at any of the treated lesions. Pt may apply Vaseline to crusted or scabbing areas.
Detail Level: Detailed
mod/needed assist

## 2022-11-23 NOTE — CONSULT NOTE ADULT - ASSESSMENT
ASSESSMENT  89yM w/ PMHx of Squamous cell carcinoma s/p resection and local flap with Dr. Doe 11/14/22, CAD s/p CABG 25 years ago, Afib and sick sinus syndrome s/p biventricular pacemaker and ICD, HTN, CKD3, GERD, HFrEF, lymphoma being followed by patient's oncologist seen as Trauma consult s/p unwitnessed fall at nursing home ID consulted for COVID      IMPRESSION  #Recent COVID19, continued PCR positivity, now on HFNC, rule out inflammatory phase    CXR Bibasilar opacities/effusions, greater on the left.    reports unvaccinated    Acute hypoxemic resp failure of multiple etiologies-     - Serum Pro-Brain Natriuretic Peptide: 05798 pg/mL (11.23.22 @ 11:54)    CT Moderate to large bilateral pleural effusions, left   greater than right, with bibasilar compressive atelectasis. Scattered   areas of interlobular septal thickening, predominantly at the apices,   with scattered bilateral groundglass opacities, suggestive of underlying   CHF.  #Trauma after fall with multiple injuries  Creatinine, Serum: 1.1 (11-23-22 @ 05:10)    Weight (kg): 72.6 (11-14-22 @ 07:30)    RECOMMENDATIONS  - Given timeline, beyond benefit of RDV  - Unclear if hypoxemia is from inflammatory COVID phase or volume overload (BNP elevated)  - Low threshold for dex 6mg PO   - Check CRP, ferritin, ddimer  - A/C per primary team   - Please inform ID if worsening oxygenation      If any questions, please call or send a message on [employer] Teams  Please continue to update ID with any pertinent new laboratory or radiographic findings  #[id number]

## 2022-11-23 NOTE — OCCUPATIONAL THERAPY INITIAL EVALUATION ADULT - PLANNED THERAPY INTERVENTIONS, OT EVAL
Principal Discharge DX:	Diverticulitis  
ADL retraining/IADL retraining/strengthening/transfer training

## 2022-11-23 NOTE — PROGRESS NOTE ADULT - SUBJECTIVE AND OBJECTIVE BOX
Plastic Surgery Progress Note    Subjective:     Patient seen and examined at bedside. Patient found to have multiple rib fractures and right clavicle fracture in setting of unwitnessed fall at home. Incidentally found to be asymptomatic COVID+ on PCR, however also positive in June 2022.     OBJECTIVE:     T(C): 37.1 (11-23-22 @ 06:00), Max: 37.1 (11-23-22 @ 06:00)  HR: 73 (11-23-22 @ 06:00) (69 - 88)  BP: 116/78 (11-23-22 @ 06:00) (73/58 - 175/82)  RR: 48 (11-23-22 @ 06:00) (18 - 48)  SpO2: 93% (11-23-22 @ 06:00) (91% - 100%)  Wt(kg): --    I&O's Detail    22 Nov 2022 07:01  -  23 Nov 2022 06:49  --------------------------------------------------------  IN:    Lactated Ringers: 400 mL    Lactated Ringers Bolus: 500 mL  Total IN: 900 mL    OUT:    Intermittent Catheterization - Urethral (mL): 150 mL  Total OUT: 150 mL    Total NET: 750 mL          PHYSICAL EXAM:    GENERAL: NAD, lying in bed comfortably  HEAD:  Atraumatic, Normocephalic  FACE: left cheek reconstruction with stable area of distal tip breakdown. No expressible drainage or purulence, fibrinous exudate. Xeroform changed to face.     MEDICATIONS  (STANDING):  acetaminophen     Tablet .. 650 milliGRAM(s) Oral every 6 hours  aMIOdarone    Tablet 100 milliGRAM(s) Oral daily  cephalexin 500 milliGRAM(s) Oral every 12 hours  cyanocobalamin 1000 MICROGram(s) Oral daily  divalproex  milliGRAM(s) Oral every 12 hours  divalproex  milliGRAM(s) Oral at bedtime  gabapentin 100 milliGRAM(s) Oral three times a day  heparin   Injectable 5000 Unit(s) SubCutaneous every 8 hours  lactated ringers Bolus 500 milliLiter(s) IV Bolus once  lactated ringers. 1000 milliLiter(s) (100 mL/Hr) IV Continuous <Continuous>  levothyroxine 25 MICROGram(s) Oral daily  lidocaine   4% Patch 1 Patch Transdermal daily  lisinopril 10 milliGRAM(s) Oral daily  magnesium sulfate  IVPB 2 Gram(s) IV Intermittent once  melatonin 5 milliGRAM(s) Oral at bedtime  memantine 5 milliGRAM(s) Oral daily  metoprolol tartrate 25 milliGRAM(s) Oral every 12 hours  pantoprazole    Tablet 40 milliGRAM(s) Oral before breakfast  senna 2 Tablet(s) Oral at bedtime    MEDICATIONS  (PRN):      LABS:                          7.7    5.23  )-----------( 148      ( 23 Nov 2022 05:10 )             23.3     11-23    142  |  105  |  24<H>  ----------------------------<  62<L>  4.8   |  32  |  1.1    Ca    8.3<L>      23 Nov 2022 05:10  Phos  3.4     11-23  Mg     1.8     11-23    TPro  6.7  /  Alb  2.6<L>  /  TBili  1.2  /  DBili  x   /  AST  24  /  ALT  7   /  AlkPhos  82  11-22    PT/INR - ( 22 Nov 2022 10:40 )   PT: 14.00 sec;   INR: 1.22 ratio         PTT - ( 22 Nov 2022 10:40 )  PTT:35.4 sec

## 2022-11-23 NOTE — PROGRESS NOTE ADULT - ATTENDING COMMENTS
Critical Care: [id number]   This patient has a high probability of sudden, clinically significant deterioration, which requires the highest level of physician preparedness to intervene urgently. I managed/supervised life or organ supporting interventions that required frequent physician assessment. I devoted my full attention in the ICU to the direct care of this patient for the period of time indicated below. Time I spent with the family or surrogate(s) is included only if the patient was incapable of providing the necessary information or participating in medical decision making. Time devoted to teaching and to any procedures I billed separately is not included.     IDRIS BACON 89y Male admitted to [x ] SICU /[ ] SDU with multiple ribs fractures with COVID+ for past 2 weeks, patient continues to have mild symptoms, high risk for hemodynamic instability, worsening CHF with bilateral pleural effusions  Patient is examined and evaluated at the bedside with SICU team. Treatment plan discussed with SICU team, nurses and primary team.   Chest X-ray and all relevant studies reviewed during rounds.  Will continue hemodynamic monitoring as per protocol in SICU.    Neuro:  GCS [15 ]   [ x]  Neurovascular checks as per SICU protocol                 [ ] 3% NaCl                     Paralysis [ ] Yes  [x ]  No      Sedated/Pain control with                 [ ] Dilaudid drip, [ ]  Ketamine drip, [ ] Fentanyl drip, [ ] Propofol, [ ] Precedex, [ ] Versed drip, [ ] Ativan drip,                           [ ] OxyContin standing,  [ ] OxyContin PRN, [ ] Dilaudid PRN pushes, [ ] Fentanyl PRN pushes, [ ] PCA,                [x ] Tylenol IV/PO, [x ] Gabapentin, [ ]  Ketorolac, [x ] Tramadol,  [x ] Lidoderm Patch       Other Medications               [ ] Seroquel, [ ] Zyprexa, [ ] Haldol,  [ ] Clonazepam [ ] Xanax, [ ] Versed/Ativan PRN, [ ] Valium [ x] None               [ ] Robaxin   [ ] Baclofen  [ ] Flexeril               [ ] Keppra  [ ] Lamictal  [ ] Depakote  [ ] Dilantin               [ ] CIWA (Ativan/Valium/ Librium)  CV: continue to monitor, conservative fluid management, decompensated CHF   On pressors [ ]  Yes  [ x]  No          [ ]  Levophed, [ ] Tahir-Synephrine, [ ] Vasopressin, [ ]  Epinephrine          [ ] Dobutamine, [ ] Milrinone, [ ]  Midodrine,  [ ] Others    Other Cardiac Meds          [ ] Amiodarone IV/PO, [ ] Digoxin, [ ] Cardizem drip, [ ] Cleviprex drip, [ ] Esmolol drip  Respiratory: Acute respiratory insufficiency -> continue monitoring                        None Invasive Support  [ x] Incentive Spirometer                                  [ ] BiPAP   [ ] CPAP/NIV   [ x] HFNC   [ ] NR Face Mask   [ ] NC  [ ] Trach Caller                        Ventilatory support  [ ] Yes [x ] No                            [ ] SBT                                  [ ] PC    [ ] VC   [ ] AC/PRVC   [ ] BiVent/APRV   [ ]CPAP   GI  [x ]  bowel regiment  [ x] BM none [x ] Flatus +             [ ] Ostomy   [ ] NG tube        Prophylaxis [x ] PPI  [ ] H2 Blockers  [ ] Others  Nutrition: continue   [x ] Diet DASH  [ ] TPN/PPN   [ ] calories count   [ ]  Tube Feeds     Renal: Continue I&Os monitoring, Garcia catheter  [ ] Yes,  [x ]   No ,  [ ] Consideration for discontinuation [ x] Taxes cath/PrimaFit  [ ] TOV  [ ] HD/CVVH       Lytes/Acid-base: replete hypokalemia, hypomagnesemia, hypocalcemia, hypophosphatemia        IV Fluids   [x ] LR@75ml/hr,  [ ] NS  [ ] D5W1/2NS [ ] Bicarbonate Drip  [ ] Albumin [ ] Lasix  ID: leukocytosis -> continue to monitor:         IV Abx [ ] Yes, [x ] No;  ID consulted [ ] Yes, [x ] No        Cultures send  [ ] Respiratory   [ ] Blood   [ ] Urine  [ ] Fluids  [ ] Tissue  [ x]  None  Lines:   [ ] Right   [ ] Left  [ ] Bilateral                     [ ] Subclavian TLC        [ ]  Internal Jugular TLC     [ ]  Femoral TLC                     [ ] Subclavian Cordis    [ ] Internal Jugular Cordis   [ ] Femoral Cordis                     [ ] HD catheter    [ ] PICC     [ ]  Midline   [x ] Peripheral IVs                                                 [ ] Right   [ ] Left   [ x] None                     [ ] Radial A-Line    [ ] Femoral A-Line   [ ] Axillary A-Line               Heme: continue to evaluate for acute blood loss anemia- trend Hg/Hct                     AC Reversal Indicated [ ] Yes  [x ] No                                      [ ] Kcentra,  [ ] 3Factors concentrate, [ ] Adnexxa                     Transfused  indicated  [ ] Yes, [x ] No    [ ] PRBCs   [ ] Platelets   [ ] FFPs   [ ] Cryoprecipitate                    Should be started on or continued with  following  [ ] Yes,  [x ] No                               [ ] Lovenox  [ ] Coumadin  [ ] Heparin drip  [ ] NOVACs  [ ] ASA  [ ] Antiplatelets   Endocrine: Prevent and treat hyperglycemia with insulin as needed,                                 Insuline drip [ ] Yes, [x ] No   PV: follow pulse exam  Skin: decub precautions  DVT Prophylaxis:  [x ] SCDs  [ ] Heparin SQ  [x ] Lovenox  SQ  Stress Gastritis Prophylaxis: PPI/H2 Blockers if indicated  Mobility: patient is evaluated at the bedside with mobility team and the goals for today are discussed with PT [x ] out of bed to chair    PATIENT/FAMILY/SURROGATE CONFERENCE:  [x ] Yes with patient at the bedside. [ ] No  PURPOSE: To obtain necessary information, To discuss treatment options under consideration today.    I saw and evaluated the patient personally. I have reviewed and agree with note above. Treatment plan discussed with SICU team, nurses and primary team at the time of the multidisciplinary rounds. The above note is NOT written at the time of rounds and will reflect all changes throughout management of the patient for the day note is written for.    Marisela Fuller MD, FACS  Trauma/ACS/SCC Attending

## 2022-11-23 NOTE — OCCUPATIONAL THERAPY INITIAL EVALUATION ADULT - PERTINENT HX OF CURRENT PROBLEM, REHAB EVAL
89yM w/ PMHx of Squamous cell carcinoma s/p resection and local flap with Dr. Doe 11/14/22, CAD s/p CABG 25 years ago, Afib and sick sinus syndrome s/p biventricular pacemaker and ICD, HTN, CKD3, GERD, HFrEF, lymphoma being followed by patient's oncologist seen as Trauma consult s/p unwitnessed fall at nursing home .  Trauma assessment in ED: ABCs intact , GCS 14 , AAOx0. Patient brought in by EMS, daughter at bedside to give history. Patient has history of frequent falls and is not on anticoagulation for that reason. She states his  baseline mental status is AAOx1-2, but he seemed more confused to her today. CTH negative, CT-Cspine negative. CT chest/abdomen/pelvis revealing R acromion fracture seen on CT vs. distal clavicular fracture on XR with associated 4x3 cm hematoma, b/l moderate-large pleural effusions, acute nondisplaced fractures of b/l 11-12 posterior ribs, age-indeterminate right 5-6 lateral rib fractures and left 10-11 lateral rib fractures, age-indeterminate L2-L4 compression deformities.

## 2022-11-23 NOTE — PROGRESS NOTE ADULT - SUBJECTIVE AND OBJECTIVE BOX
IDRIS BACON  [id number]  89y Male    Indication for ICU admission: respiratory monitoring in the setting of multiple rib fractures and incentive 750mL    Admit Date:11-22-22  ICU Date: 11/22/22  OR Date: 11/22/22    No Known Allergies    PAST MEDICAL & SURGICAL HISTORY:  Hypertension  HFrEF (May 2022 35-40%)  Skin cancer  Presence of automatic cardioverter/defibrillator (AICD)  s/p CABG       Home Medications:  amiodarone 100 mg oral tablet: 1 tab(s) orally once a day (14 Nov 2022 08:01)  Aspirin Low Dose 81 mg oral tablet, chewable: 1 tab(s) orally once a day (14 Nov 2022 08:01)  cyanocobalamin 100 mcg oral tablet: 1 tab(s) orally once a day (14 Nov 2022 08:01)  levothyroxine 25 mcg (0.025 mg) oral tablet: 1 tab(s) orally once a day (14 Nov 2022 08:01)  Melatonin 5 mg oral tablet: 1 tab(s) orally once a day (at bedtime) (14 Nov 2022 08:01)  memantine 5 mg oral tablet: 1 tab(s) orally once a day (14 Nov 2022 08:01)  omeprazole 20 mg oral delayed release capsule: 1 cap(s) orally once a day (14 Nov 2022 08:01)  Vitamin B12 50 mcg oral tablet: 2 tab(s) orally once a day (14 Nov 2022 08:01)      24HRS EVENT:  11/22  Night  -questionable urine output throughout day, bladder scan  -straight cath'd, 200cc came out  - bolus   -/hr   -agitated, zyprexa x1, bilateral mittens  -continues to be persistently hypotensive MAP 62 and tachypneic 30s, pt alert and muttering nonsense, appears to be consistent with his baseline episodes per daughter; agitation improved slightly; will give another 250cc bolus and re evaluate       DVT PTX: heparin subQ    GI PTX:pantoprazole    Tablet 40 milliGRAM(s) Oral before breakfast      ***Tubes/Lines/Drains  ***  - PIVs      REVIEW OF SYSTEMS  [ ] A ten-point review of systems was otherwise negative except as noted.  [X] Due to altered mental status/intubation, subjective information were not able to be obtained from the patient. History was obtained, to the extent possible, from review of the chart and collateral sources of information.

## 2022-11-23 NOTE — PROGRESS NOTE ADULT - ASSESSMENT
ASSESSMENT:  89 year old male with past medical history significant for squamous cell carcinoma s/p resection and local flap on 11/14/22, CAD s/p CABG, afib and SSS s/p biventricular pacemaker and ICD, HTN, CKD3, GERD, HFrEF who was seen as a trauma consult s/p unwitnessed fall at his nursing. Trauma assessment in the ED: ABCs were intact, GCS 14, AAOx0.    Patient found on trauma imaging workup to have the following injuries:    -R acromion fracture with associated hematoma  -b/l moderate-large sized pleural effusions  -acute nondisplaced fracture of b/l 11-12 posterior ribs  -age-indeterminate right 5-6 lateral rib fractures   -left 10-11 lateral rib fractures  -L2-L4 compression deformities     Non-traumatic injuries identifies were:  -heterogenous b/l renal cortical soft tissue masses suspicious for neoplasm -thickening of the left anterolateral bladder wall  -cholelithiasis  -fat containing inguinal hernia    Patient was admitted to SICU for respiratory monitoring in the setting of multiple rib fractures and being able to pull only 750mL on incentive spirometry.    PLAN:  -Care per SICU  -Pain control with Tylenol, Gabapentin, Lidocaine patch, also to use lumbar brace for support when OOB  -Continue home Memantine & Depakote  -Patient seen by plastics, wound care recs appreciated, to continue Keflex 500mg BID until 11/26   -Encourage incentive spirometry, currently only pulling 750mL  -Monitor respiratory status  -Monitor BP, continue home medications including Lisinopril, Amiodarone, Metoprolol  -F/U repeat echo (most recent from 5/22-EF 35-40% w/ severely enlarged LA  -Regular diet, GI ppx, Senna for bowel reg, continue LR until tolerating diet  -Monitor UOP  -Monitor labs, replete electrolytes as needed   -DVT ppx    [phone]

## 2022-11-23 NOTE — CONSULT NOTE ADULT - SUBJECTIVE AND OBJECTIVE BOX
IDRIS BACON  89y, Male  Allergy: No Known Allergies      CHIEF COMPLAINT:   respiratory monitoring in the setting of multiple rib fractures (2022 04:48)      LOS  1d    HPI  HPI:  TRAUMA ACTIVATION LEVEL: CONSULT  ACTIVATED BY: ED  INTUBATED: NO    HPI:    89yM w/ PMHx of Squamous cell carcinoma s/p resection and local flap with Dr. Doe 22, CAD s/p CABG 25 years ago, Afib and sick sinus syndrome s/p biventricular pacemaker and ICD, HTN, CKD3, GERD, HFrEF, lymphoma being followed by patient's oncologist seen as Trauma consult s/p unwitnessed fall at nursing home .  Trauma assessment in ED: ABCs intact , GCS 14 , AAOx0. Patient brought in by EMS, daughter at bedside to give history. Patient has history of frequent falls and is not on anticoagulation for that reason. She states his  baseline mental status is AAOx1-2, but he seemed more confused to her today. CTH negative, CT-Cspine negative. CT chest/abdomen/pelvis revealing R acromion fracture seen on CT vs. distal clavicular fracture on XR with associated 4x3 cm hematoma, b/l moderate-large pleural effusions, acute nondisplaced fractures of b/l 11-12 posterior ribs, age-indeterminate right 5-6 lateral rib fractures and left 10-11 lateral rib fractures, age-indeterminate L2-L4 compression deformities.   Non-traumatic injuries identified: heterogenous b/l renal cortical soft tissue masses suspicious of neoplasm, thickening of left anteriolateral bladder wall, cholelithiasis, fat-containing left inguinal hernia. (2022 14:42)      INFECTIOUS DISEASE HISTORY:  ID consulted for COVID, per SICU team, per pt's daughter, he tested positive for COVID a few weeks ago     Currently ordered for:  cephalexin 500 milliGRAM(s) Oral every 12 hours      PMH  PAST MEDICAL & SURGICAL HISTORY:  Heart disease      Hypertension      Skin cancer      Presence of automatic cardioverter/defibrillator (AICD)      History of open heart surgery      FHx: skin cancer          FAMILY HISTORY  non-contributory     SOCIAL HISTORY  Social History:  Please refer to above for more details (08 May 2022 21:28)        ROS  General: Denies rigors, nightsweats  HEENT:+ rhinorrhea,  CV: Denies CP, palpitations  PULM: Denies wheezing, hemoptysis  GI: Denies hematemesis, hematochezia, melena  : Denies discharge, hematuria  MSK: Denies arthralgias, myalgias  SKIN: Denies rash, lesions  NEURO: Denies paresthesias, weakness  PSYCH: Denies depression, anxiety     VITALS:  T(F): 98.1, Max: 98.8 (- @ 06:00)  HR: 71  BP: 133/64  RR: 38Vital Signs Last 24 Hrs  T(C): 36.7 (2022 08:00), Max: 37.1 (2022 06:00)  T(F): 98.1 (2022 08:00), Max: 98.8 (2022 06:00)  HR: 71 (2022 13:00) (69 - 88)  BP: 133/64 (2022 13:00) (73/58 - 175/82)  BP(mean): 85 (2022 13:00) (59 - 130)  RR: 38 (2022 13:00) (18 - 48)  SpO2: 100% (2022 13:00) (91% - 100%)    Parameters below as of 2022 13:00  Patient On (Oxygen Delivery Method): nasal cannula, high flow  O2 Flow (L/min): 50  O2 Concentration (%): 50    PHYSICAL EXAM:  Gen: NAD, Elderly M on HFNC  HEENT: Normocephalic, atraumatic  Neck: supple, no lymphadenopathy  CV: Regular rate & regular rhythm  Lungs: decreased BS at bases, no fremitus  Abdomen: Soft, BS present  Ext: Warm, well perfused  Neuro: non focal, awake  Skin: no rash, no erythema  Lines: no phlebitis     TESTS & MEASUREMENTS:                        8.7    5.45  )-----------( 169      ( 2022 11:54 )             26.4         142  |  105  |  24<H>  ----------------------------<  62<L>  4.8   |  32  |  1.1    Ca    8.3<L>      2022 05:10  Phos  3.4       Mg     1.8         TPro  6.7  /  Alb  2.6<L>  /  TBili  1.2  /  DBili  x   /  AST  24  /  ALT  7   /  AlkPhos  82        LIVER FUNCTIONS - ( 2022 10:40 )  Alb: 2.6 g/dL / Pro: 6.7 g/dL / ALK PHOS: 82 U/L / ALT: 7 U/L / AST: 24 U/L / GGT: x           Urinalysis Basic - ( 2022 14:03 )    Color: Yellow / Appearance: Clear / S.030 / pH: x  Gluc: x / Ketone: Negative  / Bili: Negative / Urobili: 12 mg/dL   Blood: x / Protein: 30 mg/dL / Nitrite: Negative   Leuk Esterase: Negative / RBC: 3 /HPF / WBC 4 /HPF   Sq Epi: x / Non Sq Epi: 4 /HPF / Bacteria: Negative        Culture - Blood (collected 22 @ 18:53)  Source: .Blood Blood-Peripheral  Final Report (22 @ 04:00):    No Growth Final    Culture - Blood (collected 22 @ 18:53)  Source: .Blood Blood-Peripheral  Final Report (22 @ 04:00):    No Growth Final    Culture - Urine (collected 22 @ 18:34)  Source: Clean Catch Clean Catch (Midstream)  Final Report (22 @ 07:13):    <10,000 CFU/mL Normal Urogenital Dolly        Blood Gas Venous - Lactate: 1.70 mmol/L (22 @ 13:17)  Blood Gas Venous - Lactate: 1.80 mmol/L (22 @ 12:43)  Lactate, Blood: 2.4 mmol/L (22 @ 10:40)      INFECTIOUS DISEASES TESTING  COVID-19 PCR: Detected (22 @ 10:40)  COVID-19 PCR: Detected (22 @ 17:10)  COVID-19 PCR: NotDetec (05-15-22 @ 15:17)  COVID-19 PCR: NotDetec (22 @ 18:10)  COVID-19 PCR: NotDetec (05-10-22 @ 19:00)  COVID-19 PCR: NotDetec (22 @ 15:35)      INFLAMMATORY MARKERS      RADIOLOGY & ADDITIONAL TESTS:  I have personally reviewed the last Chest xray  CXR  Xray Chest 1 View AP/PA:   ACC: [id number] EXAM:  XR CHEST 1 VIEW                          PROCEDURE DATE:  2022          INTERPRETATION:  Clinical History / Reason for exam: Trauma code,   unwitnessed fall    Comparison : Chest radiograph dated 10/10/2022.    Technique/Positioning: Frontal portable.    Findings:    Support devices: Left chest ICD.    Cardiac/mediastinum/hilum: Status post median sternotomy. Cardiomegaly.    Lung parenchyma/Pleura: Bibasilar opacities/effusions, greater on the   left. No pneumothorax.    Skeleton/soft tissues: Degenerative changes    Impression:    Bibasilar opacities/effusions, greater on the left.    --- End of Report ---          MANNY CUBA MD; Resident Radiologist  This document has been electronically signed.  RUSTY TRIPLETT MD; Attending Radiologist  This document has been electronically signed. 2022 11:44AM (22 @ 11:03)      CT  CT Abdomen and Pelvis w/ IV Cont:   ACC: [id number] EXAM:  CT ABDOMEN AND PELVIS IC                          PROCEDURE DATE:  2022          INTERPRETATION:  CLINICAL STATEMENT: Trauma.    TECHNIQUE: Contiguous axial CT images were obtained from the thoracic   inlet to the pubic symphysis following administration of 100c Optiray 320   intravenous contrast.  Oral contrast was not administered.  Reformatted   images in the coronal and sagittal planes were acquired.    COMPARISON CT: None.    FINDINGS:    CHEST:    LUNGS/PLEURA/AIRWAYS: Moderate to large bilateral pleural effusions, left   greater than right, with bibasilar compressive atelectasis. Scattered   areas of interlobular septal thickening, predominantly at the apices,   with scattered bilateral groundglass opacities, suggestive of underlying   CHF.    MEDIASTINUM/THORACIC NODES: No enlarged thoracic lymph nodes.    HEART/GREAT VESSELS: Moderate cardiomegaly. Left chest wall pacer device   in place. Atherosclerotic calcification of the thoracic aorta and   coronary arteries. No pericardial effusion.    ABDOMEN/PELVIS:    HEPATOBILIARY: Cholelithiasis is noted.    SPLEEN: Unremarkable.    PANCREAS: Unremarkable.    ADRENAL GLANDS: Unremarkable.    KIDNEYS: Heterogeneous bilateral renal cortical soft tissue masses are   noted, measuring up to 3.7 x 2.8 cm on the right and 2.5 x 1.8 cm on the   left, suspicious for renal cortical neoplasm (4/290; 4/263.) Multiple   bilateral renal cysts are noted. Multiple additional bilateral renal   densities are incompletely characterized.    ABDOMINOPELVIC NODES: Unremarkable.    PELVIC ORGANS: Mild asymmetric thickening along the left anterolateral   wall of urinary bladder, possibly reflecting cystitis, however underlying   neoplastic process can have this appearance. Coarse prostatic   calcification.    PERITONEUM/MESENTERY/BOWEL: No evidence of bowel obstruction. No ascites   or free intraperitoneal air.    BONES/SOFT TISSUES: Partially imaged fracture fragments adjacent to the   right acromion, with associated 4.1 x 2.3 x 3.2 cm hematoma, suspicious   for acute or subacute fracture. Acute nondisplaced fractures of the   bilateral 11th and 12th posterior ribs, adjacent to the costovertebral   junction. Age-indeterminate fractures of the right 5th and 6thlateral   ribs and left 10th and 11th lateral ribs. Degenerative changes of the   spine with mild age-indeterminate compression deformities of the L2-L4   vertebral bodies.    OTHER: Small fat-containing left inguinal hernia. Extensive   atherosclerotic vascular calcification.      IMPRESSION:    1.Partially imaged fracture fragments adjacent to the right acromion,   with associated 4.1 x 2.3 x 3.2 cm hematoma, suspicious for acute or   subacute fracture.    2. Acute nondisplaced fractures of thebilateral 11th and 12th posterior   ribs, adjacent to the costovertebral junction.    3. Age-indeterminate fractures of the right 5th and 6th lateral ribs and   left 10th and 11th lateral ribs; correlation with point tenderness is   suggested..    4.Moderate to large bilateral pleural effusions, with scattered areas of   upper lobe predominant interlobular septal thickening and groundglass   opacities, suggestive of underlying CHF.    5. Heterogeneous bilateral renal cortical soft tissue masses,measuring   up to 3.7 cm within the right renal interpolar region as detailed above,   suspicious for renal cortical neoplasm.    6. Mild asymmetric thickening along the left anterolateral wall of   urinary bladder, possibly reflecting cystitis, however underlying   neoplastic process can have this appearance; correlation with urinalysis   may be of use.        Findings were discussed with Dr. Weber at 12:40PM on 2022.    --- End of Report ---            JUAN DELONG MD; Attending Radiologist  This document has been electronically signed. 2022 12:46PM (22 @ 11:33)      CARDIOLOGY TESTING  12 Lead ECG:   Ventricular Rate 75 BPM    Atrial Rate 94 BPM    QRS Duration 152 ms    Q-T Interval 492 ms    QTC Calculation(Bazett) 549 ms    R Axis 249 degrees    T Axis 50 degrees    Diagnosis Line Ventricular-paced rhythm    Abnormal ECG    Confirmed by ARTHUR NANCE MD ([id number]) on 2022 7:03:57 AM (22 @ 04:30)  12 Lead ECG:   Ventricular Rate 71 BPM    Atrial Rate 59 BPM    QRS Duration 156 ms    Q-T Interval 506 ms    QTC Calculation(Bazett) 549 ms    R Axis -79 degrees    T Axis 88 degrees    Diagnosis Line Ventricular-paced rhythm        Confirmed by ARTHUR NANCE MD ([id number]) on 2022 7:05:12 AM (22 @ 19:50)      MEDICATIONS  acetaminophen     Tablet .. 650 Oral every 6 hours  aMIOdarone    Tablet 100 Oral daily  cephalexin 500 Oral every 12 hours  cyanocobalamin 1000 Oral daily  divalproex  Oral every 12 hours  divalproex  Oral at bedtime  gabapentin 100 Oral three times a day  heparin   Injectable 5000 SubCutaneous every 8 hours  levothyroxine 25 Oral daily  lidocaine   4% Patch 1 Transdermal daily  melatonin 5 Oral at bedtime  memantine 5 Oral daily  metoprolol tartrate 25 Oral every 12 hours  pantoprazole    Tablet 40 Oral before breakfast  senna 2 Oral at bedtime        ANTIBIOTICS:  cephalexin 500 milliGRAM(s) Oral every 12 hours      ALLERGIES:  No Known Allergies

## 2022-11-23 NOTE — OCCUPATIONAL THERAPY INITIAL EVALUATION ADULT - NSOTDISCHREC_GEN_A_CORE
Patient requires assistance with activities of daily living. OT recommends D/C to rehabilitation facility when medically appropriate. Refer to evaluation/treatment for details.

## 2022-11-23 NOTE — PROGRESS NOTE ADULT - SUBJECTIVE AND OBJECTIVE BOX
GENERAL SURGERY PROGRESS NOTE    Patient: IDRIS BACON , 89y ([birth year])Male   MRN: [id number]  Location: [address]  Visit: 22 Inpatient  Date: 22 @ 08:37    Hospital Day #: 2    Procedure/Dx/Injuries: s/p fall found down, ?HT, ?LOC, -AC, right acromion fracture w/ hematoma, posterior rib fractures (11-12, b/l), displaced right clavicular fracture, L2-L4 compression fractures     Events of past 24 hours:     Night  -questionable urine output throughout day, bladder scan  -straight cath'd, 200cc came out  - bolus   -/hr   -agitated, zyprexa x1, bilateral mittens  -continues to be persistently hypotensive MAP 62 and tachypneic 30s, pt alert and muttering nonsense, appears to be consistent with his baseline episodes per daughter; agitation improved slightly; will give another 250cc bolus and re evaluate   -Mg repleted  -trop 0.02     PAST MEDICAL & SURGICAL HISTORY:  Heart disease  Hypertension  Skin cancer  Presence of automatic cardioverter/defibrillator (AICD)  History of open heart surgery  FHx: skin cancer    Vitals:   T(F): 98.1 (22 @ 08:00), Max: 98.8 (22 @ 06:00)  HR: 73 (22 @ 06:00)  BP: 116/78 (22 @ 06:00)  RR: 48 (22 @ 06:00)  SpO2: 93% (22 @ 06:00)    Diet, DASH/TLC:   Sodium & Cholesterol Restricted    Fluids: lactated ringers Bolus:   500 milliLiter(s), IV Bolus, once, infuse over 60 Minute(s), Stop After 1 Doses  Provider's Contact #: [phone]  lactated ringers.: Solution, 1000 milliLiter(s) infuse at 100 mL/Hr  Provider's Contact #: [phone]    I & O's:    22 @ 07:01  -  22 @ 07:00  --------------------------------------------------------  IN:    Lactated Ringers: 400 mL    Lactated Ringers Bolus: 500 mL  Total IN: 900 mL    OUT:    Intermittent Catheterization - Urethral (mL): 150 mL  Total OUT: 150 mL    Total NET: 750 mL    PHYSICAL EXAM:  General: NAD, AAOx1  HEENT: left facial surgical wound, clean, dry  Cardiac: S1, S2, RRR  Respiratory: decreased breath sounds b/l bases, normal respiratory effort  Abdomen: Soft, non-distended, non-tender  Groin: Normal appearing, pelvis stable   Ext:  Moving b/l upper and lower extremities. Palpable Radial and DP pulses bilaterally.  Left forearm abrasion, Left knee abrasion    MEDICATIONS  (STANDING):  acetaminophen     Tablet .. 650 milliGRAM(s) Oral every 6 hours  aMIOdarone    Tablet 100 milliGRAM(s) Oral daily  cephalexin 500 milliGRAM(s) Oral every 12 hours  cyanocobalamin 1000 MICROGram(s) Oral daily  divalproex  milliGRAM(s) Oral every 12 hours  divalproex  milliGRAM(s) Oral at bedtime  gabapentin 100 milliGRAM(s) Oral three times a day  heparin   Injectable 5000 Unit(s) SubCutaneous every 8 hours  lactated ringers Bolus 500 milliLiter(s) IV Bolus once  lactated ringers. 1000 milliLiter(s) (100 mL/Hr) IV Continuous <Continuous>  levothyroxine 25 MICROGram(s) Oral daily  lidocaine   4% Patch 1 Patch Transdermal daily  lisinopril 10 milliGRAM(s) Oral daily  magnesium sulfate  IVPB 2 Gram(s) IV Intermittent once  melatonin 5 milliGRAM(s) Oral at bedtime  memantine 5 milliGRAM(s) Oral daily  metoprolol tartrate 25 milliGRAM(s) Oral every 12 hours  pantoprazole    Tablet 40 milliGRAM(s) Oral before breakfast  senna 2 Tablet(s) Oral at bedtime    MEDICATIONS  (PRN):    DVT PROPHYLAXIS: heparin   Injectable 5000 Unit(s) SubCutaneous every 8 hours    GI PROPHYLAXIS: pantoprazole    Tablet 40 milliGRAM(s) Oral before breakfast    ANTICOAGULATION:   ANTIBIOTICS:  cephalexin 500 milliGRAM(s)    LAB/STUDIES:  Labs:  CAPILLARY BLOOD GLUCOSE    POCT Blood Glucose.: 73 mg/dL (2022 06:45)  POCT Blood Glucose.: 98 mg/dL (2022 10:04)                        7.7    5.23  )-----------( 148      ( 2022 05:10 )             23.3       Auto Neutrophil %: 58.7 % (22 @ 05:10)  Auto Immature Granulocyte %: 0.2 % (22 @ 05:10)  Auto Neutrophil %: 70.7 % (22 @ 10:40)  Auto Immature Granulocyte %: 0.3 % (22 @ 10:40)        142  |  105  |  24<H>  ----------------------------<  62<L>  4.8   |  32  |  1.1    Calcium, Total Serum: 8.3 mg/dL (22 @ 05:10)    LFTs:             6.7  | 1.2  | 24       ------------------[82      ( 2022 10:40 )  2.6  | x    | 7           Lipase:14     Amylase:x         Blood Gas Venous - Lactate: 1.70 mmol/L (22 @ 13:17)  Blood Gas Venous - Lactate: 1.80 mmol/L (22 @ 12:43)  Lactate, Blood: 2.4 mmol/L (22 @ 10:40)    Coags:     14.00  ----< 1.22    ( 2022 10:40 )     35.4      CARDIAC MARKERS ( 2022 05:10 )  x     / 0.02 ng/mL / 15 U/L / x     / 2.8 ng/mL  CARDIAC MARKERS ( 2022 10:40 )  x     / 0.02 ng/mL / 60 U/L / x     / x        Alcohol, Blood: <10 mg/dL (22 @ 10:40)    Urinalysis Basic - ( 2022 14:03 )    Color: Yellow / Appearance: Clear / S.030 / pH: x  Gluc: x / Ketone: Negative  / Bili: Negative / Urobili: 12 mg/dL   Blood: x / Protein: 30 mg/dL / Nitrite: Negative   Leuk Esterase: Negative / RBC: 3 /HPF / WBC 4 /HPF   Sq Epi: x / Non Sq Epi: 4 /HPF / Bacteria: Negative    Alcohol, Blood: <10 mg/dL (22 @ 10:40)

## 2022-11-23 NOTE — PROGRESS NOTE ADULT - ASSESSMENT
A/P: 89y male with dementia admitted s/p fall with rib fractures and right clavicle fracture. Plastic surgery following as patient is s/p SCC resection from left cheek with local flap coverage for reconstruction    - wound stable this AM  - daily xeroform changes to face - to be performed by RN  - continue abx   - few remaining sutures to remain in place     Yared Mata MD, PhD  Plastic Surgery Resident, PGY4  [facility]: [phone]  [facility]: [id number]  Available on [employer] Teams

## 2022-11-23 NOTE — PROGRESS NOTE ADULT - ASSESSMENT
Assessment & Plan  89y Male w/ acute right acromion fx vs. displaced distal right clavicular fx) and associated [id number] hematoma, age-indeterminate right 5-6th/ left 10-11th lateral rib fxs, age-indeterminate L2-L4 vertebral body fractures, b/l 11-12th posterior rib fxs, b/l pleural effusions, b/l renal cortical soft tissue masses.    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 with reconstruction with local flap elevation and closure  - plastics consult for drainage from surgical site  - no acute concern for infection of surgical site at this time  - recommend daily xeroform to incision   - Patient taking Keflex 500 BID from office with end date of 11/26 - would continue while in house         RESP:   #age-indeterminate right 5-6th/ left 10-11th lateral rib fxs, age-indeterminate L2-L4 vertebral body fractures, b/l 11-12th posterior rib fxs  - pulling 750cc on IS  - AM CXR  - monitor SpO2      CARDS:   #Hypotension  - cautious fluid resuscitation for persistent hypotension MAP60 in the night, total of ~750 given so far, will add an additional 250cc if no improvement, consider low dose levo   #Hx of HTN  - c/w home lisinopril 10mg qd  #Hx of A-fib w/ sick sinus syndrome (s/p biventricular pacemaker placement; last interrogated 11/14/22, no events)  - c/w amiodarone 100mg PO qd  - c/w metoprolol 25mg q12 PO  #Hx of HFrEF  - repeat echo pending  - Echo (5/9/22): EF 35-40%, G1DD, severely enlarged LA   - cautious fluid resuscitation       GI/NUTR:   #Diet  - regular DASH  #GERD  - PPI  #Bowel regimen  - senna      /RENAL:   #urine output in critically ill  - f/u if void  #IV fluids  - LR @ 50 cc/hr increased to 100 cc  - IVL once tolerating diet  #urine output in critically ill    -indwelling tapia (placed 11/23)    Current Rx:     Labs:          BUN/Cr- 27/1.3  -->,  25/1.0  -->          Electrolytes-Na 143 // K 5.2 // Mg 2.0 //  Phos 3.6 (11-22 @ 23:06)  #CKD stage 3  - lactate 1.7      HEME/ONC:     #DVT prophylaxis    -heparin   Injectable    Labs: Hb/Hct:  9.8/29.5  -->,  8.9/27.3  -->                      Plts:  191  -->,  168  -->                 PTT/INR:  35.4/1.22  --->       ID:  WBC- 6.76  --->>,  5.12  --->>  Temp trend- 24hrs T(F): 94.5 (11-23 @ 00:00), Max: 95 (11-22 @ 20:25)  Current antibiotics-cephalexin 500 every 12 hours    #COVID positive  - asymptomatic      ENDO:  POCT Blood Glucose.: 98 mg/dL (22 Nov 2022 10:04)    #Hypothyroidism  - c/w home synthroid     LINES/DRAINS:  PIV    Advanced Directives: DNR/DNI  HCP/Emergency Contact- daughter. Spoke to daughter on several occasions to confirm DNR/DNI directives. NO invasion resuscitative measures are to be taken, ie chest tubes.    Indication for SICU: respiratory monitoring in the setting of multiple rib fractures and incentive spirometry 750mL    DISPO:    SICU. Case discussed with Dr. Fuller.

## 2022-11-23 NOTE — OCCUPATIONAL THERAPY INITIAL EVALUATION ADULT - LEVEL OF INDEPENDENCE: SUPINE/SIT, REHAB EVAL
Pt not following commands consistently. Pt easily agitated overnight with episodes of hitting and spitting. Pt positioned in bed in chair and tolerated well. OT to attempt supine to sit and sit to stand when safe./unable to perform

## 2022-11-23 NOTE — OCCUPATIONAL THERAPY INITIAL EVALUATION ADULT - ORIENTATION, REHAB EVAL
Pt able to state name. Pt not oriented to place, date, or situation. Pt unable to identify daughter in room/person

## 2022-11-24 NOTE — PROGRESS NOTE ADULT - SUBJECTIVE AND OBJECTIVE BOX
patient is seen examined resident daughter at bedside shoulder appears more comfortable able to move freely will treat fracture

## 2022-11-24 NOTE — PROGRESS NOTE ADULT - SUBJECTIVE AND OBJECTIVE BOX
IDRIS BACON  [id number]  89y Male w/ acute right acromion fx (vs. displaced distal right clavicular fx) and associated 6g3p5cu hematoma, age-indeterminate right 5-6th/ left 10-11th lateral rib fxs, age-indeterminate L2-L4 vertebral body fractures, b/l 11-12th posterior rib fxs, b/l pleural effusions, b/l renal cortical soft tissue masses.    Indication for ICU admission: respiratory monitoring in the setting of multiple rib fractures and incentive 750mL    Admit Date:22  ICU Date: 22  OR Date: 22    No Known Allergies    PAST MEDICAL & SURGICAL HISTORY:  Hypertension  HFrEF (May 2022 35-40%)  Skin cancer  Presence of automatic cardioverter/defibrillator (AICD)  s/p CABG       Home Medications:  amiodarone 100 mg oral tablet: 1 tab(s) orally once a day (2022 08:01)  Aspirin Low Dose 81 mg oral tablet, chewable: 1 tab(s) orally once a day (2022 08:01)  cyanocobalamin 100 mcg oral tablet: 1 tab(s) orally once a day (2022 08:01)  levothyroxine 25 mcg (0.025 mg) oral tablet: 1 tab(s) orally once a day (2022 08:01)  Melatonin 5 mg oral tablet: 1 tab(s) orally once a day (at bedtime) (2022 08:01)  memantine 5 mg oral tablet: 1 tab(s) orally once a day (2022 08:01)  omeprazole 20 mg oral delayed release capsule: 1 cap(s) orally once a day (2022 08:01)  Vitamin B12 50 mcg oral tablet: 2 tab(s) orally once a day (2022 08:01)        24HRS EVENT:    Night  -pending bed ceu  -uop dropped, 500cc LR  -hypothermic, bear hugger placed  -am abg     Day:  - ID c/s (Dr. Cross) --> no need to treat covid (since according to daughter, patient tested positive 2-3 weeks ago); f/u inflammatory markers (CRP, ferritin, d-dimer)  -d/c Lisinopril for now   -AB.42/50/69/32-96%, HF 50/50 placed   -1100 CE 0.04  -BNP 12,163  -1100 H/h 8.7/26.4 stable   -IVL  -Lasix 20mg IVx1 --> no urine since 6am, tapia placed -> 300cc out   -ECHO: EF 32%, mild MR, modertae TR, mild to moderate AR, mild pulmonary HTN  -tx to medicine ([facility] accepted to CEU) - hospitalist to see the patient once in CEU    DVT PTX: heparin subQ    GI PTX: pantoprazole    Tablet 40 milliGRAM(s) Oral before breakfast      ***Tubes/Lines/Drains  ***  - PIVs      REVIEW OF SYSTEMS  [ ] A ten-point review of systems was otherwise negative except as noted.  [X] Due to altered mental status/intubation, subjective information were not able to be obtained from the patient. History was obtained, to the extent possible, from review of the chart and collateral sources of information. IDRIS BACON  [id number]  89y Male w/ acute right acromion fx (vs. displaced distal right clavicular fx) and associated [id number] hematoma, age-indeterminate right 5-6th/ left 10-11th lateral rib fxs, age-indeterminate L2-L4 vertebral body fractures, b/l 11-12th posterior rib fxs, b/l pleural effusions, b/l renal cortical soft tissue masses.    Indication for ICU admission: respiratory monitoring in the setting of multiple rib fractures and incentive 750mL    Admit Date:22  ICU Date: 22  OR Date: 22    No Known Allergies    PAST MEDICAL & SURGICAL HISTORY:  Hypertension  HFrEF (May 2022 35-40%)  Skin cancer  Presence of automatic cardioverter/defibrillator (AICD)  s/p CABG       Home Medications:  amiodarone 100 mg oral tablet: 1 tab(s) orally once a day (2022 08:01)  Aspirin Low Dose 81 mg oral tablet, chewable: 1 tab(s) orally once a day (2022 08:01)  cyanocobalamin 100 mcg oral tablet: 1 tab(s) orally once a day (2022 08:01)  levothyroxine 25 mcg (0.025 mg) oral tablet: 1 tab(s) orally once a day (2022 08:01)  Melatonin 5 mg oral tablet: 1 tab(s) orally once a day (at bedtime) (2022 08:01)  memantine 5 mg oral tablet: 1 tab(s) orally once a day (2022 08:01)  omeprazole 20 mg oral delayed release capsule: 1 cap(s) orally once a day (2022 08:01)  Vitamin B12 50 mcg oral tablet: 2 tab(s) orally once a day (2022 08:01)        24HRS EVENT:    Night  -pending bed ceu  -uop dropped, 500cc LR  -hypothermic, bear hugger placed  -am abg     Day:  - ID c/s (Dr. Cross) --> no need to treat covid (since according to daughter, patient tested positive 2-3 weeks ago); f/u inflammatory markers (CRP, ferritin, d-dimer)  -d/c Lisinopril for now   -AB.42/50/69/32-96%, HF 50/50 placed   -1100 CE 0.04  -BNP 12,163  -1100 H/h 8.7/26.4 stable   -IVL  -Lasix 20mg IVx1 --> no urine since 6am, tapia placed -> 300cc out   -ECHO: EF 32%, mild MR, modertae TR, mild to moderate AR, mild pulmonary HTN  -tx to medicine ([facility] accepted to CEU) - hospitalist to see the patient once in CEU    DVT PTX: heparin subQ    GI PTX: pantoprazole    Tablet 40 milliGRAM(s) Oral before breakfast      ***Tubes/Lines/Drains  ***  - PIVs      REVIEW OF SYSTEMS  [ ] A ten-point review of systems was otherwise negative except as noted.  [X] Due to altered mental status/intubation, subjective information were not able to be obtained from the patient. History was obtained, to the extent possible, from review of the chart and collateral sources of information.    Daily     Daily     Diet, DASH/TLC:   Sodium & Cholesterol Restricted (22 @ 18:49)      CURRENT MEDS:  Neurologic Medications  acetaminophen     Tablet .. 650 milliGRAM(s) Oral every 6 hours  divalproex  milliGRAM(s) Oral every 12 hours  divalproex  milliGRAM(s) Oral at bedtime  gabapentin 100 milliGRAM(s) Oral three times a day  melatonin 5 milliGRAM(s) Oral at bedtime  memantine 5 milliGRAM(s) Oral daily    Respiratory Medications    Cardiovascular Medications  aMIOdarone    Tablet 100 milliGRAM(s) Oral daily  metoprolol tartrate 25 milliGRAM(s) Oral every 12 hours    Gastrointestinal Medications  cyanocobalamin 1000 MICROGram(s) Oral daily  pantoprazole    Tablet 40 milliGRAM(s) Oral before breakfast  senna 2 Tablet(s) Oral at bedtime    Genitourinary Medications    Hematologic/Oncologic Medications  heparin   Injectable 5000 Unit(s) SubCutaneous every 8 hours    Antimicrobial/Immunologic Medications  cephalexin 500 milliGRAM(s) Oral every 12 hours    Endocrine/Metabolic Medications  levothyroxine 25 MICROGram(s) Oral daily    Topical/Other Medications  lidocaine   4% Patch 1 Patch Transdermal daily      ICU Vital Signs Last 24 Hrs  T(C): 36.7 (2022 07:00), Max: 36.7 (2022 06:00)  T(F): 98.1 (2022 07:00), Max: 98.1 (2022 07:00)  HR: 74 (:00) (69 - 74)  BP: 105/64 (2022 07:00) (97/56 - 164/82)  BP(mean): 76 (2022 07:00) (71 - 118)  ABP: --  ABP(mean): --  RR: 28 (:) (16 - 43)  SpO2: 100% (:) (99% - 100%)    O2 Parameters below as of 2022 20:00  Patient On (Oxygen Delivery Method): nasal cannula, high flow          ABG - ( 2022 09:18 )  pH, Arterial: 7.42  pH, Blood: x     /  pCO2: 50    /  pO2: 69    / HCO3: 32    / Base Excess: 6.6   /  SaO2: 96.1            I&O's Summary    2022 07:01  -  2022 07:00  --------------------------------------------------------  IN: 200 mL / OUT: 630 mL / NET: -430 mL      I&O's Detail    2022 07:01  -  2022 07:00  --------------------------------------------------------  IN:    Lactated Ringers: 200 mL  Total IN: 200 mL    OUT:    Indwelling Catheter - Urethral (mL): 630 mL  Total OUT: 630 mL    Total NET: -430 mL          PHYSICAL EXAM:    General/Neuro  Deficits:                             alert & oriented x 2, no focal deficits  Pupils:    Lungs:      clear to auscultation, Normal expansion/effort.     Cardiovascular : S1, S2.  Regular rate and rhythm.  NO Peripheral edema   Cardiac Rhythm: Normal Sinus Rhythm    GI: Abdomen soft, Non-tender, Non-distended.        Extremities: Extremities warm, pink, well-perfused. Pulses: palpable dp b/l    Derm: Good skin turgor, no skin breakdown.      :       Tapia catheter in place.      CXR:     LABS:  CAPILLARY BLOOD GLUCOSE                              8.0    5.22  )-----------( 154      ( 2022 06:00 )             24.8       11-24    145  |  107  |  23<H>  ----------------------------<  63<L>  4.8   |  31  |  1.2    Ca    8.1<L>      2022 06:00  Phos  4.4     11-24  Mg     2.2     11-24    TPro  6.7  /  Alb  2.6<L>  /  TBili  1.2  /  DBili  x   /  AST  24  /  ALT  7   /  AlkPhos  82  11-22      PT/INR - ( 2022 10:40 )   PT: 14.00 sec;   INR: 1.22 ratio         PTT - ( 2022 10:40 )  PTT:35.4 sec  CARDIAC MARKERS ( 2022 06:00 )  x     / 0.05 ng/mL / <7 U/L / x     / 2.0 ng/mL  CARDIAC MARKERS ( 2022 11:54 )  x     / 0.04 ng/mL / 16 U/L / x     / 3.1 ng/mL  CARDIAC MARKERS ( 2022 05:10 )  x     / 0.02 ng/mL / 15 U/L / x     / 2.8 ng/mL  CARDIAC MARKERS ( 2022 10:40 )  x     / 0.02 ng/mL / 60 U/L / x     / x          Urinalysis Basic - ( 2022 14:03 )    Color: Yellow / Appearance: Clear / S.030 / pH: x  Gluc: x / Ketone: Negative  / Bili: Negative / Urobili: 12 mg/dL   Blood: x / Protein: 30 mg/dL / Nitrite: Negative   Leuk Esterase: Negative / RBC: 3 /HPF / WBC 4 /HPF   Sq Epi: x / Non Sq Epi: 4 /HPF / Bacteria: Negative

## 2022-11-24 NOTE — CHART NOTE - NSCHARTNOTEFT_GEN_A_CORE
89y Male w/ acute right acromion fx (vs. displaced distal right clavicular fx) and associated [id number] hematoma, age-indeterminate right 5-6th/ left 10-11th lateral rib fxs, age-indeterminate L2-L4 vertebral body fractures, b/l 11-12th posterior rib fxs, b/l pleural effusions, b/l renal cortical soft tissue masses.    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 with local flap elevation and closure  - plastics consult for drainage from surgical site  - no acute concern for infection of surgical site at this time  - recommend daily xeroform to incision   - Patient taking Keflex 500 BID from office with end date of  - would continue while in house   -AB.42/50/69/32-96%, HF 50/50 placed     RESP:   #age-indeterminate right 5-6th/ left 10-11th lateral rib fxs, age-indeterminate L2-L4 vertebral body fractures, b/l 11-12th posterior rib fxs  - pulling 750cc on IS  - AM CXR  - monitor SpO2    CARDS:   #HTN  - home lisinopril 10mg qd (HOLD for now)  #A-fib w/ sick sinus syndrome (s/p biventricular pacemaker placement; last interrogated 22, no events)  - c/w amiodarone 100mg PO qd  - c/w metoprolol 25mg q12 PO  #HFrEF  -Lasix 20mg IVx1 --> no urine since 6am, tapia placed -> 300cc out   -ECHO: EF 32%, mild MR, modertae TR, mild to moderate AR, mild pulmonary HTN  - Echo (22): EF 35-40%, G1DD, severely enlarged LA  -BNP 12,163  -Ce 0.02, 0.02, 0.04, 0.05, 11 am pending    GI/NUTR:   #Diet  - regular DASH  #GERD  - PPI  #Bowel regimen  - senna    /RENAL:   #urine output in critically ill  - no urine output in 12 hrs --> tapia placed 300cc out  -- Lasix 20mg IVx1   #IV fluids  - IVL    Labs:  BUN/Cr- 25/1.0  -->,  24/1.1  -->23/1.2          Electrolytes-Na 145 // K 4.8 // Mg 2.2 //  Phos 4.4  @ 06:00  Na 142 // K 4.8 // Mg 1.8 //  Phos 3.4  @ 05:10      #CKD stage 3  - lactate 1.7    HEME/ONC:     #DVT prophylaxis    -heparin   Injectable  , SCDs  Labs:   Hb/Hct:  7.7/23.3  -->,  8.7/26.4  -->,  8.0/24.8  -->  Plts:  148  -->,  169  -->,  154  -->              PTT/INR:  35.4/1.22  --->     ID:  WBC- 6.76  --->>,  5.12  --->>  tmax 98.1  Current antibiotics-cephalexin 500 every 12 hours  #COVID positive  - asymptomatic  - ID c/s (Dr. Cross) --> no need to treat covid (since according to daughter, patient tested positive 2-3 weeks ago); f/u inflammatory markers (CRP, ferritin, d-dimer)    ENDO:  POCT Blood Glucose.: 98 mg/dL (2022 10:04)    #Hypothyroidism  - c/w home synthroid     LINES/DRAINS:  PIV    Advanced Directives: Presumed Full Code    HCP/Emergency Contact-    DISPO:    transfer to CEU    f/u  -CE @11 am  -evening labs    full sign out given to primary team 89y Male w/ acute right acromion fx (vs. displaced distal right clavicular fx) and associated 3p1b5cm hematoma, age-indeterminate right 5-6th/ left 10-11th lateral rib fxs, age-indeterminate L2-L4 vertebral body fractures, b/l 11-12th posterior rib fxs, b/l pleural effusions, b/l renal cortical soft tissue masses.    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 with local flap elevation and closure  - plastics consult for drainage from surgical site  - no acute concern for infection of surgical site at this time  - recommend daily xeroform to incision   - Patient taking Keflex 500 BID from office with end date of  - would continue while in house   -AB.42/50/69/32-96%, HF 50/50 placed     RESP:   #age-indeterminate right 5-6th/ left 10-11th lateral rib fxs, age-indeterminate L2-L4 vertebral body fractures, b/l 11-12th posterior rib fxs  - pulling 750cc on IS  - AM CXR  - monitor SpO2    CARDS:   #HTN  - home lisinopril 10mg qd (HOLD for now)  #A-fib w/ sick sinus syndrome (s/p biventricular pacemaker placement; last interrogated 22, no events)  - c/w amiodarone 100mg PO qd  - c/w metoprolol 25mg q12 PO  #HFrEF  -Lasix 20mg IVx1 --> no urine since 6am, tapia placed -> 300cc out   -ECHO: EF 32%, mild MR, modertae TR, mild to moderate AR, mild pulmonary HTN  - Echo (22): EF 35-40%, G1DD, severely enlarged LA  -BNP 12,163  -Ce 0.02, 0.02, 0.04, 0.05, 11 am pending    GI/NUTR:   #Diet  - regular DASH  #GERD  - PPI  #Bowel regimen  - senna    /RENAL:   #urine output in critically ill  - no urine output in 12 hrs --> tapia placed 300cc out  -- Lasix 20mg IVx1   #IV fluids  - IVL    Labs:  BUN/Cr- 25/1.0  -->,  24/1.1  -->23/1.2          Electrolytes-Na 145 // K 4.8 // Mg 2.2 //  Phos 4.4  @ 06:00  Na 142 // K 4.8 // Mg 1.8 //  Phos 3.4  @ 05:10      #CKD stage 3  - lactate 1.7    HEME/ONC:     #DVT prophylaxis    -heparin   Injectable  , SCDs  Labs:   Hb/Hct:  7.7/23.3  -->,  8.7/26.4  -->,  8.0/24.8  -->  Plts:  148  -->,  169  -->,  154  -->              PTT/INR:  35.4/1.22  --->     ID:  WBC- 6.76  --->>,  5.12  --->>  tmax 98.1  Current antibiotics-cephalexin 500 every 12 hours  #COVID positive  - asymptomatic  - ID c/s (Dr. Cross) --> no need to treat covid (since according to daughter, patient tested positive 2-3 weeks ago); f/u inflammatory markers (CRP, ferritin, d-dimer)    ENDO:  POCT Blood Glucose.: 98 mg/dL (2022 10:04)    #Hypothyroidism  - c/w home synthroid     LINES/DRAINS:  PIV    Advanced Directives: Presumed Full Code    HCP/Emergency Contact-    DISPO:    transfer to CEU    f/u  -CE @11 am  -evening labs    full sign out given to primary team Dr Webb @ 10:50am

## 2022-11-24 NOTE — PROGRESS NOTE ADULT - ASSESSMENT
Assessment & Plan  89y Male w/ acute right acromion fx (vs. displaced distal right clavicular fx) and associated [id number] hematoma, age-indeterminate right 5-6th/ left 10-11th lateral rib fxs, age-indeterminate L2-L4 vertebral body fractures, b/l 11-12th posterior rib fxs, b/l pleural effusions, b/l renal cortical soft tissue masses.    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 with local flap elevation and closure  - plastics consult for drainage from surgical site  - no acute concern for infection of surgical site at this time  - recommend daily xeroform to incision   - Patient taking Keflex 500 BID from office with end date of  - would continue while in house   -AB.42/50/69/32-96%, HF 50/50 placed     RESP:   #age-indeterminate right 5-6th/ left 10-11th lateral rib fxs, age-indeterminate L2-L4 vertebral body fractures, b/l 11-12th posterior rib fxs  - pulling 750cc on IS  - AM CXR  - monitor SpO2    CARDS:   #HTN  - home lisinopril 10mg qd (HOLD for now)  #A-fib w/ sick sinus syndrome (s/p biventricular pacemaker placement; last interrogated 22, no events)  - c/w amiodarone 100mg PO qd  - c/w metoprolol 25mg q12 PO  #HFrEF  -Lasix 20mg IVx1 --> no urine since 6am, tapia placed -> 300cc out   -ECHO: EF 32%, mild MR, modertae TR, mild to moderate AR, mild pulmonary HTN  - Echo (22): EF 35-40%, G1DD, severely enlarged LA  -BNP 12,163  -Ce 0.02, 0.02, 0.04, 2000 pending    GI/NUTR:   #Diet  - regular DASH  #GERD  - PPI  #Bowel regimen  - senna    /RENAL:   #urine output in critically ill  - no urine output in 12 hrs --> tapia placed 300cc out  -- Lasix 20mg IVx1   #IV fluids  - IVL    Labs:  BUN/Cr- 25/1.0  -->,  24/1.1  -->          Electrolytes-Na 142 // K 4.8 // Mg 1.8 //  Phos 3.4 ( @ 05:10)    #CKD stage 3  - lactate 1.7    HEME/ONC:     #DVT prophylaxis    -heparin   Injectable  , SCDs  Labs: Hb/Hct:  9.8/29.5  -->,  8.9/27.3  --> 8.7/26.4                     Plts:  191  -->,  168  -->                 PTT/INR:  35.4/1.22  --->     ID:  WBC- 6.76  --->>,  5.12  --->>  Temp trend- 24hrs T(F): 94.5 ( @ 00:00), Max: 95 ( @ 20:25)  Current antibiotics-cephalexin 500 every 12 hours  #COVID positive  - asymptomatic  - ID c/s (Dr. Cross) --> no need to treat covid (since according to daughter, patient tested positive 2-3 weeks ago); f/u inflammatory markers (CRP, ferritin, d-dimer)    ENDO:  POCT Blood Glucose.: 98 mg/dL (2022 10:04)    #Hypothyroidism  - c/w home synthroid     LINES/DRAINS:  PIV    Advanced Directives: Presumed Full Code    HCP/Emergency Contact-    Indication for SICU: respiratory monitoring in the setting of multiple rib fractures and incentive spirometry 750mL    DISPO:    SICU Assessment & Plan  89y Male w/ acute right acromion fx (vs. displaced distal right clavicular fx) and associated [id number] hematoma, age-indeterminate right 5-6th/ left 10-11th lateral rib fxs, age-indeterminate L2-L4 vertebral body fractures, b/l 11-12th posterior rib fxs, b/l pleural effusions, b/l renal cortical soft tissue masses.    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 with local flap elevation and closure  - plastics consult for drainage from surgical site  - no acute concern for infection of surgical site at this time  - recommend daily xeroform to incision   - Patient taking Keflex 500 BID from office with end date of  - would continue while in house   -AB.42/50/69/32-96%, HF 50/50 placed     RESP:   #age-indeterminate right 5-6th/ left 10-11th lateral rib fxs, age-indeterminate L2-L4 vertebral body fractures, b/l 11-12th posterior rib fxs  - pulling 750cc on IS  - AM CXR  - monitor SpO2    CARDS:   #HTN  - home lisinopril 10mg qd (HOLD for now)  #A-fib w/ sick sinus syndrome (s/p biventricular pacemaker placement; last interrogated 22, no events)  - c/w amiodarone 100mg PO qd  - c/w metoprolol 25mg q12 PO  #HFrEF  -Lasix 20mg IVx1 --> no urine since 6am, tapia placed -> 300cc out   -ECHO: EF 32%, mild MR, modertae TR, mild to moderate AR, mild pulmonary HTN  - Echo (22): EF 35-40%, G1DD, severely enlarged LA  -BNP 12,163  -Ce 0.02, 0.02, 0.04, 0.05, 11 am pending    GI/NUTR:   #Diet  - regular DASH  #GERD  - PPI  #Bowel regimen  - senna    /RENAL:   #urine output in critically ill  - no urine output in 12 hrs --> tapia placed 300cc out  -- Lasix 20mg IVx1   #IV fluids  - IVL    Labs:  BUN/Cr- 25/1.0  -->,  24/1.1  -->23/1.2          Electrolytes-Na 145 // K 4.8 // Mg 2.2 //  Phos 4.4  @ 06:00  Na 142 // K 4.8 // Mg 1.8 //  Phos 3.4  @ 05:10      #CKD stage 3  - lactate 1.7    HEME/ONC:     #DVT prophylaxis    -heparin   Injectable  , SCDs  Labs:   Hb/Hct:  7.7/23.3  -->,  8.7/26.4  -->,  8.0/24.8  -->  Plts:  148  -->,  169  -->,  154  -->              PTT/INR:  35.4/1.22  --->     ID:  WBC- 6.76  --->>,  5.12  --->>  tmax 98.1  Current antibiotics-cephalexin 500 every 12 hours  #COVID positive  - asymptomatic  - ID c/s (Dr. Cross) --> no need to treat covid (since according to daughter, patient tested positive 2-3 weeks ago); f/u inflammatory markers (CRP, ferritin, d-dimer)    ENDO:  POCT Blood Glucose.: 98 mg/dL (2022 10:04)    #Hypothyroidism  - c/w home synthroid     LINES/DRAINS:  PIV    Advanced Directives: Presumed Full Code    HCP/Emergency Contact-    Indication for SICU: respiratory monitoring in the setting of multiple rib fractures and incentive spirometry 750mL    DISPO:    transfer to CEU

## 2022-11-25 NOTE — CONSULT NOTE ADULT - ATTENDING COMMENTS
patient seen. admit to sicu.
Critical Care: [id number]   This patient has a high probability of sudden, clinically significant deterioration, which requires the highest level of physician preparedness to intervene urgently. I managed/supervised life or organ supporting interventions that required frequent physician assessment. I devoted my full attention in the ICU to the direct care of this patient for the period of time indicated below. Time I spent with the family or surrogate(s) is included only if the patient was incapable of providing the necessary information or participating in medical decision making. Time devoted to teaching and to any procedures I billed separately is not included.     IDRIS BACON 89y Male admitted to [x ] SICU /[ ] SDU with multiple ribs fractures with COVID+ for past 2 weeks, patient continues to have mild symptoms, high risk for hemodynamic instability, worsening CHF with bilateral pleural effusions  Patient is examined and evaluated at the bedside with SICU team. Treatment plan discussed with SICU team, nurses and primary team.   Chest X-ray and all relevant studies reviewed during rounds.  Will continue hemodynamic monitoring as per protocol in SICU.    Neuro:  GCS [15 ]   [ x]  Neurovascular checks as per SICU protocol                 [ ] 3% NaCl                     Paralysis [ ] Yes  [x ]  No      Sedated/Pain control with                 [ ] Dilaudid drip, [ ]  Ketamine drip, [ ] Fentanyl drip, [ ] Propofol, [ ] Precedex, [ ] Versed drip, [ ] Ativan drip,                           [ ] OxyContin standing,  [ ] OxyContin PRN, [ ] Dilaudid PRN pushes, [ ] Fentanyl PRN pushes, [ ] PCA,                [x ] Tylenol IV/PO, [x ] Gabapentin, [ ]  Ketorolac, [x ] Tramadol,  [x ] Lidoderm Patch       Other Medications               [ ] Seroquel, [ ] Zyprexa, [ ] Haldol,  [ ] Clonazepam [ ] Xanax, [ ] Versed/Ativan PRN, [ ] Valium [ x] None               [ ] Robaxin   [ ] Baclofen  [ ] Flexeril               [ ] Keppra  [ ] Lamictal  [ ] Depakote  [ ] Dilantin               [ ] CIWA (Ativan/Valium/ Librium)  CV: continue to monitor, conservative fluid management, decompensated CHF   On pressors [ ]  Yes  [ x]  No          [ ]  Levophed, [ ] Tahir-Synephrine, [ ] Vasopressin, [ ]  Epinephrine          [ ] Dobutamine, [ ] Milrinone, [ ]  Midodrine,  [ ] Others    Other Cardiac Meds          [ ] Amiodarone IV/PO, [ ] Digoxin, [ ] Cardizem drip, [ ] Cleviprex drip, [ ] Esmolol drip  Respiratory: Acute respiratory insufficiency -> continue monitoring                        None Invasive Support  [ x] Incentive Spirometer                                  [ ] BiPAP   [ ] CPAP/NIV   [ x] HFNC   [ ] NR Face Mask   [ ] NC  [ ] Trach Caller                        Ventilatory support  [ ] Yes [x ] No                            [ ] SBT                                  [ ] PC    [ ] VC   [ ] AC/PRVC   [ ] BiVent/APRV   [ ]CPAP   GI  [x ]  bowel regiment  [ x] BM none [x ] Flatus +             [ ] Ostomy   [ ] NG tube        Prophylaxis [x ] PPI  [ ] H2 Blockers  [ ] Others  Nutrition: continue   [x ] Diet DASH  [ ] TPN/PPN   [ ] calories count   [ ]  Tube Feeds     Renal: Continue I&Os monitoring, Garcia catheter  [ ] Yes,  [x ]   No ,  [ ] Consideration for discontinuation [ x] Taxes cath/PrimaFit  [ ] TOV  [ ] HD/CVVH       Lytes/Acid-base: replete hypokalemia, hypomagnesemia, hypocalcemia, hypophosphatemia        IV Fluids   [x ] LR@75ml/hr,  [ ] NS  [ ] D5W1/2NS [ ] Bicarbonate Drip  [ ] Albumin [ ] Lasix  ID: leukocytosis -> continue to monitor:         IV Abx [ ] Yes, [x ] No;  ID consulted [ ] Yes, [x ] No        Cultures send  [ ] Respiratory   [ ] Blood   [ ] Urine  [ ] Fluids  [ ] Tissue  [ x]  None  Lines:   [ ] Right   [ ] Left  [ ] Bilateral                     [ ] Subclavian TLC        [ ]  Internal Jugular TLC     [ ]  Femoral TLC                     [ ] Subclavian Cordis    [ ] Internal Jugular Cordis   [ ] Femoral Cordis                     [ ] HD catheter    [ ] PICC     [ ]  Midline   [x ] Peripheral IVs                                                 [ ] Right   [ ] Left   [ x] None                     [ ] Radial A-Line    [ ] Femoral A-Line   [ ] Axillary A-Line               Heme: continue to evaluate for acute blood loss anemia- trend Hg/Hct                     AC Reversal Indicated [ ] Yes  [x ] No                                      [ ] Kcentra,  [ ] 3Factors concentrate, [ ] Adnexxa                     Transfused  indicated  [ ] Yes, [x ] No    [ ] PRBCs   [ ] Platelets   [ ] FFPs   [ ] Cryoprecipitate                    Should be started on or continued with  following  [ ] Yes,  [x ] No                               [ ] Lovenox  [ ] Coumadin  [ ] Heparin drip  [ ] NOVACs  [ ] ASA  [ ] Antiplatelets   Endocrine: Prevent and treat hyperglycemia with insulin as needed,                                 Insuline drip [ ] Yes, [x ] No   PV: follow pulse exam  Skin: decub precautions  DVT Prophylaxis:  [x ] SCDs  [ ] Heparin SQ  [x ] Lovenox  SQ  Stress Gastritis Prophylaxis: PPI/H2 Blockers if indicated  Mobility: patient is evaluated at the bedside with mobility team and the goals for today are discussed with PT [x ] out of bed to chair    PATIENT/FAMILY/SURROGATE CONFERENCE:  [x ] Yes with patient at the bedside. [ ] No  PURPOSE: To obtain necessary information, To discuss treatment options under consideration today.    I saw and evaluated the patient personally. I have reviewed and agree with note above. Treatment plan discussed with SICU team, nurses and primary team at the time of the multidisciplinary rounds. The above note is NOT written at the time of rounds and will reflect all changes throughout management of the patient for the day note is written for.    Marisela Fuller MD, FACS  Trauma/ACS/SCC Attending
events noted, sp fall/ rib/ cla fx/ covid +, multiple co morbidities, low EF, diuresis, cardio fup, pain control, palliative care eval,rest of plan per above

## 2022-11-25 NOTE — PROGRESS NOTE ADULT - SUBJECTIVE AND OBJECTIVE BOX
T H I S   I S    N O  T   A    F I N A L I Z E D   N O T LEEANNA BACON, IDRIS  89y, Male  Allergy: No Known Allergies    Hospital Day: 3d    Patient seen and examined earlier today.     PMH/PSH:  PAST MEDICAL & SURGICAL HISTORY:  Heart disease      Hypertension      Skin cancer      Presence of automatic cardioverter/defibrillator (AICD)      History of open heart surgery      FHx: skin cancer          LAST 24-Hr EVENTS:    VITALS:  T(F): 98.2 (11-25-22 @ 04:04), Max: 98.6 (11-24-22 @ 15:21)  HR: 69 (11-25-22 @ 04:04)  BP: 112/57 (11-25-22 @ 04:04) (106/57 - 120/63)  RR: 20 (11-25-22 @ 04:04)  SpO2: 100% (11-25-22 @ 04:04)          TESTS & MEASUREMENTS:  Weight/BMI      11-23-22 @ 07:01  -  11-24-22 @ 07:00  --------------------------------------------------------  IN: 200 mL / OUT: 630 mL / NET: -430 mL    11-24-22 @ 07:01  -  11-25-22 @ 07:00  --------------------------------------------------------  IN: 420 mL / OUT: 450 mL / NET: -30 mL                            8.0    5.76  )-----------( 158      ( 25 Nov 2022 01:51 )             24.6       INR: 1.22 ratio (11-22-22 @ 10:40)    11-25    146  |  108  |  24<H>  ----------------------------<  76  4.4   |  33<H>  |  1.0    Ca    8.1<L>      25 Nov 2022 11:34  Phos  3.8     11-24  Mg     2.1     11-24    TPro  5.2<L>  /  Alb  2.2<L>  /  TBili  0.8  /  DBili  x   /  AST  12  /  ALT  6   /  AlkPhos  69  11-25    LIVER FUNCTIONS - ( 25 Nov 2022 11:34 )  Alb: 2.2 g/dL / Pro: 5.2 g/dL / ALK PHOS: 69 U/L / ALT: 6 U/L / AST: 12 U/L / GGT: x           CARDIAC MARKERS ( 24 Nov 2022 12:21 )  x     / 0.05 ng/mL / x     / x     / x      CARDIAC MARKERS ( 24 Nov 2022 06:00 )  x     / 0.05 ng/mL / <7 U/L / x     / 2.0 ng/mL                Serum Pro-Brain Natriuretic Peptide: 55671 pg/mL (11-23-22 @ 11:54)    COVID-19 PCR: Detected (11-22-22 @ 10:40)        A1C with Estimated Average Glucose Result: 5.4 % (05-09-22 @ 07:10)      Indwelling Urethral Catheter:     Connect To:  Straight Drainage/Gravity    Indication:  Urine Output Monitoring in Critically Ill (11-25-22 @ 08:38) (not performed)  Indwelling Urethral Catheter:     Connect To:  Straight Drainage/Gravity    Indication:  Urine Output Monitoring in Critically Ill (11-23-22 @ 17:35) (not performed)      RADIOLOGY, ECG, & ADDITIONAL TESTS:  12 Lead ECG:   Ventricular Rate 75 BPM    Atrial Rate 94 BPM    QRS Duration 152 ms    Q-T Interval 492 ms    QTC Calculation(Bazett) 549 ms    R Axis 249 degrees    T Axis 50 degrees    Diagnosis Line Ventricular-paced rhythm    Abnormal ECG    Confirmed by ARTHUR NANCE MD ([id number]) on 11/23/2022 7:03:57 AM (11-23-22 @ 04:30)    CT Head No Cont:   ACC: [id number] EXAM:  CT BRAIN                          PROCEDURE DATE:  11/22/2022          INTERPRETATION:  Clinical History / Reason for exam: Trauma    Technique: Noncontrast head CT.  Contiguous unenhanced CT axial images of   the head from thebase to the vertex with coronal and sagittal reformats.    Comparison: CT head dated 6/25/2022.    Findings:    The ventricles and cortical sulci are within normal limits for age.    There are stable patchy and confluent hypodensities throughout the   hemispheric white matter without mass effect compatible with chronic   microvascular changes.    There is no acute intracranial hemorrhage, extra-axial fluid collection   or midline shift.  Gray white matter differentiation is maintained.    Vascular calcifications are noted.    The visualized paranasal sinuses and mastoids are clear.    IMPRESSION:    1.  No evidence of acute intracranial pathology. Stable exam since   6/25/2022.    2.  Stable moderate/severe chronic microvascular changes.    --- End of Report ---            RAFAEL CUBA MD; Attending Radiologist  This document has been electronically signed. Nov 22 2022 11:25AM (11-22-22 @ 11:09)    RECENT DIAGNOSTIC ORDERS:  Folate, Serum: 11:00 (11-25-22 @ 10:32)  Vitamin B12, Serum: 11:00 (11-25-22 @ 10:32)  Type + Screen: 23:30 (11-25-22 @ 10:32)  Magnesium, Serum: 23:30 (11-25-22 @ 10:32)  Comprehensive Metabolic Panel: 23:30 (11-25-22 @ 10:32)  Complete Blood Count: 23:30 (11-25-22 @ 10:32)  VA Duplex Lower Ext Vein Scan, Bilat: 10:18 (11-25-22 @ 10:22)  Lactate Dehydrogenase, Serum: 23:30 (11-25-22 @ 09:21)  C-Reactive Protein, Serum: 23:30 (11-25-22 @ 09:21)  Procalcitonin, Serum: 23:30 (11-25-22 @ 09:21)  D-Dimer Assay, Quantitative:  Start Date:25-Nov-2022. 23:30 (11-25-22 @ 09:21)  Ferritin, Serum: 23:30 (11-25-22 @ 09:21)  Folate, Serum: 23:30 (11-25-22 @ 09:17)  Vitamin B12, Serum: 23:30 (11-25-22 @ 09:17)      MEDICATIONS:  MEDICATIONS  (STANDING):  acetaminophen     Tablet .. 650 milliGRAM(s) Oral every 6 hours  aMIOdarone    Tablet 100 milliGRAM(s) Oral daily  cephalexin 500 milliGRAM(s) Oral every 12 hours  chlorhexidine 2% Cloths 1 Application(s) Topical every 12 hours  cyanocobalamin 1000 MICROGram(s) Oral daily  divalproex  milliGRAM(s) Oral every 12 hours  divalproex  milliGRAM(s) Oral at bedtime  furosemide   Injectable 40 milliGRAM(s) IV Push once  gabapentin 100 milliGRAM(s) Oral three times a day  heparin   Injectable 5000 Unit(s) SubCutaneous every 8 hours  levothyroxine 25 MICROGram(s) Oral daily  lidocaine   4% Patch 1 Patch Transdermal daily  melatonin 5 milliGRAM(s) Oral at bedtime  memantine 5 milliGRAM(s) Oral daily  metoprolol tartrate 25 milliGRAM(s) Oral every 12 hours  pantoprazole    Tablet 40 milliGRAM(s) Oral before breakfast  senna 2 Tablet(s) Oral at bedtime    MEDICATIONS  (PRN):      HOME MEDICATIONS:  amiodarone 100 mg oral tablet (11-14)  Aspirin Low Dose 81 mg oral tablet, chewable (11-14)  benazepril 20 mg oral tablet (11-14)  cephalexin 500 mg oral tablet (11-14)  cyanocobalamin 100 mcg oral tablet (11-14)  levothyroxine 25 mcg (0.025 mg) oral tablet (11-14)  Melatonin 5 mg oral tablet (11-14)  memantine 5 mg oral tablet (11-14)  metoprolol tartrate 25 mg oral tablet (11-14)  omeprazole 20 mg oral delayed release capsule (11-14)  Vitamin B12 50 mcg oral tablet (11-14)      PHYSICAL EXAM:  GENERAL:   CHEST/LUNG:   HEART:   ABDOMEN:   EXTREMITIES:               ARLEEN, IDRIS  89y, Male  Allergy: No Known Allergies    Hospital Day: 3d    Patient seen and examined earlier today. he is sleepy but easy arousable, he stated that he feels fine , his daughter at bedside     PMH/PSH:  PAST MEDICAL & SURGICAL HISTORY:  Heart disease      Hypertension      Skin cancer      Presence of automatic cardioverter/defibrillator (AICD)      History of open heart surgery      FHx: skin cancer          LAST 24-Hr EVENTS:    VITALS:  T(F): 98.2 (11-25-22 @ 04:04), Max: 98.6 (11-24-22 @ 15:21)  HR: 69 (11-25-22 @ 04:04)  BP: 112/57 (11-25-22 @ 04:04) (106/57 - 120/63)  RR: 20 (11-25-22 @ 04:04)  SpO2: 100% (11-25-22 @ 04:04)          TESTS & MEASUREMENTS:  Weight/BMI      11-23-22 @ 07:01  -  11-24-22 @ 07:00  --------------------------------------------------------  IN: 200 mL / OUT: 630 mL / NET: -430 mL    11-24-22 @ 07:01  -  11-25-22 @ 07:00  --------------------------------------------------------  IN: 420 mL / OUT: 450 mL / NET: -30 mL                            8.0    5.76  )-----------( 158      ( 25 Nov 2022 01:51 )             24.6       INR: 1.22 ratio (11-22-22 @ 10:40)    11-25    146  |  108  |  24<H>  ----------------------------<  76  4.4   |  33<H>  |  1.0    Ca    8.1<L>      25 Nov 2022 11:34  Phos  3.8     11-24  Mg     2.1     11-24    TPro  5.2<L>  /  Alb  2.2<L>  /  TBili  0.8  /  DBili  x   /  AST  12  /  ALT  6   /  AlkPhos  69  11-25    LIVER FUNCTIONS - ( 25 Nov 2022 11:34 )  Alb: 2.2 g/dL / Pro: 5.2 g/dL / ALK PHOS: 69 U/L / ALT: 6 U/L / AST: 12 U/L / GGT: x           CARDIAC MARKERS ( 24 Nov 2022 12:21 )  x     / 0.05 ng/mL / x     / x     / x      CARDIAC MARKERS ( 24 Nov 2022 06:00 )  x     / 0.05 ng/mL / <7 U/L / x     / 2.0 ng/mL                Serum Pro-Brain Natriuretic Peptide: 41294 pg/mL (11-23-22 @ 11:54)    COVID-19 PCR: Detected (11-22-22 @ 10:40)        A1C with Estimated Average Glucose Result: 5.4 % (05-09-22 @ 07:10)      Indwelling Urethral Catheter:     Connect To:  Straight Drainage/Gravity    Indication:  Urine Output Monitoring in Critically Ill (11-25-22 @ 08:38) (not performed)  Indwelling Urethral Catheter:     Connect To:  Straight Drainage/Gravity    Indication:  Urine Output Monitoring in Critically Ill (11-23-22 @ 17:35) (not performed)      RADIOLOGY, ECG, & ADDITIONAL TESTS:  12 Lead ECG:   Ventricular Rate 75 BPM    Atrial Rate 94 BPM    QRS Duration 152 ms    Q-T Interval 492 ms    QTC Calculation(Bazett) 549 ms    R Axis 249 degrees    T Axis 50 degrees    Diagnosis Line Ventricular-paced rhythm    Abnormal ECG    Confirmed by ARTHUR NANCE MD ([id number]) on 11/23/2022 7:03:57 AM (11-23-22 @ 04:30)    CT Head No Cont:   ACC: [id number] EXAM:  CT BRAIN                          PROCEDURE DATE:  11/22/2022          INTERPRETATION:  Clinical History / Reason for exam: Trauma    Technique: Noncontrast head CT.  Contiguous unenhanced CT axial images of   the head from thebase to the vertex with coronal and sagittal reformats.    Comparison: CT head dated 6/25/2022.    Findings:    The ventricles and cortical sulci are within normal limits for age.    There are stable patchy and confluent hypodensities throughout the   hemispheric white matter without mass effect compatible with chronic   microvascular changes.    There is no acute intracranial hemorrhage, extra-axial fluid collection   or midline shift.  Gray white matter differentiation is maintained.    Vascular calcifications are noted.    The visualized paranasal sinuses and mastoids are clear.    IMPRESSION:    1.  No evidence of acute intracranial pathology. Stable exam since   6/25/2022.    2.  Stable moderate/severe chronic microvascular changes.    --- End of Report ---            RAFAEL CUBA MD; Attending Radiologist  This document has been electronically signed. Nov 22 2022 11:25AM (11-22-22 @ 11:09)    RECENT DIAGNOSTIC ORDERS:  Folate, Serum: 11:00 (11-25-22 @ 10:32)  Vitamin B12, Serum: 11:00 (11-25-22 @ 10:32)  Type + Screen: 23:30 (11-25-22 @ 10:32)  Magnesium, Serum: 23:30 (11-25-22 @ 10:32)  Comprehensive Metabolic Panel: 23:30 (11-25-22 @ 10:32)  Complete Blood Count: 23:30 (11-25-22 @ 10:32)  VA Duplex Lower Ext Vein Scan, Bilat: 10:18 (11-25-22 @ 10:22)  Lactate Dehydrogenase, Serum: 23:30 (11-25-22 @ 09:21)  C-Reactive Protein, Serum: 23:30 (11-25-22 @ 09:21)  Procalcitonin, Serum: 23:30 (11-25-22 @ 09:21)  D-Dimer Assay, Quantitative:  Start Date:25-Nov-2022. 23:30 (11-25-22 @ 09:21)  Ferritin, Serum: 23:30 (11-25-22 @ 09:21)  Folate, Serum: 23:30 (11-25-22 @ 09:17)  Vitamin B12, Serum: 23:30 (11-25-22 @ 09:17)      MEDICATIONS:  MEDICATIONS  (STANDING):  acetaminophen     Tablet .. 650 milliGRAM(s) Oral every 6 hours  aMIOdarone    Tablet 100 milliGRAM(s) Oral daily  cephalexin 500 milliGRAM(s) Oral every 12 hours  chlorhexidine 2% Cloths 1 Application(s) Topical every 12 hours  cyanocobalamin 1000 MICROGram(s) Oral daily  divalproex  milliGRAM(s) Oral every 12 hours  divalproex  milliGRAM(s) Oral at bedtime  furosemide   Injectable 40 milliGRAM(s) IV Push once  gabapentin 100 milliGRAM(s) Oral three times a day  heparin   Injectable 5000 Unit(s) SubCutaneous every 8 hours  levothyroxine 25 MICROGram(s) Oral daily  lidocaine   4% Patch 1 Patch Transdermal daily  melatonin 5 milliGRAM(s) Oral at bedtime  memantine 5 milliGRAM(s) Oral daily  metoprolol tartrate 25 milliGRAM(s) Oral every 12 hours  pantoprazole    Tablet 40 milliGRAM(s) Oral before breakfast  senna 2 Tablet(s) Oral at bedtime    MEDICATIONS  (PRN):      HOME MEDICATIONS:  amiodarone 100 mg oral tablet (11-14)  Aspirin Low Dose 81 mg oral tablet, chewable (11-14)  benazepril 20 mg oral tablet (11-14)  cephalexin 500 mg oral tablet (11-14)  cyanocobalamin 100 mcg oral tablet (11-14)  levothyroxine 25 mcg (0.025 mg) oral tablet (11-14)  Melatonin 5 mg oral tablet (11-14)  memantine 5 mg oral tablet (11-14)  metoprolol tartrate 25 mg oral tablet (11-14)  omeprazole 20 mg oral delayed release capsule (11-14)  Vitamin B12 50 mcg oral tablet (11-14)      PHYSICAL EXAM:  GENERAL: sleepy but easy araousable , follow simple command, frail, left side of face skin lesions with dressing , left eye discharge   CHEST/LUNG:  decrease breath sound b/l   HEART:  regular rhythm   ABDOMEN:  soft, non tender   EXTREMITIES:  no edema

## 2022-11-25 NOTE — PROGRESS NOTE ADULT - SUBJECTIVE AND OBJECTIVE BOX
ICD interrogation  [employer]onic Viva XT CRT-D  Chronic atrial fibrillation (100%)  Excellent device sensing and pacing  6 months left on the battery - more frequent follow up needed  The patient follows with Dr. River  The patient is intermittently device dependant  Total  92.6%  Excellent CRT pacing - 100%  Device evidence of congestive heart failure / fluid accumulation

## 2022-11-25 NOTE — CONSULT NOTE ADULT - ASSESSMENT
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 Pain control. Lumbar brace.     HEENT: Oral care. Plastic surgery follow up. wound care     PULMONARY:  HOB @ 45 degrees.  Aspiration precautions. Wean O2, Incentive spirometry     CARDIOVASCULAR: keep MAP >60. Lasix 40 mg STAT. Rate control      GI: GI prophylaxis.  Feeding.  Bowel regimen     RENAL:  Follow up lytes.  Correct as needed    INFECTIOUS DISEASE: Monitor off antibiotics    HEMATOLOGICAL:  DVT prophylaxis. Anemia workup . monitor CBC     ENDOCRINE:  Follow up FS.  Insulin protocol if needed.    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 Pain control. Lumbar brace.     HEENT: Oral care. Plastic surgery follow up. wound care     PULMONARY:  HOB @ 45 degrees.  Aspiration precautions. Wean O2, Incentive spirometry.     CARDIOVASCULAR: keep MAP >60. Lasix 40 mg STAT. Rate control      GI: GI prophylaxis.  Feeding.  Bowel regimen     RENAL:  Follow up lytes.  Correct as needed    INFECTIOUS DISEASE: Monitor off antibiotics. Trend IM. f/u ID    HEMATOLOGICAL:  DVT prophylaxis. Anemia workup . monitor CBC     ENDOCRINE:  Follow up FS.  Insulin protocol if needed.    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 Pain control. Lumbar brace.     HEENT: Oral care. Plastic surgery follow up. wound care     PULMONARY:  HOB @ 45 degrees.  Aspiration precautions. Wean O2, Incentive spirometry. keep SAo2 92 to 96%    CARDIOVASCULAR: keep MAP >60. Lasix 40 mg STAT. Rate control      GI: GI prophylaxis.  Feeding.  Bowel regimen     RENAL:  Follow up lytes.  Correct as needed    INFECTIOUS DISEASE: abx per  ID, Fup inlf markers    HEMATOLOGICAL:  DVT prophylaxis. Anemia workup . monitor CBC     ENDOCRINE:  Follow up FS.  Insulin protocol if needed.    MUSCULOSKELETAL: OOBC  / PT / OT    SDU

## 2022-11-25 NOTE — CONSULT NOTE ADULT - SUBJECTIVE AND OBJECTIVE BOX
Patient is a 89y old  Male who presents with a chief complaint of rib fractures (24 Nov 2022 06:02)      HPI:  TRAUMA ACTIVATION LEVEL: CONSULT  ACTIVATED BY: ED  INTUBATED: NO      MECHANISM OF INJURY:   [] Blunt     [] MVC	  [x] Fall	  [] Pedestrian Struck	  [] Motorcycle     [] Assault     [] Bicycle collision    [] Sports injury    [] Penetrating    [] Gun Shot Wound      [] Stab Wound    GCS: 14 	E: 4	V: 5	M: 5    HPI:    89yM w/ PMHx of Squamous cell carcinoma s/p resection and local flap with Dr. Doe 11/14/22, CAD s/p CABG 25 years ago, Afib and sick sinus syndrome s/p biventricular pacemaker and ICD, HTN, CKD3, GERD, HFrEF, lymphoma being followed by patient's oncologist seen as Trauma consult s/p unwitnessed fall at nursing home .  Trauma assessment in ED: ABCs intact , GCS 14 , AAOx0. Patient brought in by EMS, daughter at bedside to give history. Patient has history of frequent falls and is not on anticoagulation for that reason. She states his  baseline mental status is AAOx1-2, but he seemed more confused to her today. CTH negative, CT-Cspine negative. CT chest/abdomen/pelvis revealing R acromion fracture seen on CT vs. distal clavicular fracture on XR with associated 4x3 cm hematoma, b/l moderate-large pleural effusions, acute nondisplaced fractures of b/l 11-12 posterior ribs, age-indeterminate right 5-6 lateral rib fractures and left 10-11 lateral rib fractures, age-indeterminate L2-L4 compression deformities.   Non-traumatic injuries identified: heterogenous b/l renal cortical soft tissue masses suspicious of neoplasm, thickening of left anteriolateral bladder wall, cholelithiasis, fat-containing left inguinal hernia. (22 Nov 2022 14:42)      PAST MEDICAL & SURGICAL HISTORY:  Heart disease      Hypertension      Skin cancer      Presence of automatic cardioverter/defibrillator (AICD)      History of open heart surgery      FHx: skin cancer          SOCIAL HX:   Smoking                         ETOH                            Other    FAMILY HISTORY:  :  No known cardiovacular family hisotry     Review Of Systems:     All ROS are negative except per HPI       Allergies    No Known Allergies    Intolerances          PHYSICAL EXAM    ICU Vital Signs Last 24 Hrs  T(C): 36.8 (25 Nov 2022 04:04), Max: 37 (24 Nov 2022 11:34)  T(F): 98.2 (25 Nov 2022 04:04), Max: 98.6 (24 Nov 2022 11:34)  HR: 69 (25 Nov 2022 04:04) (69 - 71)  BP: 112/57 (25 Nov 2022 04:04) (106/57 - 125/59)  BP(mean): 81 (24 Nov 2022 11:34) (81 - 85)  ABP: --  ABP(mean): --  RR: 20 (25 Nov 2022 04:04) (18 - 22)  SpO2: 100% (25 Nov 2022 04:04) (93% - 100%)    O2 Parameters below as of 25 Nov 2022 04:04  Patient On (Oxygen Delivery Method): nasal cannula, high flow            CONSTITUTIONAL:  Well nourished.   NAD    ENT:   Airway patent,   Mouth with normal mucosa.   No thrush      CARDIAC:   Normal rate,   Regular rhythm.    No edema      Vascular:   normal systolic impulse  no bruits    RESPIRATORY:   No wheezing  Bilateral BS   Not tachypneic,  No use of accessory muscles    GASTROINTESTINAL:  Abdomen soft,   Non-tender,   No guarding,   + BS      NEUROLOGICAL:   Alert and oriented   No motor deficits.    SKIN:   Skin normal color for race,   No evidence of rash.      HEME LYMPH: .  No cervical  lymphadenopathy.  No inguinal lymphadenopathy            11-24-22 @ 07:01  -  11-25-22 @ 07:00  --------------------------------------------------------  IN:    Oral Fluid: 420 mL  Total IN: 420 mL    OUT:    Indwelling Catheter - Urethral (mL): 450 mL  Total OUT: 450 mL    Total NET: -30 mL          LABS:                          8.0    5.76  )-----------( 158      ( 25 Nov 2022 01:51 )             24.6                                               11-24    143  |  106  |  23<H>  ----------------------------<  111<H>  4.3   |  33<H>  |  1.1    Ca    8.0<L>      24 Nov 2022 20:41  Phos  3.8     11-24  Mg     2.1     11-24                                                   CARDIAC MARKERS ( 24 Nov 2022 12:21 )  x     / 0.05 ng/mL / x     / x     / x      CARDIAC MARKERS ( 24 Nov 2022 06:00 )  x     / 0.05 ng/mL / <7 U/L / x     / 2.0 ng/mL  CARDIAC MARKERS ( 23 Nov 2022 11:54 )  x     / 0.04 ng/mL / 16 U/L / x     / 3.1 ng/mL                                                                                                                                                                      ABG - ( 23 Nov 2022 09:18 )  pH, Arterial: 7.42  pH, Blood: x     /  pCO2: 50    /  pO2: 69    / HCO3: 32    / Base Excess: 6.6   /  SaO2: 96.1                X-Rays reviewed                                                                                     ECHO    CXR interpreted by me     MEDICATIONS  (STANDING):  acetaminophen     Tablet .. 650 milliGRAM(s) Oral every 6 hours  aMIOdarone    Tablet 100 milliGRAM(s) Oral daily  cephalexin 500 milliGRAM(s) Oral every 12 hours  chlorhexidine 2% Cloths 1 Application(s) Topical every 12 hours  cyanocobalamin 1000 MICROGram(s) Oral daily  divalproex  milliGRAM(s) Oral every 12 hours  divalproex  milliGRAM(s) Oral at bedtime  gabapentin 100 milliGRAM(s) Oral three times a day  heparin   Injectable 5000 Unit(s) SubCutaneous every 8 hours  levothyroxine 25 MICROGram(s) Oral daily  lidocaine   4% Patch 1 Patch Transdermal daily  melatonin 5 milliGRAM(s) Oral at bedtime  memantine 5 milliGRAM(s) Oral daily  metoprolol tartrate 25 milliGRAM(s) Oral every 12 hours  pantoprazole    Tablet 40 milliGRAM(s) Oral before breakfast  senna 2 Tablet(s) Oral at bedtime    MEDICATIONS  (PRN):   Patient is a 89y old  Male who presents with a chief complaint of rib fractures (24 Nov 2022 06:02)      HPI:  TRAUMA ACTIVATION LEVEL: CONSULT  ACTIVATED BY: ED  INTUBATED: NO      MECHANISM OF INJURY:   [] Blunt     [] MVC	  [x] Fall	  [] Pedestrian Struck	  [] Motorcycle     [] Assault     [] Bicycle collision    [] Sports injury    [] Penetrating    [] Gun Shot Wound      [] Stab Wound    GCS: 14 	E: 4	V: 5	M: 5    HPI:    89yM w/ PMHx of Squamous cell carcinoma s/p resection and local flap with Dr. Doe 11/14/22, CAD s/p CABG 25 years ago, Afib and sick sinus syndrome s/p biventricular pacemaker and ICD, HTN, CKD3, GERD, HFrEF, lymphoma being followed by patient's oncologist seen as Trauma consult s/p unwitnessed fall at nursing home .  Trauma assessment in ED: ABCs intact , GCS 14 , AAOx0. Patient brought in by EMS, daughter at bedside to give history. Patient has history of frequent falls and is not on anticoagulation for that reason. She states his  baseline mental status is AAOx1-2, but he seemed more confused to her today. CTH negative, CT-Cspine negative. CT chest/abdomen/pelvis revealing R acromion fracture seen on CT vs. distal clavicular fracture on XR with associated 4x3 cm hematoma, b/l moderate-large pleural effusions, acute nondisplaced fractures of b/l 11-12 posterior ribs, age-indeterminate right 5-6 lateral rib fractures and left 10-11 lateral rib fractures, age-indeterminate L2-L4 compression deformities.   Non-traumatic injuries identified: heterogenous b/l renal cortical soft tissue masses suspicious of neoplasm, thickening of left anteriolateral bladder wall, cholelithiasis, fat-containing left inguinal hernia. (22 Nov 2022 14:42)      PAST MEDICAL & SURGICAL HISTORY:  Heart disease      Hypertension      Skin cancer      Presence of automatic cardioverter/defibrillator (AICD)      History of open heart surgery      FHx: skin cancer          SOCIAL HX:   Smoking                         ETOH                            Other    FAMILY HISTORY:  :  No known cardiovacular family hisotry     Review Of Systems:     All ROS are negative except per HPI       Allergies    No Known Allergies    Intolerances          PHYSICAL EXAM    ICU Vital Signs Last 24 Hrs  T(C): 36.8 (25 Nov 2022 04:04), Max: 37 (24 Nov 2022 11:34)  T(F): 98.2 (25 Nov 2022 04:04), Max: 98.6 (24 Nov 2022 11:34)  HR: 69 (25 Nov 2022 04:04) (69 - 71)  BP: 112/57 (25 Nov 2022 04:04) (106/57 - 125/59)  BP(mean): 81 (24 Nov 2022 11:34) (81 - 85)  ABP: --  ABP(mean): --  RR: 20 (25 Nov 2022 04:04) (18 - 22)  SpO2: 100% (25 Nov 2022 04:04) (93% - 100%)    O2 Parameters below as of 25 Nov 2022 04:04  Patient On (Oxygen Delivery Method): nasal cannula, high flow            CONSTITUTIONAL:  NAD    ENT:   Airway patent,   Mouth with normal mucosa.       CARDIAC:   Normal rate,   Regular rhythm.    No edema      Vascular:   normal systolic impulse  no bruits    RESPIRATORY:   No wheezing  Bilateral crackles  Not tachypneic,  No use of accessory muscles    GASTROINTESTINAL:  Abdomen soft,   Non-tender,   No guarding,   + BS      NEUROLOGICAL:   Alert and oriented   No motor deficits.    SKIN:   Skin normal color for race,   No evidence of rash.              11-24-22 @ 07:01  -  11-25-22 @ 07:00  --------------------------------------------------------  IN:    Oral Fluid: 420 mL  Total IN: 420 mL    OUT:    Indwelling Catheter - Urethral (mL): 450 mL  Total OUT: 450 mL    Total NET: -30 mL          LABS:                          8.0    5.76  )-----------( 158      ( 25 Nov 2022 01:51 )             24.6                                               11-24    143  |  106  |  23<H>  ----------------------------<  111<H>  4.3   |  33<H>  |  1.1    Ca    8.0<L>      24 Nov 2022 20:41  Phos  3.8     11-24  Mg     2.1     11-24                                                   CARDIAC MARKERS ( 24 Nov 2022 12:21 )  x     / 0.05 ng/mL / x     / x     / x      CARDIAC MARKERS ( 24 Nov 2022 06:00 )  x     / 0.05 ng/mL / <7 U/L / x     / 2.0 ng/mL  CARDIAC MARKERS ( 23 Nov 2022 11:54 )  x     / 0.04 ng/mL / 16 U/L / x     / 3.1 ng/mL                                                                                                                                                                      ABG - ( 23 Nov 2022 09:18 )  pH, Arterial: 7.42  pH, Blood: x     /  pCO2: 50    /  pO2: 69    / HCO3: 32    / Base Excess: 6.6   /  SaO2: 96.1                X-Rays reviewed                                                                                     ECHO    CXR interpreted by me     MEDICATIONS  (STANDING):  acetaminophen     Tablet .. 650 milliGRAM(s) Oral every 6 hours  aMIOdarone    Tablet 100 milliGRAM(s) Oral daily  cephalexin 500 milliGRAM(s) Oral every 12 hours  chlorhexidine 2% Cloths 1 Application(s) Topical every 12 hours  cyanocobalamin 1000 MICROGram(s) Oral daily  divalproex  milliGRAM(s) Oral every 12 hours  divalproex  milliGRAM(s) Oral at bedtime  gabapentin 100 milliGRAM(s) Oral three times a day  heparin   Injectable 5000 Unit(s) SubCutaneous every 8 hours  levothyroxine 25 MICROGram(s) Oral daily  lidocaine   4% Patch 1 Patch Transdermal daily  melatonin 5 milliGRAM(s) Oral at bedtime  memantine 5 milliGRAM(s) Oral daily  metoprolol tartrate 25 milliGRAM(s) Oral every 12 hours  pantoprazole    Tablet 40 milliGRAM(s) Oral before breakfast  senna 2 Tablet(s) Oral at bedtime    MEDICATIONS  (PRN):   Patient is a 89y old  Male who presents with a chief complaint of rib fractures (24 Nov 2022 06:02)    89yM w/ PMHx of Squamous cell carcinoma s/p resection and local flap with Dr. Doe 11/14/22, CAD s/p CABG 25 years ago, Afib and sick sinus syndrome s/p biventricular pacemaker and ICD, HTN, CKD3, GERD, HFrEF, lymphoma being followed by patient's oncologist seen as Trauma consult s/p unwitnessed fall at nursing home .  Trauma assessment in ED: ABCs intact , GCS 14 , AAOx0. Patient brought in by EMS, daughter at bedside to give history. Patient has history of frequent falls and is not on anticoagulation for that reason. She states his  baseline mental status is AAOx1-2, but he seemed more confused to her today. CTH negative, CT-Cspine negative. CT chest/abdomen/pelvis revealing R acromion fracture seen on CT vs. distal clavicular fracture on XR with associated 4x3 cm hematoma, b/l moderate-large pleural effusions, acute nondisplaced fractures of b/l 11-12 posterior ribs, age-indeterminate right 5-6 lateral rib fractures and left 10-11 lateral rib fractures, age-indeterminate L2-L4 compression deformities.   Non-traumatic injuries identified: heterogenous b/l renal cortical soft tissue masses suspicious of neoplasm, thickening of left anteriolateral bladder wall, cholelithiasis, fat-containing left inguinal hernia. (22 Nov 2022 14:42)  admitted to SICU, seen by ortho. neuro sx, ID, called to evaluate, this am on HHFNC      PAST MEDICAL & SURGICAL HISTORY:  Heart disease      Hypertension      Skin cancer      Presence of automatic cardioverter/defibrillator (AICD)      History of open heart surgery      FHx: skin cancer          SOCIAL HX:   Smoking UTO    FAMILY HISTORY: UTO    Review Of Systems: UTO      Allergies    No Known Allergies          PHYSICAL EXAM    ICU Vital Signs Last 24 Hrs  T(C): 36.8 (25 Nov 2022 04:04), Max: 37 (24 Nov 2022 11:34)  T(F): 98.2 (25 Nov 2022 04:04), Max: 98.6 (24 Nov 2022 11:34)  HR: 69 (25 Nov 2022 04:04) (69 - 71)  BP: 112/57 (25 Nov 2022 04:04) (106/57 - 125/59)  BP(mean): 81 (24 Nov 2022 11:34) (81 - 85)  RR: 20 (25 Nov 2022 04:04) (18 - 22)  SpO2: 100% (25 Nov 2022 04:04) (93% - 100%)    O2 Parameters below as of 25 Nov 2022 04:04  Patient On (Oxygen Delivery Method): nasal cannula, high flow            CONSTITUTIONAL:  chronically ill looking      CARDIAC:     BENJAMIN 2.6    RESPIRATORY:   DEC BS BOTH BASES    GASTROINTESTINAL:  Abdomen soft,   Non-tender,   No guarding,   + BS      NEUROLOGICAL:   non focal  not following commands              11-24-22 @ 07:01  -  11-25-22 @ 07:00  --------------------------------------------------------  IN:    Oral Fluid: 420 mL  Total IN: 420 mL    OUT:    Indwelling Catheter - Urethral (mL): 450 mL  Total OUT: 450 mL    Total NET: -30 mL          LABS:                          8.0    5.76  )-----------( 158      ( 25 Nov 2022 01:51 )             24.6                                               11-24    143  |  106  |  23<H>  ----------------------------<  111<H>  4.3   |  33<H>  |  1.1    Ca    8.0<L>      24 Nov 2022 20:41  Phos  3.8     11-24  Mg     2.1     11-24                                                   CARDIAC MARKERS ( 24 Nov 2022 12:21 )  x     / 0.05 ng/mL / x     / x     / x      CARDIAC MARKERS ( 24 Nov 2022 06:00 )  x     / 0.05 ng/mL / <7 U/L / x     / 2.0 ng/mL  CARDIAC MARKERS ( 23 Nov 2022 11:54 )  x     / 0.04 ng/mL / 16 U/L / x     / 3.1 ng/mL                                                                                                                                                                      ABG - ( 23 Nov 2022 09:18 )  pH, Arterial: 7.42  pH, Blood: x     /  pCO2: 50    /  pO2: 69    / HCO3: 32    / Base Excess: 6.6   /  SaO2: 96.1            MEDICATIONS  (STANDING):  acetaminophen     Tablet .. 650 milliGRAM(s) Oral every 6 hours  aMIOdarone    Tablet 100 milliGRAM(s) Oral daily  cephalexin 500 milliGRAM(s) Oral every 12 hours  chlorhexidine 2% Cloths 1 Application(s) Topical every 12 hours  cyanocobalamin 1000 MICROGram(s) Oral daily  divalproex  milliGRAM(s) Oral every 12 hours  divalproex  milliGRAM(s) Oral at bedtime  gabapentin 100 milliGRAM(s) Oral three times a day  heparin   Injectable 5000 Unit(s) SubCutaneous every 8 hours  levothyroxine 25 MICROGram(s) Oral daily  lidocaine   4% Patch 1 Patch Transdermal daily  melatonin 5 milliGRAM(s) Oral at bedtime  memantine 5 milliGRAM(s) Oral daily  metoprolol tartrate 25 milliGRAM(s) Oral every 12 hours  pantoprazole    Tablet 40 milliGRAM(s) Oral before breakfast  senna 2 Tablet(s) Oral at bedtime    CXR/ panc CT reviewed

## 2022-11-25 NOTE — PROGRESS NOTE ADULT - SUBJECTIVE AND OBJECTIVE BOX
SUBJECTIVE:    Patient is a 89y old Male who presents with a chief complaint of rib fractures (24 Nov 2022 06:02)    Currently admitted to medicine with the primary diagnosis of Multiple fractures of ribs       Today is hospital day 3d. This morning he is resting comfortably in bed and reports no new issues or overnight events.     PAST MEDICAL & SURGICAL HISTORY  Heart disease    Hypertension    Skin cancer    Presence of automatic cardioverter/defibrillator (AICD)    History of open heart surgery    FHx: skin cancer      SOCIAL HISTORY:  Negative for smoking/alcohol/drug use.     ALLERGIES:  No Known Allergies    MEDICATIONS:  STANDING MEDICATIONS  acetaminophen     Tablet .. 650 milliGRAM(s) Oral every 6 hours  aMIOdarone    Tablet 100 milliGRAM(s) Oral daily  cephalexin 500 milliGRAM(s) Oral every 12 hours  chlorhexidine 2% Cloths 1 Application(s) Topical every 12 hours  cyanocobalamin 1000 MICROGram(s) Oral daily  divalproex  milliGRAM(s) Oral every 12 hours  divalproex  milliGRAM(s) Oral at bedtime  gabapentin 100 milliGRAM(s) Oral three times a day  heparin   Injectable 5000 Unit(s) SubCutaneous every 8 hours  levothyroxine 25 MICROGram(s) Oral daily  lidocaine   4% Patch 1 Patch Transdermal daily  melatonin 5 milliGRAM(s) Oral at bedtime  memantine 5 milliGRAM(s) Oral daily  metoprolol tartrate 25 milliGRAM(s) Oral every 12 hours  ofloxacin 0.3% Solution 1 Drop(s) Both Ears three times a day  pantoprazole    Tablet 40 milliGRAM(s) Oral before breakfast  senna 2 Tablet(s) Oral at bedtime    PRN MEDICATIONS    VITALS:   T(F): 98.2  HR: 69  BP: 112/57  RR: 20  SpO2: 100%    LABS:                        8.0    5.76  )-----------( 158      ( 25 Nov 2022 01:51 )             24.6     11-25    146  |  108  |  24<H>  ----------------------------<  76  4.4   |  33<H>  |  1.0    Ca    8.1<L>      25 Nov 2022 11:34  Phos  3.8     11-24  Mg     2.1     11-24    TPro  5.2<L>  /  Alb  2.2<L>  /  TBili  0.8  /  DBili  x   /  AST  12  /  ALT  6   /  AlkPhos  69  11-25              CARDIAC MARKERS ( 24 Nov 2022 12:21 )  x     / 0.05 ng/mL / x     / x     / x      CARDIAC MARKERS ( 24 Nov 2022 06:00 )  x     / 0.05 ng/mL / <7 U/L / x     / 2.0 ng/mL      RADIOLOGY:    PHYSICAL EXAM:  GEN: No acute distress  LUNGS: Clear to auscultation bilaterally   HEART: S1/S2 present. RRR.   ABD: Soft, non-tender, non-distended. Bowel sounds present  EXT: NC/NC/NE/2+PP/ECHAVARRIA/Skin Intact.   NEURO: AAOX3    Intravenous access:   NG tube:   Garcia Catheter:   Indwelling Urethral Catheter:     Connect To:  Straight Drainage/Gravity    Indication:  Urine Output Monitoring in Critically Ill (11-25-22 @ 08:38) (not performed) [Active]  Indwelling Urethral Catheter:     Connect To:  Straight Drainage/Gravity    Indication:  Urine Output Monitoring in Critically Ill (11-23-22 @ 17:35) (not performed) [Active]

## 2022-11-25 NOTE — PROGRESS NOTE ADULT - ASSESSMENT
89yM w/ PMHx of Squamous cell carcinoma s/p resection and local flap with Dr. Doe 11/14/22, CAD s/p CABG 25 years ago, Afib and sick sinus syndrome s/p biventricular pacemaker and ICD, HTN, CKD3, GERD, HFrEF presented s/p unwitnessed fall at nursing home.  HO Chronic kidney diseaset was admitted under SICU - today he is in the CEU .    S/P Fall , Multiple skeletal injuries 2/2 fall, rib fx, clavicular fx, L2-L4 compression deformities  Acute hypoxemic respiratory failure on 3 L NC improving/  b/l pleural effusions  COVID + ( was positive in June as well)   Macrocytic anemia   Hx of frequent falls  HFrEF EF 32%  was 35-49 in May 2022  -Hx of  Sick sinus syndrome s/p Biv ICD  hx of chronic  AFib not on AC bcz of falls   hx of Lymphoma , Squamous cell carcinoma L face s/p excision   Hx of dementia   Hx of  CKD 3  Elevated troponin - likely demands stable             89yM w/ PMHx of Squamous cell carcinoma s/p resection and local flap with Dr. Doe 11/14/22, CAD s/p CABG 25 years ago, Afib and sick sinus syndrome s/p biventricular pacemaker and ICD, HTN, CKD3, GERD, HFrEF presented s/p unwitnessed fall at nursing home.  HO Chronic kidney diseaset was admitted under SICU - today he is in the CEU .    S/P Fall , Multiple skeletal injuries 2/2 fall, rib fx, clavicular fx,  age indeterminate compression fracture of L2-L4 vertebral bodies  Acute hypoxemic respiratory failure on 3 L NC improving/  b/l pleural effusions  COVID + ( was positive in June as well)   s/p SCC resection from left cheek with local flap coverage for reconstruction  Macrocytic anemia   Hx of frequent falls  HFrEF EF 32%  was 35-49 in May 2022  -Hx of  Sick sinus syndrome s/p Biv ICD  hx of chronic  AFib not on AC bcz of falls   hx of Lymphoma   Hx of dementia   Hx of  CKD 3  Elevated troponin - likely demands stable       Ortho/trauma/ Neurosurgery and plastic surgery inputs appreciated   lumbar brace for support when OOB  incentive spirometer   pain management   PT/OT   device interrogation   arm sling for comfort although patient nttp and not complaining of pain with ROM  Return to clinic: Orthopaedic office with Dr. Jeter, please call [phone] to schedule an appointment  daily xeroform changes to face - to be performed by RN  - continue abx ( keflex)   ID input appreciated -Given timeline, beyond benefit of RDV  - Low threshold for dex 6mg PO , patient on NC today improved from HFNC   check inf markers   - A/C per primary team   check LE venous duplex   lasix 40 mg today   ofloxacin eye drops for left eye conjunctivitis   c/w amidodarone , metoprolol   c/w namenda   c/w levothyroxin   c/w tylenol , gabapentin   c/w depakote   bowel regemin     DVT / GI ppx     I discussed with the patient daughter in details at bedside

## 2022-11-25 NOTE — PROGRESS NOTE ADULT - ASSESSMENT
89yM w/ PMHx of Squamous cell carcinoma s/p resection and local flap with Dr. Doe 11/14/22, CAD s/p CABG 25 years ago, Afib and sick sinus syndrome s/p biventricular pacemaker and ICD, HTN, CKD3, GERD, HFrEF presented s/p unwitnessed fall at nursing home.  HO Chronic kidney diseaset was admitted under SICU - today he is in the CEU .    #S/P Fall , Multiple skeletal injuries 2/2 fall, rib fx, clavicular fx,  age indeterminate compression fracture of L2-L4 vertebral bodies  #Acute hypoxemic respiratory failure on 3 L NC improving/  b/l pleural effusions  #COVID + ( was positive in June as well)   #s/p SCC resection from left cheek with local flap coverage for reconstruction  #Macrocytic anemia   #Hx of frequent falls  #HFrEF EF 32%  was 35-49 in May 2022  -Hx of  Sick sinus syndrome s/p Biv ICD  #hx of chronic  AFib not on AC bcz of falls   #hx of Lymphoma   #Hx of dementia   #Hx of  CKD 3  #Elevated troponin - likely demands stable       - Ortho/trauma/ Neurosurgery and plastic surgery inputs appreciated   - lumbar brace for support when OOB  - incentive spirometer   - pain management   - PT/OT   - device interrogation   - arm sling for comfort although patient nttp and not complaining of pain with ROM  - Return to clinic: Orthopaedic office with Dr. Jeter, please call [phone] to schedule an appointment  - daily xeroform changes to face - to be performed by RN  - continue abx ( keflex)   - ID input appreciated -Given timeline, beyond benefit of RDV  - Low threshold for dex 6mg PO , patient on NC today improved from HFNC   check inf markers   - A/C per primary team   check LE venous duplex   lasix 40 mg today   ofloxacin eye drops for left eye conjunctivitis   c/w amidodarone , metoprolol   c/w namenda   c/w levothyroxin   c/w tylenol , gabapentin   c/w depakote   bowel regemin     DVT / GI ppx

## 2022-11-26 NOTE — PROGRESS NOTE ADULT - SUBJECTIVE AND OBJECTIVE BOX
SUBJECTIVE:    Patient is a 89y old Male who presents with a chief complaint of sp fall (26 Nov 2022 07:53)    Currently admitted to medicine with the primary diagnosis of Multiple fractures of ribs       Today is hospital day 4d. This morning he is resting comfortably in bed and reports no new issues or overnight events.     PAST MEDICAL & SURGICAL HISTORY  Heart disease    Hypertension    Skin cancer    Presence of automatic cardioverter/defibrillator (AICD)    History of open heart surgery    FHx: skin cancer      SOCIAL HISTORY:  Negative for smoking/alcohol/drug use.     ALLERGIES:  No Known Allergies    MEDICATIONS:  STANDING MEDICATIONS  acetaminophen     Tablet .. 650 milliGRAM(s) Oral every 6 hours  aMIOdarone    Tablet 100 milliGRAM(s) Oral daily  cephalexin 500 milliGRAM(s) Oral every 12 hours  cyanocobalamin 1000 MICROGram(s) Oral daily  divalproex  milliGRAM(s) Oral every 12 hours  divalproex  milliGRAM(s) Oral at bedtime  gabapentin 100 milliGRAM(s) Oral three times a day  heparin   Injectable 5000 Unit(s) SubCutaneous every 8 hours  levothyroxine 25 MICROGram(s) Oral daily  lidocaine   4% Patch 1 Patch Transdermal daily  melatonin 5 milliGRAM(s) Oral at bedtime  memantine 5 milliGRAM(s) Oral daily  metoprolol tartrate 25 milliGRAM(s) Oral every 12 hours  midodrine 10 milliGRAM(s) Oral every 8 hours  ofloxacin 0.3% Solution 1 Drop(s) Both Ears three times a day  pantoprazole    Tablet 40 milliGRAM(s) Oral before breakfast  senna 2 Tablet(s) Oral at bedtime    PRN MEDICATIONS    VITALS:   T(F): 97  HR: 69  BP: 117/56  RR: 20  SpO2: 100%    LABS:                        8.3    5.41  )-----------( 94       ( 26 Nov 2022 00:53 )             26.5     11-26    143  |  105  |  24<H>  ----------------------------<  79  4.6   |  33<H>  |  1.0    Ca    7.9<L>      26 Nov 2022 00:53  Phos  3.8     11-24  Mg     2.2     11-26    TPro  5.3<L>  /  Alb  2.1<L>  /  TBili  0.8  /  DBili  x   /  AST  12  /  ALT  <5  /  AlkPhos  79  11-26              CARDIAC MARKERS ( 24 Nov 2022 12:21 )  x     / 0.05 ng/mL / x     / x     / x          RADIOLOGY:    PHYSICAL EXAM:  GEN: No acute distress  LUNGS: Clear to auscultation bilaterally   HEART: S1/S2 present. RRR.   ABD: Soft, non-tender, non-distended. Bowel sounds present  EXT: NC/NC/NE/2+PP/ECHAVARRIA/Skin Intact.   NEURO: AAOX3    Intravenous access:   NG tube:   Garcia Catheter:   Indwelling Urethral Catheter:     Connect To:  Straight Drainage/Gravity    Indication:  Urine Output Monitoring in Critically Ill (11-25-22 @ 08:38) (not performed) [Active]  Indwelling Urethral Catheter:     Connect To:  Straight Drainage/Gravity    Indication:  Urine Output Monitoring in Critically Ill (11-23-22 @ 17:35) (not performed) [Active]

## 2022-11-26 NOTE — PROGRESS NOTE ADULT - ASSESSMENT
A/P: 89y male with dementia admitted s/p fall with rib fractures and right clavicle fracture. 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

## 2022-11-26 NOTE — PROGRESS NOTE ADULT - SUBJECTIVE AND OBJECTIVE BOX
T H I S   I S    N O  T   A    F I N A L I Z E D   N O OLEG BACON, IDRIS  89y, Male  Allergy: No Known Allergies    Hospital Day: 4d    Patient seen and examined earlier today.     PMH/PSH:  PAST MEDICAL & SURGICAL HISTORY:  Heart disease      Hypertension      Skin cancer      Presence of automatic cardioverter/defibrillator (AICD)      History of open heart surgery      FHx: skin cancer          LAST 24-Hr EVENTS:    VITALS:  T(F): 97 (11-26-22 @ 11:21), Max: 97.1 (11-25-22 @ 20:00)  HR: 69 (11-26-22 @ 11:21)  BP: 117/56 (11-26-22 @ 11:21) (93/50 - 117/56)  RR: 20 (11-26-22 @ 11:21)  SpO2: 100% (11-26-22 @ 11:21)          TESTS & MEASUREMENTS:  Weight/BMI      11-24-22 @ 07:01  -  11-25-22 @ 07:00  --------------------------------------------------------  IN: 420 mL / OUT: 450 mL / NET: -30 mL    11-25-22 @ 07:01  -  11-26-22 @ 07:00  --------------------------------------------------------  IN: 0 mL / OUT: 700 mL / NET: -700 mL                            8.3    5.41  )-----------( 94       ( 26 Nov 2022 00:53 )             26.5       INR: 1.22 ratio (11-22-22 @ 10:40)    11-26    143  |  105  |  24<H>  ----------------------------<  79  4.6   |  33<H>  |  1.0    Ca    7.9<L>      26 Nov 2022 00:53  Phos  3.8     11-24  Mg     2.2     11-26    TPro  5.3<L>  /  Alb  2.1<L>  /  TBili  0.8  /  DBili  x   /  AST  12  /  ALT  <5  /  AlkPhos  79  11-26    LIVER FUNCTIONS - ( 26 Nov 2022 00:53 )  Alb: 2.1 g/dL / Pro: 5.3 g/dL / ALK PHOS: 79 U/L / ALT: <5 U/L / AST: 12 U/L / GGT: x           CARDIAC MARKERS ( 24 Nov 2022 12:21 )  x     / 0.05 ng/mL / x     / x     / x              Procalcitonin, Serum: 0.06 ng/mL (11-26-22 @ 00:53)    D-Dimer Assay, Quantitative: 609 ng/mL DDU (11-26-22 @ 00:53)    Ferritin, Serum: 437 ng/mL (11-26-22 @ 00:53)    Serum Pro-Brain Natriuretic Peptide: 90442 pg/mL (11-23-22 @ 11:54)    COVID-19 PCR: Detected (11-22-22 @ 10:40)        A1C with Estimated Average Glucose Result: 5.4 % (05-09-22 @ 07:10)      Indwelling Urethral Catheter:     Connect To:  Straight Drainage/Gravity    Indication:  Urine Output Monitoring in Critically Ill (11-25-22 @ 08:38) (not performed)  Indwelling Urethral Catheter:     Connect To:  Straight Drainage/Gravity    Indication:  Urine Output Monitoring in Critically Ill (11-23-22 @ 17:35) (not performed)      RADIOLOGY, ECG, & ADDITIONAL TESTS:  12 Lead ECG:   Ventricular Rate 75 BPM    Atrial Rate 94 BPM    QRS Duration 152 ms    Q-T Interval 492 ms    QTC Calculation(Bazett) 549 ms    R Axis 249 degrees    T Axis 50 degrees    Diagnosis Line Ventricular-paced rhythm    Abnormal ECG    Confirmed by ARTHUR NANCE MD ([id number]) on 11/23/2022 7:03:57 AM (11-23-22 @ 04:30)    CT Head No Cont:   ACC: [id number] EXAM:  CT BRAIN                          PROCEDURE DATE:  11/22/2022          INTERPRETATION:  Clinical History / Reason for exam: Trauma    Technique: Noncontrast head CT.  Contiguous unenhanced CT axial images of   the head from thebase to the vertex with coronal and sagittal reformats.    Comparison: CT head dated 6/25/2022.    Findings:    The ventricles and cortical sulci are within normal limits for age.    There are stable patchy and confluent hypodensities throughout the   hemispheric white matter without mass effect compatible with chronic   microvascular changes.    There is no acute intracranial hemorrhage, extra-axial fluid collection   or midline shift.  Gray white matter differentiation is maintained.    Vascular calcifications are noted.    The visualized paranasal sinuses and mastoids are clear.    IMPRESSION:    1.  No evidence of acute intracranial pathology. Stable exam since   6/25/2022.    2.  Stable moderate/severe chronic microvascular changes.    --- End of Report ---            RAFAEL CUBA MD; Attending Radiologist  This document has been electronically signed. Nov 22 2022 11:25AM (11-22-22 @ 11:09)    RECENT DIAGNOSTIC ORDERS:  Magnesium, Serum: 23:30 (11-26-22 @ 09:20)  Magnesium, Serum: 23:30 (11-26-22 @ 09:20)  Magnesium, Serum: 23:30 (11-26-22 @ 09:20)  Magnesium, Serum: 23:30 (11-26-22 @ 09:20)  Magnesium, Serum: 23:30 (11-26-22 @ 09:20)  Comprehensive Metabolic Panel: 23:30 (11-26-22 @ 09:20)  Comprehensive Metabolic Panel: 23:30 (11-26-22 @ 09:20)  Comprehensive Metabolic Panel: 23:30 (11-26-22 @ 09:20)  Comprehensive Metabolic Panel: 23:30 (11-26-22 @ 09:20)  Comprehensive Metabolic Panel: 23:30 (11-26-22 @ 09:20)  Complete Blood Count: 23:30 (11-26-22 @ 09:20)  Complete Blood Count: 23:30 (11-26-22 @ 09:20)  Complete Blood Count: 23:30 (11-26-22 @ 09:20)  Complete Blood Count: 23:30 (11-26-22 @ 09:20)  Complete Blood Count: 23:30 (11-26-22 @ 09:20)      MEDICATIONS:  MEDICATIONS  (STANDING):  acetaminophen     Tablet .. 650 milliGRAM(s) Oral every 6 hours  aMIOdarone    Tablet 100 milliGRAM(s) Oral daily  cephalexin 500 milliGRAM(s) Oral every 12 hours  cyanocobalamin 1000 MICROGram(s) Oral daily  divalproex  milliGRAM(s) Oral every 12 hours  divalproex  milliGRAM(s) Oral at bedtime  gabapentin 100 milliGRAM(s) Oral three times a day  heparin   Injectable 5000 Unit(s) SubCutaneous every 8 hours  levothyroxine 25 MICROGram(s) Oral daily  lidocaine   4% Patch 1 Patch Transdermal daily  melatonin 5 milliGRAM(s) Oral at bedtime  memantine 5 milliGRAM(s) Oral daily  metoprolol tartrate 25 milliGRAM(s) Oral every 12 hours  midodrine 10 milliGRAM(s) Oral every 8 hours  ofloxacin 0.3% Solution 1 Drop(s) Both Ears three times a day  pantoprazole    Tablet 40 milliGRAM(s) Oral before breakfast  senna 2 Tablet(s) Oral at bedtime    MEDICATIONS  (PRN):      HOME MEDICATIONS:  amiodarone 100 mg oral tablet (11-14)  Aspirin Low Dose 81 mg oral tablet, chewable (11-14)  benazepril 20 mg oral tablet (11-14)  cephalexin 500 mg oral tablet (11-14)  cyanocobalamin 100 mcg oral tablet (11-14)  levothyroxine 25 mcg (0.025 mg) oral tablet (11-14)  Melatonin 5 mg oral tablet (11-14)  memantine 5 mg oral tablet (11-14)  metoprolol tartrate 25 mg oral tablet (11-14)  omeprazole 20 mg oral delayed release capsule (11-14)  Vitamin B12 50 mcg oral tablet (11-14)      PHYSICAL EXAM:  GENERAL:   CHEST/LUNG:   HEART:   ABDOMEN:   EXTREMITIES:               IDRIS BACON  89y, Male  Allergy: No Known Allergies    Hospital Day: 4d    Patient seen and examined earlier today. the patient sleepy, he became agitated when I woke him up     PMH/PSH:  PAST MEDICAL & SURGICAL HISTORY:  Heart disease      Hypertension      Skin cancer      Presence of automatic cardioverter/defibrillator (AICD)      History of open heart surgery      FHx: skin cancer          LAST 24-Hr EVENTS:    VITALS:  T(F): 97 (11-26-22 @ 11:21), Max: 97.1 (11-25-22 @ 20:00)  HR: 69 (11-26-22 @ 11:21)  BP: 117/56 (11-26-22 @ 11:21) (93/50 - 117/56)  RR: 20 (11-26-22 @ 11:21)  SpO2: 100% (11-26-22 @ 11:21)          TESTS & MEASUREMENTS:  Weight/BMI      11-24-22 @ 07:01  -  11-25-22 @ 07:00  --------------------------------------------------------  IN: 420 mL / OUT: 450 mL / NET: -30 mL    11-25-22 @ 07:01  -  11-26-22 @ 07:00  --------------------------------------------------------  IN: 0 mL / OUT: 700 mL / NET: -700 mL                            8.3    5.41  )-----------( 94       ( 26 Nov 2022 00:53 )             26.5       INR: 1.22 ratio (11-22-22 @ 10:40)    11-26    143  |  105  |  24<H>  ----------------------------<  79  4.6   |  33<H>  |  1.0    Ca    7.9<L>      26 Nov 2022 00:53  Phos  3.8     11-24  Mg     2.2     11-26    TPro  5.3<L>  /  Alb  2.1<L>  /  TBili  0.8  /  DBili  x   /  AST  12  /  ALT  <5  /  AlkPhos  79  11-26    LIVER FUNCTIONS - ( 26 Nov 2022 00:53 )  Alb: 2.1 g/dL / Pro: 5.3 g/dL / ALK PHOS: 79 U/L / ALT: <5 U/L / AST: 12 U/L / GGT: x           CARDIAC MARKERS ( 24 Nov 2022 12:21 )  x     / 0.05 ng/mL / x     / x     / x              Procalcitonin, Serum: 0.06 ng/mL (11-26-22 @ 00:53)    D-Dimer Assay, Quantitative: 609 ng/mL DDU (11-26-22 @ 00:53)    Ferritin, Serum: 437 ng/mL (11-26-22 @ 00:53)    Serum Pro-Brain Natriuretic Peptide: 23375 pg/mL (11-23-22 @ 11:54)    COVID-19 PCR: Detected (11-22-22 @ 10:40)        A1C with Estimated Average Glucose Result: 5.4 % (05-09-22 @ 07:10)      Indwelling Urethral Catheter:     Connect To:  Straight Drainage/Gravity    Indication:  Urine Output Monitoring in Critically Ill (11-25-22 @ 08:38) (not performed)  Indwelling Urethral Catheter:     Connect To:  Straight Drainage/Gravity    Indication:  Urine Output Monitoring in Critically Ill (11-23-22 @ 17:35) (not performed)      RADIOLOGY, ECG, & ADDITIONAL TESTS:  12 Lead ECG:   Ventricular Rate 75 BPM    Atrial Rate 94 BPM    QRS Duration 152 ms    Q-T Interval 492 ms    QTC Calculation(Bazett) 549 ms    R Axis 249 degrees    T Axis 50 degrees    Diagnosis Line Ventricular-paced rhythm    Abnormal ECG    Confirmed by ARTHUR NANCE MD ([id number]) on 11/23/2022 7:03:57 AM (11-23-22 @ 04:30)    CT Head No Cont:   ACC: [id number] EXAM:  CT BRAIN                          PROCEDURE DATE:  11/22/2022          INTERPRETATION:  Clinical History / Reason for exam: Trauma    Technique: Noncontrast head CT.  Contiguous unenhanced CT axial images of   the head from thebase to the vertex with coronal and sagittal reformats.    Comparison: CT head dated 6/25/2022.    Findings:    The ventricles and cortical sulci are within normal limits for age.    There are stable patchy and confluent hypodensities throughout the   hemispheric white matter without mass effect compatible with chronic   microvascular changes.    There is no acute intracranial hemorrhage, extra-axial fluid collection   or midline shift.  Gray white matter differentiation is maintained.    Vascular calcifications are noted.    The visualized paranasal sinuses and mastoids are clear.    IMPRESSION:    1.  No evidence of acute intracranial pathology. Stable exam since   6/25/2022.    2.  Stable moderate/severe chronic microvascular changes.    --- End of Report ---            RAFAEL CUBA MD; Attending Radiologist  This document has been electronically signed. Nov 22 2022 11:25AM (11-22-22 @ 11:09)    RECENT DIAGNOSTIC ORDERS:  Magnesium, Serum: 23:30 (11-26-22 @ 09:20)  Magnesium, Serum: 23:30 (11-26-22 @ 09:20)  Magnesium, Serum: 23:30 (11-26-22 @ 09:20)  Magnesium, Serum: 23:30 (11-26-22 @ 09:20)  Magnesium, Serum: 23:30 (11-26-22 @ 09:20)  Comprehensive Metabolic Panel: 23:30 (11-26-22 @ 09:20)  Comprehensive Metabolic Panel: 23:30 (11-26-22 @ 09:20)  Comprehensive Metabolic Panel: 23:30 (11-26-22 @ 09:20)  Comprehensive Metabolic Panel: 23:30 (11-26-22 @ 09:20)  Comprehensive Metabolic Panel: 23:30 (11-26-22 @ 09:20)  Complete Blood Count: 23:30 (11-26-22 @ 09:20)  Complete Blood Count: 23:30 (11-26-22 @ 09:20)  Complete Blood Count: 23:30 (11-26-22 @ 09:20)  Complete Blood Count: 23:30 (11-26-22 @ 09:20)  Complete Blood Count: 23:30 (11-26-22 @ 09:20)      MEDICATIONS:  MEDICATIONS  (STANDING):  acetaminophen     Tablet .. 650 milliGRAM(s) Oral every 6 hours  aMIOdarone    Tablet 100 milliGRAM(s) Oral daily  cephalexin 500 milliGRAM(s) Oral every 12 hours  cyanocobalamin 1000 MICROGram(s) Oral daily  divalproex  milliGRAM(s) Oral every 12 hours  divalproex  milliGRAM(s) Oral at bedtime  gabapentin 100 milliGRAM(s) Oral three times a day  heparin   Injectable 5000 Unit(s) SubCutaneous every 8 hours  levothyroxine 25 MICROGram(s) Oral daily  lidocaine   4% Patch 1 Patch Transdermal daily  melatonin 5 milliGRAM(s) Oral at bedtime  memantine 5 milliGRAM(s) Oral daily  metoprolol tartrate 25 milliGRAM(s) Oral every 12 hours  midodrine 10 milliGRAM(s) Oral every 8 hours  ofloxacin 0.3% Solution 1 Drop(s) Both Ears three times a day  pantoprazole    Tablet 40 milliGRAM(s) Oral before breakfast  senna 2 Tablet(s) Oral at bedtime    MEDICATIONS  (PRN):      HOME MEDICATIONS:  amiodarone 100 mg oral tablet (11-14)  Aspirin Low Dose 81 mg oral tablet, chewable (11-14)  benazepril 20 mg oral tablet (11-14)  cephalexin 500 mg oral tablet (11-14)  cyanocobalamin 100 mcg oral tablet (11-14)  levothyroxine 25 mcg (0.025 mg) oral tablet (11-14)  Melatonin 5 mg oral tablet (11-14)  memantine 5 mg oral tablet (11-14)  metoprolol tartrate 25 mg oral tablet (11-14)  omeprazole 20 mg oral delayed release capsule (11-14)  Vitamin B12 50 mcg oral tablet (11-14)      PHYSICAL EXAM:  GENERAL: sleepy but easy araousable , follow simple command, frail, left side of face skin lesions with dressing , left eye discharge   CHEST/LUNG:  decrease breath sound b/l   HEART:  regular rhythm   ABDOMEN:  soft, non tender   EXTREMITIES:  no edema

## 2022-11-26 NOTE — PROGRESS NOTE ADULT - SUBJECTIVE AND OBJECTIVE BOX
Plastic Surgery Progress Note    Subjective:     Patient seen and examined at bedside. Patient found to have multiple rib fractures and right clavicle fracture in setting of unwitnessed fall at home. Incidentally found to be asymptomatic COVID+ on PCR, however also positive in June 2022.   Patient in CEU currently without acute overnight events.     OBJECTIVE:       T(C): 36.1 (11-26-22 @ 07:52), Max: 36.2 (11-25-22 @ 20:00)  T(F): 97 (11-26-22 @ 07:52), Max: 97.1 (11-25-22 @ 20:00)  HR: 69 (11-26-22 @ 07:52) (69 - 69)  BP: 93/51 (11-26-22 @ 07:52) (93/50 - 109/57)  RR: 20 (11-26-22 @ 07:52) (18 - 20)  SpO2: 100% (11-26-22 @ 07:52) (98% - 100%)      25 Nov 2022 07:01  -  26 Nov 2022 07:00  --------------------------------------------------------  IN:  Total IN: 0 mL    OUT:    Indwelling Catheter - Urethral (mL): 700 mL  Total OUT: 700 mL    Total NET: -700 mL          PHYSICAL EXAM:    GENERAL: NAD, lying in bed comfortably  HEAD:  Atraumatic, Normocephalic  FACE: left cheek reconstruction with stable area of distal tip breakdown. No expressible drainage or purulence, fibrinous exudate. Xeroform changed to face.                  8.3    5.41   )----------(  94        ( 26 Nov 2022 00:53 )               26.5      143    |  105    |  24     ----------------------------<  79         ( 26 Nov 2022 00:53 )  4.6     |  33     |  1.0      Ca    7.9        ( 26 Nov 2022 00:53 )  Mg     2.2       ( 26 Nov 2022 00:53 )    TPro  5.3    /  Alb  2.1    /  TBili  0.8    /  DBili  x      /  AST  12     /  ALT  <5     /  AlkPhos  79     ( 26 Nov 2022 00:53 )        CAPILLARY BLOOD GLUCOSE

## 2022-11-26 NOTE — PROGRESS NOTE ADULT - SUBJECTIVE AND OBJECTIVE BOX
Over Night Events: events noted, on NC, EPS noted    PHYSICAL EXAM    ICU Vital Signs Last 24 Hrs  T(C): 36.1 (26 Nov 2022 07:52), Max: 36.2 (25 Nov 2022 20:00)  T(F): 97 (26 Nov 2022 07:52), Max: 97.1 (25 Nov 2022 20:00)  HR: 69 (26 Nov 2022 07:52) (69 - 69)  BP: 93/51 (26 Nov 2022 07:52) (93/50 - 109/57)  BP(mean): 79 (26 Nov 2022 04:00) (66 - 79)  RR: 20 (26 Nov 2022 07:52) (16 - 20)  SpO2: 100% (26 Nov 2022 07:52) (98% - 100%)    O2 Parameters below as of 26 Nov 2022 04:00  Patient On (Oxygen Delivery Method): nasal cannula  O2 Flow (L/min): 2          General: chronically ill looking  Lungs: dec bs both bases  Cardiovascular: BENJAMIN 2.6  Abdomen: Soft, Positive BS  Extremities: No clubbing   Skin: Warm  Neurological: Non focal       11-25-22 @ 07:01  -  11-26-22 @ 07:00  --------------------------------------------------------  IN:  Total IN: 0 mL    OUT:    Indwelling Catheter - Urethral (mL): 700 mL  Total OUT: 700 mL    Total NET: -700 mL          LABS:                          8.3    5.41  )-----------( 94       ( 26 Nov 2022 00:53 )             26.5                                               11-26    143  |  105  |  24<H>  ----------------------------<  79  4.6   |  33<H>  |  1.0    Ca    7.9<L>      26 Nov 2022 00:53  Phos  3.8     11-24  Mg     2.2     11-26    TPro  5.3<L>  /  Alb  2.1<L>  /  TBili  0.8  /  DBili  x   /  AST  12  /  ALT  <5  /  AlkPhos  79  11-26                                                 CARDIAC MARKERS ( 24 Nov 2022 12:21 )  x     / 0.05 ng/mL / x     / x     / x                                                LIVER FUNCTIONS - ( 26 Nov 2022 00:53 )  Alb: 2.1 g/dL / Pro: 5.3 g/dL / ALK PHOS: 79 U/L / ALT: <5 U/L / AST: 12 U/L / GGT: x                                                                                                                                       MEDICATIONS  (STANDING):  acetaminophen     Tablet .. 650 milliGRAM(s) Oral every 6 hours  aMIOdarone    Tablet 100 milliGRAM(s) Oral daily  cephalexin 500 milliGRAM(s) Oral every 12 hours  cyanocobalamin 1000 MICROGram(s) Oral daily  divalproex  milliGRAM(s) Oral every 12 hours  divalproex  milliGRAM(s) Oral at bedtime  gabapentin 100 milliGRAM(s) Oral three times a day  heparin   Injectable 5000 Unit(s) SubCutaneous every 8 hours  levothyroxine 25 MICROGram(s) Oral daily  lidocaine   4% Patch 1 Patch Transdermal daily  melatonin 5 milliGRAM(s) Oral at bedtime  memantine 5 milliGRAM(s) Oral daily  metoprolol tartrate 25 milliGRAM(s) Oral every 12 hours  ofloxacin 0.3% Solution 1 Drop(s) Both Ears three times a day  pantoprazole    Tablet 40 milliGRAM(s) Oral before breakfast  senna 2 Tablet(s) Oral at bedtime

## 2022-11-26 NOTE — PROGRESS NOTE ADULT - ASSESSMENT
89yM w/ PMHx of Squamous cell carcinoma s/p resection and local flap with Dr. Doe 11/14/22, CAD s/p CABG 25 years ago, Afib and sick sinus syndrome s/p biventricular pacemaker and ICD, HTN, CKD3, GERD, HFrEF presented s/p unwitnessed fall at nursing home.  HO Chronic kidney diseaset was admitted under SICU - today he is in the CEU .    #S/P Fall , Multiple skeletal injuries 2/2 fall, rib fx, clavicular fx,  age indeterminate compression fracture of L2-L4 vertebral bodies  #Acute hypoxemic respiratory failure on 3 L NC improving/  b/l pleural effusions  #COVID + ( was positive in June as well)   #s/p SCC resection from left cheek with local flap coverage for reconstruction  #Macrocytic anemia   #Hx of frequent falls  #HFrEF EF 32%  was 35-49 in May 2022  -Hx of  Sick sinus syndrome s/p Biv ICD  #hx of chronic  AFib not on AC bcz of falls   #hx of Lymphoma   #Hx of dementia   #Hx of  CKD 3  #Elevated troponin - likely demands stable       - Ortho/trauma/ Neurosurgery and plastic surgery inputs appreciated   - lumbar brace for support when OOB  - incentive spirometer   - pain management   - PT/OT   - device interrogation   - arm sling for comfort although patient nttp and not complaining of pain with ROM  - Return to clinic: Orthopaedic office with Dr. Jeter, please call [phone] to schedule an appointment  - daily xeroform changes to face - to be performed by RN  - continue abx ( keflex)   - ID input appreciated -Given timeline, beyond benefit of RDV  - Low threshold for dex 6mg PO , patient on NC today improved from HFNC   check inf markers   - A/C per primary team   check LE venous duplex   lasix 40 mg today   ofloxacin eye drops for left eye conjunctivitis   c/w amidodarone , metoprolol   c/w namenda   c/w levothyroxin   c/w tylenol , gabapentin   c/w depakote   bowel regemin     DVT / GI ppx

## 2022-11-26 NOTE — PROGRESS NOTE ADULT - SUBJECTIVE AND OBJECTIVE BOX
IDRIS BACON  89y, Male  Allergy: No Known Allergies      LOS  3d    CHIEF COMPLAINT: rib fractures (24 Nov 2022 06:02)      INTERVAL EVENTS/HPI  - No acute events overnight  - T(F): , Max: 98.4 (11-24-22 @ 20:14)  - ID reconsulted for Hypoxia -- previously on HFNC -- now on 3L NC  - WBC Count: 5.76 (11-25-22 @ 01:51)  WBC Count: 5.22 (11-24-22 @ 06:00)     - Creatinine, Serum: 1.0 (11-25-22 @ 11:34)  Creatinine, Serum: 1.1 (11-24-22 @ 20:41)       ROS  General: Denies rigors, nightsweats  HEENT: Denies headache, rhinorrhea, sore throat, eye pain  CV: Denies CP, palpitations  PULM: Denies wheezing, hemoptysis  GI: Denies hematemesis, hematochezia, melena  : Denies discharge, hematuria  MSK: Denies arthralgias, myalgias  SKIN: Denies rash, lesions  NEURO: Denies paresthesias, weakness  PSYCH: Denies depression, anxiety    VITALS:  T(F): 93, Max: 98.4 (11-24-22 @ 20:14)  HR: 69  BP: 93/50  RR: 18Vital Signs Last 24 Hrs  T(C): 33.9 (25 Nov 2022 16:00), Max: 36.9 (24 Nov 2022 20:14)  T(F): 93 (25 Nov 2022 16:00), Max: 98.4 (24 Nov 2022 20:14)  HR: 69 (25 Nov 2022 16:00) (69 - 69)  BP: 93/50 (25 Nov 2022 16:00) (93/50 - 112/57)  BP(mean): 66 (25 Nov 2022 16:00) (66 - 66)  RR: 18 (25 Nov 2022 16:00) (16 - 20)  SpO2: 98% (25 Nov 2022 16:00) (98% - 100%)    Parameters below as of 25 Nov 2022 16:00  Patient On (Oxygen Delivery Method): nasal cannula        PHYSICAL EXAM:  Gen: NAD, resting in bed  HEENT: Normocephalic, atraumatic  Neck: supple, no lymphadenopathy  CV: Regular rate & regular rhythm  Lungs: decreased BS at bases, no fremitus  Abdomen: Soft, BS present  Ext: Warm, well perfused  Neuro: non focal, awake  Skin: no rash, no erythema  Lines: no phlebitis    FH: Non-contributory  Social Hx: Non-contributory    TESTS & MEASUREMENTS:                        8.0    5.76  )-----------( 158      ( 25 Nov 2022 01:51 )             24.6    11-25    146  |  108  |  24<H>  ----------------------------<  76  4.4   |  33<H>  |  1.0    Ca    8.1<L>      25 Nov 2022 11:34  Phos  3.8     11-24  Mg     2.1     11-24    TPro  5.2<L>  /  Alb  2.2<L>  /  TBili  0.8  /  DBili  x   /  AST  12  /  ALT  6   /  AlkPhos  69  11-25      LIVER FUNCTIONS - ( 25 Nov 2022 11:34 )  Alb: 2.2 g/dL / Pro: 5.2 g/dL / ALK PHOS: 69 U/L / ALT: 6 U/L / AST: 12 U/L / GGT: x                Blood Gas Venous - Lactate: 1.70 mmol/L (11-22-22 @ 13:17)  Blood Gas Venous - Lactate: 1.80 mmol/L (11-22-22 @ 12:43)  Lactate, Blood: 2.4 mmol/L (11-22-22 @ 10:40)      INFECTIOUS DISEASES TESTING  COVID-19 PCR: Detected (11-22-22 @ 10:40)  COVID-19 PCR: Detected (06-25-22 @ 17:10)  COVID-19 PCR: NotDetec (05-15-22 @ 15:17)  COVID-19 PCR: NotDetec (05-13-22 @ 18:10)  COVID-19 PCR: NotDetec (05-10-22 @ 19:00)  COVID-19 PCR: NotDetec (05-08-22 @ 15:35)      INFLAMMATORY MARKERS      RADIOLOGY & ADDITIONAL TESTS:  I have personally reviewed the last available Chest xray  CXR      CT      CARDIOLOGY TESTING  12 Lead ECG:  Ventricular Rate 75 BPM    Atrial Rate 94 BPM    QRS Duration 152 ms    Q-T Interval 492 ms    QTC Calculation(Bazett) 549 ms    R Axis 249 degrees    T Axis 50 degrees    Diagnosis Line Ventricular-paced rhythm    Abnormal ECG    Confirmed by ARTHUR NANCE MD ([id number]) on 11/23/2022 7:03:57 AM (11-23-22 @ 04:30)  12 Lead ECG:  Ventricular Rate 71 BPM    Atrial Rate 59 BPM    QRS Duration 156 ms    Q-T Interval 506 ms    QTC Calculation(Bazett) 549 ms    R Axis -79 degrees    T Axis 88 degrees    Diagnosis Line Ventricular-paced rhythm        Confirmed by ARTHUR NANCE MD ([id number]) on 11/23/2022 7:05:12 AM (11-22-22 @ 19:50)      MEDICATIONS  acetaminophen     Tablet .. 650 Oral every 6 hours  aMIOdarone    Tablet 100 Oral daily  cephalexin 500 Oral every 12 hours  chlorhexidine 2% Cloths 1 Topical every 12 hours  cyanocobalamin 1000 Oral daily  divalproex  Oral every 12 hours  divalproex  Oral at bedtime  gabapentin 100 Oral three times a day  heparin   Injectable 5000 SubCutaneous every 8 hours  levothyroxine 25 Oral daily  lidocaine   4% Patch 1 Transdermal daily  melatonin 5 Oral at bedtime  memantine 5 Oral daily  metoprolol tartrate 25 Oral every 12 hours  ofloxacin 0.3% Solution 1 Both Ears three times a day  pantoprazole    Tablet 40 Oral before breakfast  senna 2 Oral at bedtime      WEIGHT  Weight (kg): 72.6 (11-14-22 @ 07:30)  Creatinine, Serum: 1.0 mg/dL (11-25-22 @ 11:34)  Creatinine, Serum: 1.1 mg/dL (11-24-22 @ 20:41)      ANTIBIOTICS:  cephalexin 500 milliGRAM(s) Oral every 12 hours      All available historical records have been reviewed       IDRIS BACON  89y, Male  Allergy: No Known Allergies      LOS  3d    CHIEF COMPLAINT: rib fractures (24 Nov 2022 06:02)      INTERVAL EVENTS/HPI  - No acute events overnight  - T(F): , Max: 98.4 (11-24-22 @ 20:14)  - ID reconsulted for Hypoxia -- previously on HFNC -- now on 3L NC  - WBC Count: 5.76 (11-25-22 @ 01:51)  WBC Count: 5.22 (11-24-22 @ 06:00)     - Creatinine, Serum: 1.0 (11-25-22 @ 11:34)  Creatinine, Serum: 1.1 (11-24-22 @ 20:41)       ROS  unable to obtain history secondary to patient's mental status and/or sedation      VITALS:  T(F): 93, Max: 98.4 (11-24-22 @ 20:14)  HR: 69  BP: 93/50  RR: 18Vital Signs Last 24 Hrs  T(C): 33.9 (25 Nov 2022 16:00), Max: 36.9 (24 Nov 2022 20:14)  T(F): 93 (25 Nov 2022 16:00), Max: 98.4 (24 Nov 2022 20:14)  HR: 69 (25 Nov 2022 16:00) (69 - 69)  BP: 93/50 (25 Nov 2022 16:00) (93/50 - 112/57)  BP(mean): 66 (25 Nov 2022 16:00) (66 - 66)  RR: 18 (25 Nov 2022 16:00) (16 - 20)  SpO2: 98% (25 Nov 2022 16:00) (98% - 100%)    Parameters below as of 25 Nov 2022 16:00  Patient On (Oxygen Delivery Method): nasal cannula        PHYSICAL EXAM:  Gen: NAD, cachectic   HEENT: Normocephalic, atraumatic  Neck: supple, no lymphadenopathy  CV: Regular rate & regular rhythm  Lungs: decreased BS at bases, no fremitus  Abdomen: Soft, BS present  Ext: Warm, well perfused  Neuro: non focal  Skin: no rash, no erythema  Lines: no phlebitis  Leiva in place     FH: Non-contributory  Social Hx: Non-contributory    TESTS & MEASUREMENTS:                        8.0    5.76  )-----------( 158      ( 25 Nov 2022 01:51 )             24.6    11-25    146  |  108  |  24<H>  ----------------------------<  76  4.4   |  33<H>  |  1.0    Ca    8.1<L>      25 Nov 2022 11:34  Phos  3.8     11-24  Mg     2.1     11-24    TPro  5.2<L>  /  Alb  2.2<L>  /  TBili  0.8  /  DBili  x   /  AST  12  /  ALT  6   /  AlkPhos  69  11-25      LIVER FUNCTIONS - ( 25 Nov 2022 11:34 )  Alb: 2.2 g/dL / Pro: 5.2 g/dL / ALK PHOS: 69 U/L / ALT: 6 U/L / AST: 12 U/L / GGT: x                Blood Gas Venous - Lactate: 1.70 mmol/L (11-22-22 @ 13:17)  Blood Gas Venous - Lactate: 1.80 mmol/L (11-22-22 @ 12:43)  Lactate, Blood: 2.4 mmol/L (11-22-22 @ 10:40)      INFECTIOUS DISEASES TESTING  COVID-19 PCR: Detected (11-22-22 @ 10:40)  COVID-19 PCR: Detected (06-25-22 @ 17:10)  COVID-19 PCR: NotDetec (05-15-22 @ 15:17)  COVID-19 PCR: NotDetec (05-13-22 @ 18:10)  COVID-19 PCR: NotDetec (05-10-22 @ 19:00)  COVID-19 PCR: NotDetec (05-08-22 @ 15:35)      INFLAMMATORY MARKERS      RADIOLOGY & ADDITIONAL TESTS:  I have personally reviewed the last available Chest xray  CXR      CT      CARDIOLOGY TESTING  12 Lead ECG:  Ventricular Rate 75 BPM    Atrial Rate 94 BPM    QRS Duration 152 ms    Q-T Interval 492 ms    QTC Calculation(Bazett) 549 ms    R Axis 249 degrees    T Axis 50 degrees    Diagnosis Line Ventricular-paced rhythm    Abnormal ECG    Confirmed by ARTHUR NANCE MD ([id number]) on 11/23/2022 7:03:57 AM (11-23-22 @ 04:30)  12 Lead ECG:  Ventricular Rate 71 BPM    Atrial Rate 59 BPM    QRS Duration 156 ms    Q-T Interval 506 ms    QTC Calculation(Bazett) 549 ms    R Axis -79 degrees    T Axis 88 degrees    Diagnosis Line Ventricular-paced rhythm        Confirmed by ARTHUR NANCE MD ([id number]) on 11/23/2022 7:05:12 AM (11-22-22 @ 19:50)      MEDICATIONS  acetaminophen     Tablet .. 650 Oral every 6 hours  aMIOdarone    Tablet 100 Oral daily  cephalexin 500 Oral every 12 hours  chlorhexidine 2% Cloths 1 Topical every 12 hours  cyanocobalamin 1000 Oral daily  divalproex  Oral every 12 hours  divalproex  Oral at bedtime  gabapentin 100 Oral three times a day  heparin   Injectable 5000 SubCutaneous every 8 hours  levothyroxine 25 Oral daily  lidocaine   4% Patch 1 Transdermal daily  melatonin 5 Oral at bedtime  memantine 5 Oral daily  metoprolol tartrate 25 Oral every 12 hours  ofloxacin 0.3% Solution 1 Both Ears three times a day  pantoprazole    Tablet 40 Oral before breakfast  senna 2 Oral at bedtime      WEIGHT  Weight (kg): 72.6 (11-14-22 @ 07:30)  Creatinine, Serum: 1.0 mg/dL (11-25-22 @ 11:34)  Creatinine, Serum: 1.1 mg/dL (11-24-22 @ 20:41)      ANTIBIOTICS:  cephalexin 500 milliGRAM(s) Oral every 12 hours      All available historical records have been reviewed

## 2022-11-26 NOTE — PROGRESS NOTE ADULT - ASSESSMENT
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 Pain control. Lumbar brace.     HEENT: Oral care. Plastic surgery follow up. wound care     PULMONARY:  HOB @ 45 degrees.  Aspiration precautions. Wean O2, Incentive spirometry. keep SAo2 92 to 96%    CARDIOVASCULAR: keep MAP >60. Lasix 40 mg daile. Rate control  , midodrine 10 q 8    GI: GI prophylaxis.  Feeding.  Bowel regimen     RENAL:  Follow up lytes.  Correct as needed    INFECTIOUS DISEASE: abx per  ID, Fup inlf markers    HEMATOLOGICAL:  DVT prophylaxis. monitor CBC , FUP LE doppler    ENDOCRINE:  Follow up FS.  Insulin protocol if needed.    MUSCULOSKELETAL: OOBC  / PT / OT    SDU    goc

## 2022-11-26 NOTE — PROGRESS NOTE ADULT - ASSESSMENT
89yM w/ PMHx of Squamous cell carcinoma s/p resection and local flap with Dr. Doe 11/14/22, CAD s/p CABG 25 years ago, Afib and sick sinus syndrome s/p biventricular pacemaker and ICD, HTN, CKD3, GERD, HFrEF presented s/p unwitnessed fall at nursing home.  HO Chronic kidney diseaset was admitted under SICU - today he is in the CEU .    S/P Fall , Multiple skeletal injuries 2/2 fall, rib fx, clavicular fx,  age indeterminate compression fracture of L2-L4 vertebral bodies  Acute hypoxemic respiratory failure on 3 L NC improving/  b/l pleural effusions  COVID + ( was positive in June as well)   s/p SCC resection from left cheek with local flap coverage for reconstruction  Macrocytic anemia   Hx of frequent falls  HFrEF EF 32%  was 35-49 in May 2022  -Hx of  Sick sinus syndrome s/p Biv ICD  hx of chronic  AFib not on AC bcz of falls   hx of Lymphoma   Hx of dementia   Hx of  CKD 3  Elevated troponin - likely demands stable       Ortho/trauma/ Neurosurgery and plastic surgery inputs appreciated   lumbar brace for support when OOB  incentive spirometer   pain management   PT/OT   device interrogation   arm sling for comfort although patient nttp and not complaining of pain with ROM  Return to clinic: Orthopaedic office with Dr. Jeter, please call [phone] to schedule an appointment  daily xeroform changes to face - to be performed by RN  - continue abx ( keflex)   ID input appreciated -Given timeline, beyond benefit of RDV  - Low threshold for dex 6mg PO , patient on NC today improved    Trend  inf markers - D dimer 609 ( age adjusted cut off 465) , ferritin 437 , CRP 56.9  - A/C per primary team    LE venous duplex negative for DVT   lasix 40 mg yesterday and today   ofloxacin eye drops for left eye conjunctivitis   c/w amiodarone , metoprolol   c/w Namenda   c/w levothyroxine - check TSH   c/w tylenol , gabapentin   c/w Depakote - Check level and will check ammonia level as well -  bowel regimen   thrombocytopenia noted - repeat CBC today and if platelet worsening stop heparin and and send HIT and DIC panel - can give fondaparinux for dvt ppx   EP input appreciated 6 months left on the battery - more frequent follow up needed The patient follows with Dr. River  The patient is intermittently device dependant, Total  92.6%, Excellent CRT pacing - 100%  Device evidence of congestive heart failure / fluid accumulation      DVT / GI ppx     I discussed with the patient daughter in details at bedside yesterday

## 2022-11-26 NOTE — PROGRESS NOTE ADULT - ASSESSMENT
ASSESSMENT  89yM w/ PMHx of Squamous cell carcinoma s/p resection and local flap with Dr. Doe 11/14/22, CAD s/p CABG 25 years ago, Afib and sick sinus syndrome s/p biventricular pacemaker and ICD, HTN, CKD3, GERD, HFrEF, lymphoma being followed by patient's oncologist seen as Trauma consult s/p unwitnessed fall at nursing home ID consulted for COVID      IMPRESSION  #Recent COVID19, continued PCR positivity - on HFNC, now on 3L  - CXR Bibasilar opacities/effusions, greater on the left.  - reports unvaccinated    Acute hypoxemic resp failure of multiple etiologies-    - Serum Pro-Brain Natriuretic Peptide: 77675 pg/mL (11.23.22 @ 11:54)    CT Moderate to large bilateral pleural effusions, left  greater than right, with bibasilar compressive atelectasis. Scattered  areas of interlobular septal thickening, predominantly at the apices,  with scattered bilateral groundglass opacities, suggestive of underlying CHF.  - TTE 35- 40%     #Trauma after fall with multiple injuries  Creatinine, Serum: 1.1 (11-23-22 @ 05:10)    Weight (kg): 72.6 (11-14-22 @ 07:30)    RECOMMENDATIONS  This is a preliminary incomplete pended note, all final recommendations to follow after interview and examination of the patient.      Please call or message on [employer] Teams if with any questions.  Spectra [phone]   ASSESSMENT  89yM w/ PMHx of Squamous cell carcinoma s/p resection and local flap with Dr. Doe 11/14/22, CAD s/p CABG 25 years ago, Afib and sick sinus syndrome s/p biventricular pacemaker and ICD, HTN, CKD3, GERD, HFrEF, lymphoma being followed by patient's oncologist seen as Trauma consult s/p unwitnessed fall at nursing home ID consulted for COVID      IMPRESSION  #Recent COVID19, continued PCR positivity - on HFNC, now on 3L  - CXR Bibasilar opacities/effusions, greater on the left.  - reports unvaccinated    Acute hypoxemic resp failure of multiple etiologies-    - Serum Pro-Brain Natriuretic Peptide: 98831 pg/mL (11.23.22 @ 11:54)    CT Moderate to large bilateral pleural effusions, left  greater than right, with bibasilar compressive atelectasis. Scattered  areas of interlobular septal thickening, predominantly at the apices,  with scattered bilateral groundglass opacities, suggestive of underlying CHF.  - TTE 35- 40%     #Trauma after fall with multiple injuries  Creatinine, Serum: 1.1 (11-23-22 @ 05:10)    Weight (kg): 72.6 (11-14-22 @ 07:30)    RECOMMENDATIONS  - supportive care for COVID   - previously on HFNC, now on 3L NC   - ok to hold on steroids for now   - on Keflex until 11/26  - monitor O2       Please call or message on [employer] Teams if with any questions.  Spectra [phone]

## 2022-11-26 NOTE — PHYSICAL THERAPY INITIAL EVALUATION ADULT - SPECIFY REASON(S)
PT attempted to see pt for PT Eval at b/s. As per Amy MUIR pt is lethargic, not following commands at this time. Will f/u with PT as appropriate.

## 2022-11-27 NOTE — CHART NOTE - NSCHARTNOTEFT_GEN_A_CORE
From: CEU  To; [facility]    89yM w/ PMHx of Squamous cell carcinoma s/p resection and local flap with Dr. Doe 11/14/22, CAD s/p CABG 25 years ago, Afib and sick sinus syndrome s/p biventricular pacemaker and ICD, HTN, CKD3, GERD, HFrEF, lymphoma being followed by patient's oncologist seen as Trauma consult s/p unwitnessed fall at nursing home .  Trauma assessment in ED: ABCs intact , GCS 14 , AAOx0. Patient brought in by EMS, daughter at bedside to give history. Patient has history of frequent falls and is not on anticoagulation for that reason. She states his  baseline mental status is AAOx1-2, but he seemed more confused to her today. CTH negative, CT-Cspine negative. CT chest/abdomen/pelvis revealing R acromion fracture seen on CT vs. distal clavicular fracture on XR with associated 4x3 cm hematoma, b/l moderate-large pleural effusions, acute nondisplaced fractures of b/l 11-12 posterior ribs, age-indeterminate right 5-6 lateral rib fractures and left 10-11 lateral rib fractures, age-indeterminate L2-L4 compression deformities.   Non-traumatic injuries identified: heterogenous b/l renal cortical soft tissue masses suspicious of neoplasm, thickening of left anteriolateral bladder wall, cholelithiasis, fat-containing left inguinal hernia.      #S/P Fall , Multiple skeletal injuries 2/2 fall, rib fx, clavicular fx,  age indeterminate compression fracture of L2-L4 vertebral bodies  #Acute hypoxemic respiratory failure on 3 L NC improving/  b/l pleural effusions  #COVID + ( was positive in June as well)   #s/p SCC resection from left cheek with local flap coverage for reconstruction  #Macrocytic anemia   #Hx of frequent falls  #HFrEF EF 32%  was 35-49 in May 2022  -Hx of  Sick sinus syndrome s/p Biv ICD  #hx of chronic  AFib not on AC bcz of falls   #hx of Lymphoma   #Hx of dementia   #Hx of  CKD 3  #Elevated troponin - likely demands stable       - Ortho/trauma/ Neurosurgery and plastic surgery inputs appreciated   - lumbar brace for support when OOB  - incentive spirometer   - pain management   - PT/OT   - device interrogation   - arm sling for comfort although patient nttp and not complaining of pain with ROM  - Return to clinic: Orthopaedic office with Dr. Jeter, please call [phone] to schedule an appointment  - daily xeroform changes to face - to be performed by RN  - continue abx ( keflex)   - ID input appreciated -Given timeline, beyond benefit of RDV  - Low threshold for dex 6mg PO , patient on NC today improved from HFNC   - check inf markers   - A/C per primary team   check LE venous duplex   lasix 40 mg daily  ofloxacin eye drops for left eye conjunctivitis   c/w amidodarone , metoprolol   c/w namenda   c/w levothyroxin   c/w tylenol , gabapentin   c/w depakote   bowel regemin     DVT / GI ppx

## 2022-11-27 NOTE — PHYSICAL THERAPY INITIAL EVALUATION ADULT - DIAGNOSIS, PT EVAL
Debility secondary multiple fractures of ribs; Fracture of acromion of scapula; Pleural effusion; Renal mass; S/P skin biopsy

## 2022-11-27 NOTE — PROGRESS NOTE ADULT - ASSESSMENT
89yM w/ PMHx of Squamous cell carcinoma s/p resection and local flap with Dr. Doe 11/14/22, CAD s/p CABG 25 years ago, Afib and sick sinus syndrome s/p biventricular pacemaker and ICD, HTN, CKD3, GERD, HFrEF presented s/p unwitnessed fall at nursing home.  HO Chronic kidney diseaset was admitted under SICU - today he is in the CEU .    S/P Fall , Multiple skeletal injuries 2/2 fall, rib fx, clavicular fx,  age indeterminate compression fracture of L2-L4 vertebral bodies  Acute hypoxemic respiratory failure on 3 L NC improving/  b/l pleural effusions  COVID + ( was positive in June as well)   s/p SCC resection from left cheek with local flap coverage for reconstruction  Macrocytic anemia   Hx of frequent falls  HFrEF EF 32%  was 35-49 in May 2022  -Hx of  Sick sinus syndrome s/p Biv ICD  hx of chronic  AFib not on AC bcz of falls   hx of Lymphoma   Hx of dementia   Hx of  CKD 3  Elevated troponin - likely demands stable       Ortho/trauma/ Neurosurgery and plastic surgery inputs appreciated   lumbar brace for support when OOB  incentive spirometer   pain management   PT/OT   device interrogation   arm sling for comfort although patient nttp and not complaining of pain with ROM  Return to clinic: Orthopaedic office with Dr. Jeter, please call [phone] to schedule an appointment  daily xeroform changes to face - to be performed by RN  - continue abx ( keflex)   ID input appreciated -Given timeline, beyond benefit of RDV  - Low threshold for dex 6mg PO , patient on NC today improved    Trend  inf markers - D dimer 609 ( age adjusted cut off 465) , ferritin 437 , CRP 56.9  - A/C per primary team    LE venous duplex negative for DVT   lasix 40 mg 11/25 and 11/26 - will hold lasix today for increase bicarb  and creatinine - evaluate tomorrow for the need of lasix   ofloxacin eye drops for left eye conjunctivitis   c/w amiodarone , metoprolol   c/w Namenda   c/w levothyroxine - check TSH   c/w tylenol , gabapentin   c/w Depakote -  level is  low , ammonia level normal   bowel regimen   thrombocytopenia noted - repeat CBC today and if platelet worsening stop heparin and and send HIT and DIC panel - can give fondaparinux for dvt ppx   EP input appreciated 6 months left on the battery - more frequent follow up needed The patient follows with Dr. River  The patient is intermittently device dependant, Total  92.6%, Excellent CRT pacing - 100%  Device evidence of congestive heart failure / fluid accumulation      GOC discussion with the patient daughter , she stated that the patient has a living well stating no resuscitation or intubation and this is her wishes too for him. MOST From filled .  I also discussed with her the cardiac device battery she stated that she will talk with his cardiology as he was recommending to turn it off given his advance dementia   will consult palliative team given decrease oral intake as well   patient is DNR/DNI     DVT / GI ppx     I discussed with the patient daughter in details at bedside in details    pending: resp status, evaluate  if lasix needed, Palliative team

## 2022-11-27 NOTE — PROGRESS NOTE ADULT - SUBJECTIVE AND OBJECTIVE BOX
T H I S   I S    N O  T   A    F I N A L I Z E D   N O T LEEANNA BACON, IDRIS  89y, Male  Allergy: No Known Allergies    Hospital Day: 5d    Patient seen and examined earlier today.     PMH/PSH:  PAST MEDICAL & SURGICAL HISTORY:  Heart disease      Hypertension      Skin cancer      Presence of automatic cardioverter/defibrillator (AICD)      History of open heart surgery      FHx: skin cancer          LAST 24-Hr EVENTS:    VITALS:  T(F): 96.9 (11-27-22 @ 11:09), Max: 97 (11-27-22 @ 07:30)  HR: 69 (11-27-22 @ 11:09)  BP: 94/55 (11-27-22 @ 11:09) (91/50 - 104/56)  RR: 20 (11-27-22 @ 11:09)  SpO2: 96% (11-27-22 @ 11:09)          TESTS & MEASUREMENTS:  Weight/BMI      11-25-22 @ 07:01  -  11-26-22 @ 07:00  --------------------------------------------------------  IN: 0 mL / OUT: 700 mL / NET: -700 mL    11-26-22 @ 07:01  -  11-27-22 @ 07:00  --------------------------------------------------------  IN: 0 mL / OUT: 1200 mL / NET: -1200 mL                            9.4    5.70  )-----------( 161      ( 27 Nov 2022 00:30 )             29.9         11-27    145  |  105  |  26<H>  ----------------------------<  69<L>  4.5   |  35<H>  |  1.1    Ca    8.5      27 Nov 2022 00:30  Mg     1.9     11-27    TPro  5.7<L>  /  Alb  2.2<L>  /  TBili  1.0  /  DBili  x   /  AST  9   /  ALT  5   /  AlkPhos  75  11-27    LIVER FUNCTIONS - ( 27 Nov 2022 00:30 )  Alb: 2.2 g/dL / Pro: 5.7 g/dL / ALK PHOS: 75 U/L / ALT: 5 U/L / AST: 9 U/L / GGT: x                   Procalcitonin, Serum: 0.06 ng/mL (11-26-22 @ 00:53)    D-Dimer Assay, Quantitative: 609 ng/mL DDU (11-26-22 @ 00:53)    Ferritin, Serum: 437 ng/mL (11-26-22 @ 00:53)    Serum Pro-Brain Natriuretic Peptide: 11609 pg/mL (11-23-22 @ 11:54)    COVID-19 PCR: Detected (11-22-22 @ 10:40)        A1C with Estimated Average Glucose Result: 5.4 % (05-09-22 @ 07:10)      Indwelling Urethral Catheter:     Connect To:  Straight Drainage/Gravity    Indication:  Urine Output Monitoring in Critically Ill (11-25-22 @ 08:38) (not performed)  Indwelling Urethral Catheter:     Connect To:  Straight Drainage/Gravity    Indication:  Urine Output Monitoring in Critically Ill (11-23-22 @ 17:35) (not performed)      RADIOLOGY, ECG, & ADDITIONAL TESTS:  12 Lead ECG:   Ventricular Rate 75 BPM    Atrial Rate 94 BPM    QRS Duration 152 ms    Q-T Interval 492 ms    QTC Calculation(Bazett) 549 ms    R Axis 249 degrees    T Axis 50 degrees    Diagnosis Line Ventricular-paced rhythm    Abnormal ECG    Confirmed by ARTHUR NANCE MD ([id number]) on 11/23/2022 7:03:57 AM (11-23-22 @ 04:30)    CT Head No Cont:   ACC: [id number] EXAM:  CT BRAIN                          PROCEDURE DATE:  11/22/2022          INTERPRETATION:  Clinical History / Reason for exam: Trauma    Technique: Noncontrast head CT.  Contiguous unenhanced CT axial images of   the head from thebase to the vertex with coronal and sagittal reformats.    Comparison: CT head dated 6/25/2022.    Findings:    The ventricles and cortical sulci are within normal limits for age.    There are stable patchy and confluent hypodensities throughout the   hemispheric white matter without mass effect compatible with chronic   microvascular changes.    There is no acute intracranial hemorrhage, extra-axial fluid collection   or midline shift.  Gray white matter differentiation is maintained.    Vascular calcifications are noted.    The visualized paranasal sinuses and mastoids are clear.    IMPRESSION:    1.  No evidence of acute intracranial pathology. Stable exam since   6/25/2022.    2.  Stable moderate/severe chronic microvascular changes.    --- End of Report ---            RAFAEL CUBA MD; Attending Radiologist  This document has been electronically signed. Nov 22 2022 11:25AM (11-22-22 @ 11:09)    RECENT DIAGNOSTIC ORDERS:  Thyroid Stimulating Hormone, Serum: 23:30 (11-26-22 @ 14:02)      MEDICATIONS:  MEDICATIONS  (STANDING):  acetaminophen     Tablet .. 650 milliGRAM(s) Oral every 6 hours  aMIOdarone    Tablet 100 milliGRAM(s) Oral daily  cephalexin 500 milliGRAM(s) Oral every 12 hours  cyanocobalamin 1000 MICROGram(s) Oral daily  divalproex  milliGRAM(s) Oral every 12 hours  divalproex  milliGRAM(s) Oral at bedtime  gabapentin 100 milliGRAM(s) Oral three times a day  heparin   Injectable 5000 Unit(s) SubCutaneous every 8 hours  levothyroxine 25 MICROGram(s) Oral daily  lidocaine   4% Patch 1 Patch Transdermal daily  melatonin 5 milliGRAM(s) Oral at bedtime  memantine 5 milliGRAM(s) Oral daily  metoprolol tartrate 25 milliGRAM(s) Oral every 12 hours  midodrine 10 milliGRAM(s) Oral every 8 hours  ofloxacin 0.3% Solution 1 Drop(s) Both Ears three times a day  pantoprazole    Tablet 40 milliGRAM(s) Oral before breakfast  senna 2 Tablet(s) Oral at bedtime    MEDICATIONS  (PRN):      HOME MEDICATIONS:  amiodarone 100 mg oral tablet (11-14)  Aspirin Low Dose 81 mg oral tablet, chewable (11-14)  benazepril 20 mg oral tablet (11-14)  cephalexin 500 mg oral tablet (11-14)  cyanocobalamin 100 mcg oral tablet (11-14)  levothyroxine 25 mcg (0.025 mg) oral tablet (11-14)  Melatonin 5 mg oral tablet (11-14)  memantine 5 mg oral tablet (11-14)  metoprolol tartrate 25 mg oral tablet (11-14)  omeprazole 20 mg oral delayed release capsule (11-14)  Vitamin B12 50 mcg oral tablet (11-14)      PHYSICAL EXAM:  GENERAL:   CHEST/LUNG:   HEART:   ABDOMEN:   EXTREMITIES:             ARLEEN, IDRIS  89y, Male  Allergy: No Known Allergies    Hospital Day: 5d    Patient seen and examined earlier today. the patient is more awake to day and was able to tell his name     PMH/PSH:  PAST MEDICAL & SURGICAL HISTORY:  Heart disease      Hypertension      Skin cancer      Presence of automatic cardioverter/defibrillator (AICD)      History of open heart surgery      FHx: skin cancer          LAST 24-Hr EVENTS:    VITALS:  T(F): 96.9 (11-27-22 @ 11:09), Max: 97 (11-27-22 @ 07:30)  HR: 69 (11-27-22 @ 11:09)  BP: 94/55 (11-27-22 @ 11:09) (91/50 - 104/56)  RR: 20 (11-27-22 @ 11:09)  SpO2: 96% (11-27-22 @ 11:09)          TESTS & MEASUREMENTS:  Weight/BMI      11-25-22 @ 07:01  -  11-26-22 @ 07:00  --------------------------------------------------------  IN: 0 mL / OUT: 700 mL / NET: -700 mL    11-26-22 @ 07:01  -  11-27-22 @ 07:00  --------------------------------------------------------  IN: 0 mL / OUT: 1200 mL / NET: -1200 mL                            9.4    5.70  )-----------( 161      ( 27 Nov 2022 00:30 )             29.9         11-27    145  |  105  |  26<H>  ----------------------------<  69<L>  4.5   |  35<H>  |  1.1    Ca    8.5      27 Nov 2022 00:30  Mg     1.9     11-27    TPro  5.7<L>  /  Alb  2.2<L>  /  TBili  1.0  /  DBili  x   /  AST  9   /  ALT  5   /  AlkPhos  75  11-27    LIVER FUNCTIONS - ( 27 Nov 2022 00:30 )  Alb: 2.2 g/dL / Pro: 5.7 g/dL / ALK PHOS: 75 U/L / ALT: 5 U/L / AST: 9 U/L / GGT: x                   Procalcitonin, Serum: 0.06 ng/mL (11-26-22 @ 00:53)    D-Dimer Assay, Quantitative: 609 ng/mL DDU (11-26-22 @ 00:53)    Ferritin, Serum: 437 ng/mL (11-26-22 @ 00:53)    Serum Pro-Brain Natriuretic Peptide: 94392 pg/mL (11-23-22 @ 11:54)    COVID-19 PCR: Detected (11-22-22 @ 10:40)        A1C with Estimated Average Glucose Result: 5.4 % (05-09-22 @ 07:10)      Indwelling Urethral Catheter:     Connect To:  Straight Drainage/Gravity    Indication:  Urine Output Monitoring in Critically Ill (11-25-22 @ 08:38) (not performed)  Indwelling Urethral Catheter:     Connect To:  Straight Drainage/Gravity    Indication:  Urine Output Monitoring in Critically Ill (11-23-22 @ 17:35) (not performed)      RADIOLOGY, ECG, & ADDITIONAL TESTS:  12 Lead ECG:   Ventricular Rate 75 BPM    Atrial Rate 94 BPM    QRS Duration 152 ms    Q-T Interval 492 ms    QTC Calculation(Bazett) 549 ms    R Axis 249 degrees    T Axis 50 degrees    Diagnosis Line Ventricular-paced rhythm    Abnormal ECG    Confirmed by ARTHUR NANCE MD ([id number]) on 11/23/2022 7:03:57 AM (11-23-22 @ 04:30)    CT Head No Cont:   ACC: [id number] EXAM:  CT BRAIN                          PROCEDURE DATE:  11/22/2022          INTERPRETATION:  Clinical History / Reason for exam: Trauma    Technique: Noncontrast head CT.  Contiguous unenhanced CT axial images of   the head from thebase to the vertex with coronal and sagittal reformats.    Comparison: CT head dated 6/25/2022.    Findings:    The ventricles and cortical sulci are within normal limits for age.    There are stable patchy and confluent hypodensities throughout the   hemispheric white matter without mass effect compatible with chronic   microvascular changes.    There is no acute intracranial hemorrhage, extra-axial fluid collection   or midline shift.  Gray white matter differentiation is maintained.    Vascular calcifications are noted.    The visualized paranasal sinuses and mastoids are clear.    IMPRESSION:    1.  No evidence of acute intracranial pathology. Stable exam since   6/25/2022.    2.  Stable moderate/severe chronic microvascular changes.    --- End of Report ---            RAFAEL CUBA MD; Attending Radiologist  This document has been electronically signed. Nov 22 2022 11:25AM (11-22-22 @ 11:09)    RECENT DIAGNOSTIC ORDERS:  Thyroid Stimulating Hormone, Serum: 23:30 (11-26-22 @ 14:02)      MEDICATIONS:  MEDICATIONS  (STANDING):  acetaminophen     Tablet .. 650 milliGRAM(s) Oral every 6 hours  aMIOdarone    Tablet 100 milliGRAM(s) Oral daily  cephalexin 500 milliGRAM(s) Oral every 12 hours  cyanocobalamin 1000 MICROGram(s) Oral daily  divalproex  milliGRAM(s) Oral every 12 hours  divalproex  milliGRAM(s) Oral at bedtime  gabapentin 100 milliGRAM(s) Oral three times a day  heparin   Injectable 5000 Unit(s) SubCutaneous every 8 hours  levothyroxine 25 MICROGram(s) Oral daily  lidocaine   4% Patch 1 Patch Transdermal daily  melatonin 5 milliGRAM(s) Oral at bedtime  memantine 5 milliGRAM(s) Oral daily  metoprolol tartrate 25 milliGRAM(s) Oral every 12 hours  midodrine 10 milliGRAM(s) Oral every 8 hours  ofloxacin 0.3% Solution 1 Drop(s) Both Ears three times a day  pantoprazole    Tablet 40 milliGRAM(s) Oral before breakfast  senna 2 Tablet(s) Oral at bedtime    MEDICATIONS  (PRN):      HOME MEDICATIONS:  amiodarone 100 mg oral tablet (11-14)  Aspirin Low Dose 81 mg oral tablet, chewable (11-14)  benazepril 20 mg oral tablet (11-14)  cephalexin 500 mg oral tablet (11-14)  cyanocobalamin 100 mcg oral tablet (11-14)  levothyroxine 25 mcg (0.025 mg) oral tablet (11-14)  Melatonin 5 mg oral tablet (11-14)  memantine 5 mg oral tablet (11-14)  metoprolol tartrate 25 mg oral tablet (11-14)  omeprazole 20 mg oral delayed release capsule (11-14)  Vitamin B12 50 mcg oral tablet (11-14)      PHYSICAL EXAM:  GENERAL: sleepy but easy araousable , follow simple command, frail, left side of face skin lesions with dressing , left eye discharge   CHEST/LUNG:  decrease breath sound b/l   HEART:  regular rhythm   ABDOMEN:  soft, non tender   EXTREMITIES:  no edema

## 2022-11-27 NOTE — PROGRESS NOTE ADULT - REASON FOR ADMISSION
respiratory monitoring in the setting of multiple rib fractures
sp fall
Wound Infection
rib fractures
sp fall

## 2022-11-27 NOTE — PHYSICAL THERAPY INITIAL EVALUATION ADULT - GENERAL OBSERVATIONS, REHAB EVAL
Pt encountered in semi-li position in bed, A & O x 1 in NAD, +tele, +tapia, SPO2 95% RA, no c/o pain, and follows simple command. Pt is dependent in bed mobility and poor sitting balance secondary gen weakness and low endurance. OOB act not performed secondary pt is dependent in mobility. Pt will benefit from skilled PT to increase strength, balance and functional mobility.

## 2022-11-27 NOTE — PHYSICAL THERAPY INITIAL EVALUATION ADULT - PERTINENT HX OF CURRENT PROBLEM, REHAB EVAL
89yM w/ PMHx of Squamous cell carcinoma s/p resection and local flap with Dr. Doe 11/14/22, CAD s/p CABG 25 years ago, Afib and sick sinus syndrome s/p biventricular pacemaker and ICD, HTN, CKD3, GERD, HFrEF seen as Trauma consult s/p unwitnessed fall at nursing home .  Trauma assessment in ED: ABCs intact , GCS 14 , AAOx0.

## 2022-11-27 NOTE — PROGRESS NOTE ADULT - SUBJECTIVE AND OBJECTIVE BOX
GENERAL SURGERY PROGRESS NOTE    Patient: IDRIS BACON , 89y ([birth year])Male   MRN: [id number]  Location: [address]  Visit: 11-22-22 Inpatient  Date: 11-27-22 @ 04:09    Events of past 24 hours:  NAEON.  Patient seen resting comfortably in bed.  Venous duplex negative.    PAST MEDICAL & SURGICAL HISTORY:  Heart disease    Hypertension    Skin cancer    Presence of automatic cardioverter/defibrillator (AICD)    History of open heart surgery    FHx: skin cancer      Vitals:   T(F): 96.3 (11-27-22 @ 00:32), Max: 97 (11-26-22 @ 07:52)  HR: 69 (11-27-22 @ 00:32)  BP: 100/53 (11-27-22 @ 00:32)  RR: 18 (11-27-22 @ 00:32)  SpO2: 97% (11-27-22 @ 00:32)      Diet, DASH/TLC:   Sodium & Cholesterol Restricted      Fluids:     I & O's:    11-25-22 @ 07:01  -  11-26-22 @ 07:00  --------------------------------------------------------  IN:  Total IN: 0 mL    OUT:    Indwelling Catheter - Urethral (mL): 700 mL  Total OUT: 700 mL    Total NET: -700 mL    PHYSICAL EXAM:  General: NAD, lying in bed comfortably  Face: left cheek reconstruction with stable area of distal tip breakdown. No expressible drainage or purulence, fibrinous exudate.  Cardiac: RRR.  Respiratory: Bilateral chest rise.  Skin: Warm/dry, normal color, no jaundice.      MEDICATIONS  (STANDING):p  acetaminophen     Tablet .. 650 milliGRAM(s) Oral every 6 hours  aMIOdarone    Tablet 100 milliGRAM(s) Oral daily  cephalexin 500 milliGRAM(s) Oral every 12 hours  cyanocobalamin 1000 MICROGram(s) Oral daily  divalproex  milliGRAM(s) Oral every 12 hours  divalproex  milliGRAM(s) Oral at bedtime  gabapentin 100 milliGRAM(s) Oral three times a day  heparin   Injectable 5000 Unit(s) SubCutaneous every 8 hours  levothyroxine 25 MICROGram(s) Oral daily  lidocaine   4% Patch 1 Patch Transdermal daily  melatonin 5 milliGRAM(s) Oral at bedtime  memantine 5 milliGRAM(s) Oral daily  metoprolol tartrate 25 milliGRAM(s) Oral every 12 hours  midodrine 10 milliGRAM(s) Oral every 8 hours  ofloxacin 0.3% Solution 1 Drop(s) Both Ears three times a day  pantoprazole    Tablet 40 milliGRAM(s) Oral before breakfast  senna 2 Tablet(s) Oral at bedtime    MEDICATIONS  (PRN):      DVT PROPHYLAXIS: heparin   Injectable 5000 Unit(s) SubCutaneous every 8 hours    GI PROPHYLAXIS: pantoprazole    Tablet 40 milliGRAM(s) Oral before breakfast    ANTICOAGULATION:   ANTIBIOTICS:  cephalexin 500 milliGRAM(s)      LAB/STUDIES:  Labs:  CAPILLARY BLOOD GLUCOSE                          9.4    5.70  )-----------( 161      ( 27 Nov 2022 00:30 )             29.9         11-26    143  |  105  |  24<H>  ----------------------------<  79  4.6   |  33<H>  |  1.0      LFTs:             5.3  | 0.8  | 12       ------------------[79      ( 26 Nov 2022 00:53 )  2.1  | x    | <5          Lipase:x      Amylase:x           ABG - ( 23 Nov 2022 09:18 )  pH: 7.42  /  pCO2: 50    /  pO2: 69    / HCO3: 32    / Base Excess: 6.6   /  SaO2: 96.1        Coags:    Serum Pro-Brain Natriuretic Peptide: 24577 pg/mL (11-23-22 @ 11:54)      IMAGING:  < from: [facility] Duplex Lower Ext Vein Scan, Bilat (11.25.22 @ 17:17) >  No evidence of deep venous thrombosis or superficial thrombophlebitis in   the bilateral lower extremities.    < end of copied text >

## 2022-11-27 NOTE — PROGRESS NOTE ADULT - ASSESSMENT
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 Pain control. Lumbar brace.     HEENT: Oral care. Plastic surgery follow up. wound care     PULMONARY:  HOB @ 45 degrees.  Aspiration precautions. Wean O2, Incentive spirometry. keep SAo2 92 to 96%    CARDIOVASCULAR: keep MAP >60. Lasix 40 mg PO daily. Rate control  , midodrine 10 q 8    GI: GI prophylaxis.  Feeding.  Bowel regimen     RENAL:  Follow up lytes.  Correct as needed    INFECTIOUS DISEASE: abx per  ID, Fup inlf markers    HEMATOLOGICAL:  DVT prophylaxis. monitor CBC , FUP LE doppler -    ENDOCRINE:  Follow up FS.  Insulin protocol if needed.    MUSCULOSKELETAL: OOBC  / PT / OT    TRANSFER TO FLOOR  PT/OT

## 2022-11-27 NOTE — PHYSICAL THERAPY INITIAL EVALUATION ADULT - BALANCE TRAINING, PT EVAL
Inc sitting balance stc/dyn to G/F and standing balance P+/P to practice pre-gait training by VIVEK.

## 2022-11-27 NOTE — PROGRESS NOTE ADULT - SUBJECTIVE AND OBJECTIVE BOX
Over Night Events: events noted on NC, off pressors, ID. plastic noted  PHYSICAL EXAM    ICU Vital Signs Last 24 Hrs  T(C): 36.1 (27 Nov 2022 07:30), Max: 36.1 (26 Nov 2022 07:52)  T(F): 97 (27 Nov 2022 07:30), Max: 97 (26 Nov 2022 07:52)  HR: 59 (27 Nov 2022 07:30) (59 - 71)  BP: 104/56 (27 Nov 2022 07:30) (91/50 - 117/56)  BP(mean): 71 (27 Nov 2022 04:39) (71 - 74)  RR: 18 (27 Nov 2022 07:30) (18 - 20)  SpO2: 97% (27 Nov 2022 07:30) (96% - 100%)    O2 Parameters below as of 27 Nov 2022 04:39  Patient On (Oxygen Delivery Method): nasal cannula            General: chronically ill looking  Lungs: dec bs both bases  Cardiovascular: BENJAMIN 2.6  Abdomen: Soft, Positive BS  Neurological: Non focal       11-26-22 @ 07:01  -  11-27-22 @ 07:00  --------------------------------------------------------  IN:  Total IN: 0 mL    OUT:    Indwelling Catheter - Urethral (mL): 1200 mL  Total OUT: 1200 mL    Total NET: -1200 mL          LABS:                          9.4    5.70  )-----------( 161      ( 27 Nov 2022 00:30 )             29.9                                               11-27    145  |  105  |  26<H>  ----------------------------<  69<L>  4.5   |  35<H>  |  1.1    Ca    8.5      27 Nov 2022 00:30  Mg     1.9     11-27    TPro  5.7<L>  /  Alb  2.2<L>  /  TBili  1.0  /  DBili  x   /  AST  9   /  ALT  5   /  AlkPhos  75  11-27                                                                                           LIVER FUNCTIONS - ( 27 Nov 2022 00:30 )  Alb: 2.2 g/dL / Pro: 5.7 g/dL / ALK PHOS: 75 U/L / ALT: 5 U/L / AST: 9 U/L / GGT: x                                                                                                                                       MEDICATIONS  (STANDING):  acetaminophen     Tablet .. 650 milliGRAM(s) Oral every 6 hours  aMIOdarone    Tablet 100 milliGRAM(s) Oral daily  cephalexin 500 milliGRAM(s) Oral every 12 hours  cyanocobalamin 1000 MICROGram(s) Oral daily  divalproex  milliGRAM(s) Oral every 12 hours  divalproex  milliGRAM(s) Oral at bedtime  gabapentin 100 milliGRAM(s) Oral three times a day  heparin   Injectable 5000 Unit(s) SubCutaneous every 8 hours  levothyroxine 25 MICROGram(s) Oral daily  lidocaine   4% Patch 1 Patch Transdermal daily  melatonin 5 milliGRAM(s) Oral at bedtime  memantine 5 milliGRAM(s) Oral daily  metoprolol tartrate 25 milliGRAM(s) Oral every 12 hours  midodrine 10 milliGRAM(s) Oral every 8 hours  ofloxacin 0.3% Solution 1 Drop(s) Both Ears three times a day  pantoprazole    Tablet 40 milliGRAM(s) Oral before breakfast  senna 2 Tablet(s) Oral at bedtime

## 2022-11-27 NOTE — PROGRESS NOTE ADULT - ASSESSMENT
A/P: 89y male with dementia admitted s/p fall with rib fractures and right clavicle fracture. Plastic surgery following as patient is s/p SCC resection from left cheek with local flap coverage for reconstruction    - wound stable  - daily xeroform changes to face - may performed by RN  - continue abx   - few remaining sutures to remain in place     Plastic Surgery  Spectra [phone]

## 2022-11-28 NOTE — CHART NOTE - NSCHARTNOTEFT_GEN_A_CORE
Electrophysiology PA Note     Called by primary team  Patient is now comfort care  Documentation in the chart  Tachyarrhythmia therapies are off    patient remains BiV paced  bpm    call EP for any questions

## 2022-11-28 NOTE — CONSULT NOTE ADULT - ASSESSMENT
89yMale being evaluated for goals of care and symptom management. Pt w h/o SCC of face s/p procedure - see plastic surgery notes. Pt otherwise s/p fall in SNF. Pt has advanced dementia. Assist team w goals of care and symptom management. See previous [facility] notes w daughter.       MEDD (morphine equivalent daily dose):      See Recs below.    Please call [phone] with questions or concerns 24/7.   We will continue to follow.    89yMale being evaluated for goals of care and symptom management. Pt w h/o SCC of face s/p procedure - see plastic surgery notes. Pt otherwise s/p fall in SNF. Pt has advanced dementia. Assist team w goals of care and symptom management. See previous [facility] notes w daughter.     Pt with complaint of pain, denies dyspnea.           See Recs below.    Please call [phone] with questions or concerns 24/7.   We will continue to follow.

## 2022-11-28 NOTE — CONSULT NOTE ADULT - PROBLEM SELECTOR RECOMMENDATION 6
AICD to be deactivated  Ongoing comfort care  Roxanol 5mg SL for pain/dyspnea   PRN oxygen nasal cannula 2 liters for comfort

## 2022-11-28 NOTE — PROGRESS NOTE ADULT - ASSESSMENT
89yM w/ PMHx of Squamous cell carcinoma s/p resection and local flap with Dr. Doe 11/14/22, CAD s/p CABG 25 years ago, Afib and sick sinus syndrome s/p biventricular pacemaker and ICD, HTN, CKD3, GERD, HFrEF presented s/p unwitnessed fall at nursing home.  HO Chronic kidney diseaset was admitted under SICU - today he is in the CEU .    S/P Fall , Multiple skeletal injuries 2/2 fall, rib fx, clavicular fx,  age indeterminate compression fracture of L2-L4 vertebral bodies  Acute hypoxemic respiratory failure on 3 L NC improving/  b/l pleural effusions  COVID + ( was positive in June as well)   s/p SCC resection from left cheek with local flap coverage for reconstruction  Macrocytic anemia   Hx of frequent falls  HFrEF EF 32%  was 35-49 in May 2022  -Hx of  Sick sinus syndrome s/p Biv ICD  hx of chronic  AFib not on AC bcz of falls   hx of Lymphoma   Hx of dementia   Hx of  CKD 3  Elevated troponin - likely demands stable     the patient now is CMO only status   Palliative team on board   implement palliative team recommendations   consult EP for device de activation       I discussed with the patient daughter in details at bedside   pending: hospice                89yM w/ PMHx of Squamous cell carcinoma s/p resection and local flap with Dr. Doe 11/14/22, CAD s/p CABG 25 years ago, Afib and sick sinus syndrome s/p biventricular pacemaker and ICD, HTN, CKD3, GERD, HFrEF presented s/p unwitnessed fall at nursing home.  HO Chronic kidney diseaset was admitted under SICU - today he is in the CEU .    S/P Fall , Multiple skeletal injuries 2/2 fall, rib fx, clavicular fx,  age indeterminate compression fracture of L2-L4 vertebral bodies  Acute hypoxemic respiratory failure on 3 L NC improving/  b/l pleural effusions  COVID + ( was positive in June as well)   s/p SCC resection from left cheek with local flap coverage for reconstruction  Macrocytic anemia   Hx of frequent falls  HFrEF EF 32%  was 35-49 in May 2022  -Hx of  Sick sinus syndrome s/p Biv ICD  hx of chronic  AFib not on AC bcz of falls   hx of Lymphoma   Hx of dementia   Hx of  CKD 3  Elevated troponin - likely demands stable     the patient now is CMO only status   Palliative team on board   implement palliative team recommendations   consult EP for device de activation   if the patient would take some home meds - would increase synthroid to 50mcg     I discussed with the patient daughter in details at bedside   pending: hospice

## 2022-11-28 NOTE — PROGRESS NOTE ADULT - SUBJECTIVE AND OBJECTIVE BOX
PLASTIC SURGERY PROGRESS NOTE      Events of past 24 hours:  FRANDY.  Patient seen resting comfortably in bed.  Transferred to medical floor for CEU.       Vitals:      T(C): 36.1 (11-28-22 @ 05:00), Max: 36.2 (11-27-22 @ 20:40)  T(F): 96.9 (11-28-22 @ 05:00), Max: 97.2 (11-27-22 @ 20:40)  HR: 70 (11-28-22 @ 05:00) (59 - 70)  BP: 111/56 (11-28-22 @ 05:00) (92/53 - 111/56)  RR: 18 (11-28-22 @ 05:00) (18 - 20)  SpO2: 95% (11-28-22 @ 05:00) (94% - 97%)      27 Nov 2022 07:01  -  28 Nov 2022 07:00  --------------------------------------------------------  IN:  Total IN: 0 mL    OUT:    Indwelling Catheter - Urethral (mL): 30 mL  Total OUT: 30 mL    Total NET: -30 mL        PHYSICAL EXAM:  General: NAD, lying in bed comfortably  Face: left cheek reconstruction with stable area of distal tip breakdown. No expressible drainage or purulence, fibrinous exudate.  Cardiac: RRR.  Respiratory: Bilateral chest rise.  Skin: Warm/dry, normal color, no jaundice.                   9.2    6.50   )----------(  158       ( 28 Nov 2022 00:24 )               29.1      143    |  105    |  32     ----------------------------<  104        ( 28 Nov 2022 00:24 )  4.8     |  31     |  1.4      Ca    8.2        ( 28 Nov 2022 00:24 )  Mg     2.1       ( 28 Nov 2022 00:24 )    TPro  5.6    /  Alb  2.3    /  TBili  1.0    /  DBili  x      /  AST  8      /  ALT  5      /  AlkPhos  74     ( 28 Nov 2022 00:24 )        CAPILLARY BLOOD GLUCOSE      MEDICATIONS  (STANDING):  acetaminophen     Tablet .. 650 milliGRAM(s) Oral every 6 hours  aMIOdarone    Tablet 100 milliGRAM(s) Oral daily  cephalexin 500 milliGRAM(s) Oral every 12 hours  cyanocobalamin 1000 MICROGram(s) Oral daily  dextrose 5% + sodium chloride 0.45%. 1000 milliLiter(s) (50 mL/Hr) IV Continuous <Continuous>  divalproex  milliGRAM(s) Oral every 12 hours  divalproex  milliGRAM(s) Oral at bedtime  gabapentin 100 milliGRAM(s) Oral three times a day  heparin   Injectable 5000 Unit(s) SubCutaneous every 8 hours  levothyroxine 25 MICROGram(s) Oral daily  lidocaine   4% Patch 1 Patch Transdermal daily  melatonin 5 milliGRAM(s) Oral at bedtime  memantine 5 milliGRAM(s) Oral daily  metoprolol tartrate 25 milliGRAM(s) Oral every 12 hours  midodrine 10 milliGRAM(s) Oral every 8 hours  ofloxacin 0.3% Solution 1 Drop(s) Both Ears three times a day  pantoprazole    Tablet 40 milliGRAM(s) Oral before breakfast  senna 2 Tablet(s) Oral at bedtime    MEDICATIONS  (PRN):   PLASTIC SURGERY PROGRESS NOTE      Events of past 24 hours:  FRANDY.  Patient seen resting comfortably in bed.  Transferred to medical floor for CEU.  Informed by RN of Stage 2/3 sacral ulcer        Vitals:      T(C): 36.1 (11-28-22 @ 05:00), Max: 36.2 (11-27-22 @ 20:40)  T(F): 96.9 (11-28-22 @ 05:00), Max: 97.2 (11-27-22 @ 20:40)  HR: 70 (11-28-22 @ 05:00) (59 - 70)  BP: 111/56 (11-28-22 @ 05:00) (92/53 - 111/56)  RR: 18 (11-28-22 @ 05:00) (18 - 20)  SpO2: 95% (11-28-22 @ 05:00) (94% - 97%)      27 Nov 2022 07:01  -  28 Nov 2022 07:00  --------------------------------------------------------  IN:  Total IN: 0 mL    OUT:    Indwelling Catheter - Urethral (mL): 30 mL  Total OUT: 30 mL    Total NET: -30 mL        PHYSICAL EXAM:  General: NAD, lying in bed comfortably  Face: left cheek reconstruction with stable area of distal tip breakdown. No expressible drainage or purulence, fibrinous exudate.  Cardiac: RRR.  Respiratory: Bilateral chest rise.  Skin: Warm/dry, normal color, no jaundice.                   9.2    6.50   )----------(  158       ( 28 Nov 2022 00:24 )               29.1      143    |  105    |  32     ----------------------------<  104        ( 28 Nov 2022 00:24 )  4.8     |  31     |  1.4      Ca    8.2        ( 28 Nov 2022 00:24 )  Mg     2.1       ( 28 Nov 2022 00:24 )    TPro  5.6    /  Alb  2.3    /  TBili  1.0    /  DBili  x      /  AST  8      /  ALT  5      /  AlkPhos  74     ( 28 Nov 2022 00:24 )        CAPILLARY BLOOD GLUCOSE      MEDICATIONS  (STANDING):  acetaminophen     Tablet .. 650 milliGRAM(s) Oral every 6 hours  aMIOdarone    Tablet 100 milliGRAM(s) Oral daily  cephalexin 500 milliGRAM(s) Oral every 12 hours  cyanocobalamin 1000 MICROGram(s) Oral daily  dextrose 5% + sodium chloride 0.45%. 1000 milliLiter(s) (50 mL/Hr) IV Continuous <Continuous>  divalproex  milliGRAM(s) Oral every 12 hours  divalproex  milliGRAM(s) Oral at bedtime  gabapentin 100 milliGRAM(s) Oral three times a day  heparin   Injectable 5000 Unit(s) SubCutaneous every 8 hours  levothyroxine 25 MICROGram(s) Oral daily  lidocaine   4% Patch 1 Patch Transdermal daily  melatonin 5 milliGRAM(s) Oral at bedtime  memantine 5 milliGRAM(s) Oral daily  metoprolol tartrate 25 milliGRAM(s) Oral every 12 hours  midodrine 10 milliGRAM(s) Oral every 8 hours  ofloxacin 0.3% Solution 1 Drop(s) Both Ears three times a day  pantoprazole    Tablet 40 milliGRAM(s) Oral before breakfast  senna 2 Tablet(s) Oral at bedtime    MEDICATIONS  (PRN):

## 2022-11-28 NOTE — CONSULT NOTE ADULT - CONSULT REQUESTED DATE/TIME
22-Nov-2022 16:41
22-Nov-2022 18:04
28-Nov-2022 08:55
22-Nov-2022 13:48
22-Nov-2022 13:49
22-Nov-2022 15:30
22-Nov-2022 16:25
25-Nov-2022 09:03
23-Nov-2022 14:02

## 2022-11-28 NOTE — CONSULT NOTE ADULT - PROBLEM SELECTOR RECOMMENDATION 5
AICD to be deactivated  Ongoing comfort care  Roxanol 5mg SL for pain/dyspnea   PRN oxygen nasal cannula 2 liters for comfort -lorazepam 0.25mg IV q4h PRN for anxiety/agitation

## 2022-11-28 NOTE — CONSULT NOTE ADULT - SUBJECTIVE AND OBJECTIVE BOX
HPI:  TRAUMA ACTIVATION LEVEL: CONSULT  ACTIVATED BY: ED  INTUBATED: NO      MECHANISM OF INJURY:   [] Blunt     [] MVC	  [x] Fall	  [] Pedestrian Struck	  [] Motorcycle     [] Assault     [] Bicycle collision    [] Sports injury    [] Penetrating    [] Gun Shot Wound      [] Stab Wound    GCS: 14 	E: 4	V: 5	M: 5    HPI:    89yM w/ PMHx of Squamous cell carcinoma s/p resection and local flap with Dr. Doe 11/14/22, CAD s/p CABG 25 years ago, Afib and sick sinus syndrome s/p biventricular pacemaker and ICD, HTN, CKD3, GERD, HFrEF, lymphoma being followed by patient's oncologist seen as Trauma consult s/p unwitnessed fall at nursing home .  Trauma assessment in ED: ABCs intact , GCS 14 , AAOx0. Patient brought in by EMS, daughter at bedside to give history. Patient has history of frequent falls and is not on anticoagulation for that reason. She states his  baseline mental status is AAOx1-2, but he seemed more confused to her today. CTH negative, CT-Cspine negative. CT chest/abdomen/pelvis revealing R acromion fracture seen on CT vs. distal clavicular fracture on XR with associated 4x3 cm hematoma, b/l moderate-large pleural effusions, acute nondisplaced fractures of b/l 11-12 posterior ribs, age-indeterminate right 5-6 lateral rib fractures and left 10-11 lateral rib fractures, age-indeterminate L2-L4 compression deformities.   Non-traumatic injuries identified: heterogenous b/l renal cortical soft tissue masses suspicious of neoplasm, thickening of left anteriolateral bladder wall, cholelithiasis, fat-containing left inguinal hernia. (22 Nov 2022 14:42)    PERTINENT PM/SXH:   Heart disease    Hypertension    Skin cancer    Presence of automatic cardioverter/defibrillator (AICD)      History of open heart surgery    FHx: skin cancer      FAMILY HISTORY:    ITEMS NOT CHECKED ARE NOT PRESENT    SOCIAL HISTORY:   Significant other/partner[ ]  Children[x ]  Jewish/Spirituality:  Substance hx:  [ ]   Tobacco hx:  [ ]   Alcohol hx: [ ]   Living Situation: [ ]Home  [ x]Long term care  [ ]Rehab [ ]Other  Home Services: [ ] [facility]HA [ ] Ulisses RN [ ] Hospice  Occupation:  Home Opioid hx:  [ ] Y [x ] N [ x] I-Stop Reference No:  Esther Cantu | Reference #: [id number]    There are no results for the search terms that you entered.       ADVANCE DIRECTIVES:    [ ] Full Code [x ] DNR  MOLST  [x ]  Living Will  [ ]   DECISION MAKER(s):  [ ] Health Care Proxy(s)  [ ] Surrogate(s)  [ ] Guardian           Name(s): Phone Number(s):  Answers: Emergency Contact Name Marcie Leslie,  Answers: Emergency Contact Phone # [phone],  Answers: Emergency Contact Relationship dtr    BASELINE (I)ADL(s) (prior to admission):  [place]: [ ]Total  [ ] Moderate [ ]Dependent  Palliative Performance Status Version 2:         %    http://npcrc.org/files/news/palliative_performance_scale_[email or web address].pdf    Allergies    No Known Allergies    Intolerances    MEDICATIONS  (STANDING):  acetaminophen     Tablet .. 650 milliGRAM(s) Oral every 6 hours  aMIOdarone    Tablet 100 milliGRAM(s) Oral daily  cephalexin 500 milliGRAM(s) Oral every 12 hours  cyanocobalamin 1000 MICROGram(s) Oral daily  dextrose 5% + sodium chloride 0.45%. 1000 milliLiter(s) (50 mL/Hr) IV Continuous <Continuous>  divalproex  milliGRAM(s) Oral every 12 hours  divalproex  milliGRAM(s) Oral at bedtime  gabapentin 100 milliGRAM(s) Oral three times a day  heparin   Injectable 5000 Unit(s) SubCutaneous every 8 hours  levothyroxine 25 MICROGram(s) Oral daily  lidocaine   4% Patch 1 Patch Transdermal daily  melatonin 5 milliGRAM(s) Oral at bedtime  memantine 5 milliGRAM(s) Oral daily  metoprolol tartrate 25 milliGRAM(s) Oral every 12 hours  midodrine 10 milliGRAM(s) Oral every 8 hours  ofloxacin 0.3% Solution 1 Drop(s) Both Ears three times a day  pantoprazole    Tablet 40 milliGRAM(s) Oral before breakfast  senna 2 Tablet(s) Oral at bedtime    MEDICATIONS  (PRN):    PRESENT SYMPTOMS: [ ]Unable to obtain due to poor mentation   Source if other than patient:  [ ]Family   [ ]Team     Pain: [ ]yes [ ]no  QOL impact -   Location -                    Aggravating factors -  Quality -  Radiation -  Timing-  Severity (0-10 scale):  Minimal acceptable level (0-10 scale):     CPOT:    https://www.[facility].org/getattachment/[email or web address]/Critical-Care-Pain-Observation-Tool-(CPOT)      PAIN AD Score:     [email or web address] (press ctrl +  left click to view)      Dyspnea:                           [ ]Mild [ ]Moderate [ ]Severe [ ]None  Anxiety:                             [ ]Mild [ ]Moderate [ ]Severe [ ]None  Fatigue:                             [ ]Mild [ ]Moderate [ ]Severe [ ]None  Nausea:                             [ ]Mild [ ]Moderate [ ]Severe [ ]None  Loss of appetite:              [ ]Mild [ ]Moderate [ ]Severe [ ]None  Constipation:                    [ ]Mild [ ]Moderate [ ]Severe [ ]None    Other Symptoms:  [ ]All other review of systems negative     Palliative Performance Status Version 2:         %    http://[email or web address]/files/news/palliative_performance_scale_[email or web address].pdf  PHYSICAL EXAM:  Vital Signs Last 24 Hrs  T(C): 36.1 (28 Nov 2022 05:00), Max: 36.2 (27 Nov 2022 20:40)  T(F): 96.9 (28 Nov 2022 05:00), Max: 97.2 (27 Nov 2022 20:40)  HR: 70 (28 Nov 2022 05:00) (69 - 70)  BP: 111/56 (28 Nov 2022 05:00) (92/53 - 111/56)  BP(mean): --  RR: 18 (28 Nov 2022 05:00) (18 - 20)  SpO2: 95% (28 Nov 2022 05:00) (94% - 96%)    Parameters below as of 28 Nov 2022 05:00  Patient On (Oxygen Delivery Method): nasal cannula     I&O's Summary    27 Nov 2022 07:01  -  28 Nov 2022 07:00  --------------------------------------------------------  IN: 0 mL / OUT: 30 mL / NET: -30 mL        GENERAL:  [ ] No acute distress [ ]Lethargic  [ ]Unarousable  [ ]Verbal  [ ]Non-Verbal [ ]Cachexia    BEHAVIORAL/PSYCH:  [ ]Alert and Oriented x  [ ] Anxiety [ ] Delirium [ ] Agitation [ ] Calm   EYES: [ ] No scleral icterus [ ] Scleral icterus [ ] Closed  ENMT:  [ ]Dry mouth  [ ]No external oral lesions [ ] No external ear or nose lesions  CARDIOVASCULAR:  [ ]Regular [ ]Irregular [ ]Tachy [ ]Not Tachy  [ ]Jose Luis [ ] Edema [ ] No edema  PULMONARY:  [ ]Tachypnea  [ ]Audible excessive secretions [ ] No labored breathing [ ] labored breathing  GASTROINTESTINAL: [ ]Soft  [ ]Distended  [ ]Not distended [ ]Non tender [ ]Tender  MUSCULOSKELETAL: [ ]No clubbing [ ] clubbing  [ ] No cyanosis [ ] cyanosis  NEUROLOGIC: [ ]No focal deficits  [ ]Follows commands  [ ]Does not follow commands  [ ]Cognitive impairment  [ ]Dysphagia  [ ]Dysarthria  [ ]Paresis   SKIN: [ ] Jaundiced [ ] Non-jaundiced [ ]Rash [ ]No Rash [ ] Warm [ ] Dry  MISC/LINES: [ ] ET tube [ ] Trach [ ]NGT/OGT [ ]PEG [ ]Garcia    CRITICAL CARE:  [ ] Shock Present  [ ]Septic [ ]Cardiogenic [ ]Neurologic [ ]Hypovolemic  [ ]  Vasopressors [ ]  Inotropes   [ ]Respiratory failure present [ ]Mechanical ventilation [ ]Non-invasive ventilatory support [ ]High flow  [ ]Acute  [ ]Chronic [ ]Hypoxic  [ ]Hypercarbic [ ]Other  [ ]Other organ failure     LABS: reviewed by me                        9.2    6.50  )-----------( 158      ( 28 Nov 2022 00:24 )             29.1   11-28    143  |  105  |  32<H>  ----------------------------<  104<H>  4.8   |  31  |  1.4    Ca    8.2<L>      28 Nov 2022 00:24  Mg     2.1     11-28    TPro  5.6<L>  /  Alb  2.3<L>  /  TBili  1.0  /  DBili  x   /  AST  8   /  ALT  5   /  AlkPhos  74  11-28        RADIOLOGY & ADDITIONAL STUDIES: reviewed by me    EKG: reviewed by me      REFERRALS:   [ ]Chaplaincy  [ ]Hospice  [ ]Child Life  [ x]Social Work  [ x]Case management [ ]Holistic Therapy     Goals of Care Document:    HPI:  TRAUMA ACTIVATION LEVEL: CONSULT  ACTIVATED BY: ED  INTUBATED: NO      MECHANISM OF INJURY:   [] Blunt     [] MVC	  [x] Fall	  [] Pedestrian Struck	  [] Motorcycle     [] Assault     [] Bicycle collision    [] Sports injury    [] Penetrating    [] Gun Shot Wound      [] Stab Wound    GCS: 14 	E: 4	V: 5	M: 5    HPI:    89yM w/ PMHx of Squamous cell carcinoma s/p resection and local flap with Dr. Doe 11/14/22, CAD s/p CABG 25 years ago, Afib and sick sinus syndrome s/p biventricular pacemaker and ICD, HTN, CKD3, GERD, HFrEF, lymphoma being followed by patient's oncologist seen as Trauma consult s/p unwitnessed fall at nursing home .  Trauma assessment in ED: ABCs intact , GCS 14 , AAOx0. Patient brought in by EMS, daughter at bedside to give history. Patient has history of frequent falls and is not on anticoagulation for that reason. She states his  baseline mental status is AAOx1-2, but he seemed more confused to her today. CTH negative, CT-Cspine negative. CT chest/abdomen/pelvis revealing R acromion fracture seen on CT vs. distal clavicular fracture on XR with associated 4x3 cm hematoma, b/l moderate-large pleural effusions, acute nondisplaced fractures of b/l 11-12 posterior ribs, age-indeterminate right 5-6 lateral rib fractures and left 10-11 lateral rib fractures, age-indeterminate L2-L4 compression deformities.   Non-traumatic injuries identified: heterogenous b/l renal cortical soft tissue masses suspicious of neoplasm, thickening of left anteriolateral bladder wall, cholelithiasis, fat-containing left inguinal hernia. (22 Nov 2022 14:42)    PERTINENT PM/SXH:   Heart disease    Hypertension    Skin cancer    Presence of automatic cardioverter/defibrillator (AICD)      History of open heart surgery    FHx: skin cancer      FAMILY HISTORY:    ITEMS NOT CHECKED ARE NOT PRESENT    SOCIAL HISTORY:   Significant other/partner[ ]  Children[x ]  Mosque/Spirituality:  Substance hx:  [ ]   Tobacco hx:  [ ]   Alcohol hx: [ ]   Living Situation: [ ]Home  [ x]Long term care  [ ]Rehab [ ]Other  Home Services: [ ] HHA [ ] Ulisses RN [ ] Hospice  Occupation:  Home Opioid hx:  [ ] Y [x ] N [ x] I-Stop Reference No:  Esther Cantu | Reference #: [id number]    There are no results for the search terms that you entered.       ADVANCE DIRECTIVES:    [ ] Full Code [x ] DNR  MOLST  [x ]  Living Will  [ ]   DECISION MAKER(s):  [ ] Health Care Proxy(s)  [ ] Surrogate(s)  [ ] Guardian           Name(s): Phone Number(s):  Answers: Emergency Contact Name Marcie Leslie,  Answers: Emergency Contact Phone # [phone],  Answers: Emergency Contact Relationship dtr    BASELINE (I)ADL(s) (prior to admission):  [place]: [ ]Total  [ ] Moderate [x ]Dependent  Palliative Performance Status Version 2:         %    http://npcrc.org/files/news/palliative_performance_scale_ppsv2.pdf    Allergies    No Known Allergies    Intolerances    MEDICATIONS  (STANDING):  acetaminophen     Tablet .. 650 milliGRAM(s) Oral every 6 hours  aMIOdarone    Tablet 100 milliGRAM(s) Oral daily  cephalexin 500 milliGRAM(s) Oral every 12 hours  cyanocobalamin 1000 MICROGram(s) Oral daily  dextrose 5% + sodium chloride 0.45%. 1000 milliLiter(s) (50 mL/Hr) IV Continuous <Continuous>  divalproex  milliGRAM(s) Oral every 12 hours  divalproex  milliGRAM(s) Oral at bedtime  gabapentin 100 milliGRAM(s) Oral three times a day  heparin   Injectable 5000 Unit(s) SubCutaneous every 8 hours  levothyroxine 25 MICROGram(s) Oral daily  lidocaine   4% Patch 1 Patch Transdermal daily  melatonin 5 milliGRAM(s) Oral at bedtime  memantine 5 milliGRAM(s) Oral daily  metoprolol tartrate 25 milliGRAM(s) Oral every 12 hours  midodrine 10 milliGRAM(s) Oral every 8 hours  ofloxacin 0.3% Solution 1 Drop(s) Both Ears three times a day  pantoprazole    Tablet 40 milliGRAM(s) Oral before breakfast  senna 2 Tablet(s) Oral at bedtime    MEDICATIONS  (PRN):    PRESENT SYMPTOMS: [ ]Unable to obtain due to poor mentation   Source if other than patient:  [ ]Family   [ ]Team     Pain: [x ]yes [ ]no  - pt unable to express / or quantify pain         Dyspnea:                           [ ]Mild [ ]Moderate [ ]Severe [ ]None  Anxiety:                             [ ]Mild [ ]Moderate [ ]Severe [ ]None  Fatigue:                             [ ]Mild [ ]Moderate [ ]Severe [ ]None  Nausea:                             [ ]Mild [ ]Moderate [ ]Severe [ ]None  Loss of appetite:              [ ]Mild [ ]Moderate [ ]Severe [ ]None  Constipation:                    [ ]Mild [ ]Moderate [ ]Severe [ ]None    Other Symptoms:  [x ]All other review of systems negative     PHYSICAL EXAM:  Vital Signs Last 24 Hrs  T(C): 36.1 (28 Nov 2022 05:00), Max: 36.2 (27 Nov 2022 20:40)  T(F): 96.9 (28 Nov 2022 05:00), Max: 97.2 (27 Nov 2022 20:40)  HR: 70 (28 Nov 2022 05:00) (69 - 70)  BP: 111/56 (28 Nov 2022 05:00) (92/53 - 111/56)  BP(mean): --  RR: 18 (28 Nov 2022 05:00) (18 - 20)  SpO2: 95% (28 Nov 2022 05:00) (94% - 96%)    Parameters below as of 28 Nov 2022 05:00  Patient On (Oxygen Delivery Method): nasal cannula     I&O's Summary    27 Nov 2022 07:01  -  28 Nov 2022 07:00  --------------------------------------------------------  IN: 0 mL / OUT: 30 mL / NET: -30 mL        GENERAL:  [ x] No acute distress [ ]Lethargic  [ ]Unarousable  [ ]Verbal  [ ]Non-Verbal [ ]Cachexia    BEHAVIORAL/PSYCH:  [x ]Alert and Oriented x  1[ ] Anxiety [ ] Delirium [ ] Agitation [ x] Calm   EYES: [x ] No scleral icterus [ ] Scleral icterus [ ] Closed  ENMT:  [ ]Dry mouth  [ ]No external oral lesions [ ] No external ear or nose lesions  CARDIOVASCULAR:  [ ]Regular [ ]Irregular [ ]Tachy [ ]Not Tachy  [ ]Jose Luis [ ] Edema [ x] No edema  PULMONARY:  [ ]Tachypnea  [ ]Audible excessive secretions [ ] No labored breathing [x ] labored breathing  GASTROINTESTINAL: [x ]Soft  [ ]Distended  [ ]Not distended [ ]Non tender [ ]Tender  MUSCULOSKELETAL: [x ]No clubbing [ ] clubbing  [ x] No cyanosis [ ] cyanosis  NEUROLOGIC: [ ]No focal deficits  [ ]Follows commands  [ ]Does not follow commands  [ x]Cognitive impairment  [ ]Dysphagia  [ x]Dysarthria  [ ]Paresis   SKIN: [ ] Jaundiced [ ] Non-jaundiced [ ]Rash [x ]No Rash [ ] Warm [ ] Dry  MISC/LINES: [ ] ET tube [ ] Trach [ ]NGT/OGT [ ]PEG [ x]Garcia    CRITICAL CARE:  [ ] Shock Present  [ ]Septic [ ]Cardiogenic [ ]Neurologic [ ]Hypovolemic  [ ]  Vasopressors [ ]  Inotropes   [ ]Respiratory failure present [ ]Mechanical ventilation [ ]Non-invasive ventilatory support [ ]High flow  [ ]Acute  [ ]Chronic [ ]Hypoxic  [ ]Hypercarbic [ ]Other  [ ]Other organ failure     LABS: reviewed by me                        9.2    6.50  )-----------( 158      ( 28 Nov 2022 00:24 )             29.1   11-28    143  |  105  |  32<H>  ----------------------------<  104<H>  4.8   |  31  |  1.4    Ca    8.2<L>      28 Nov 2022 00:24  Mg     2.1     11-28    TPro  5.6<L>  /  Alb  2.3<L>  /  TBili  1.0  /  DBili  x   /  AST  8   /  ALT  5   /  AlkPhos  74  11-28        RADIOLOGY & ADDITIONAL STUDIES: reviewed by me    EKG: reviewed by me      REFERRALS:   [ ]Chaplaincy  [x ]Hospice  [ ]Child Life  [ x]Social Work  [ x]Case management [ ]Holistic Therapy     Goals of Care Document:    HPI:    89yM w/ PMHx of Squamous cell carcinoma s/p resection and local flap with Dr. Doe 11/14/22, CAD s/p CABG 25 years ago, Afib and sick sinus syndrome s/p biventricular pacemaker and ICD, HTN, CKD3, GERD, HFrEF, lymphoma being followed by patient's oncologist seen as Trauma consult s/p unwitnessed fall at nursing home .  Trauma assessment in ED: ABCs intact , GCS 14 , AAOx0. Patient brought in by EMS, daughter at bedside to give history. Patient has history of frequent falls and is not on anticoagulation for that reason. She states his  baseline mental status is AAOx1-2, but he seemed more confused to her today. CTH negative, CT-Cspine negative. CT chest/abdomen/pelvis revealing R acromion fracture seen on CT vs. distal clavicular fracture on XR with associated 4x3 cm hematoma, b/l moderate-large pleural effusions, acute nondisplaced fractures of b/l 11-12 posterior ribs, age-indeterminate right 5-6 lateral rib fractures and left 10-11 lateral rib fractures, age-indeterminate L2-L4 compression deformities.   Non-traumatic injuries identified: heterogenous b/l renal cortical soft tissue masses suspicious of neoplasm, thickening of left anteriolateral bladder wall, cholelithiasis, fat-containing left inguinal hernia. (22 Nov 2022 14:42)    PERTINENT PM/SXH:   Heart disease    Hypertension    Skin cancer    Presence of automatic cardioverter/defibrillator (AICD)      History of open heart surgery    FHx: skin cancer    ITEMS NOT CHECKED ARE NOT PRESENT    SOCIAL HISTORY:   Significant other/partner[ ]  Children[x ]  Protestant/Spirituality:  Substance hx:  [ ]   Tobacco hx:  [ ]   Alcohol hx: [ ]   Living Situation: [ ]Home  [ x]Long term care  [ ]Rehab [ ]Other  Home Services: [ ] HHA [ ] Visting RN [ ] Hospice  Occupation:  Home Opioid hx:  [ ] Y [x ] N [ x] I-Stop Reference No:  Esther Cantu | Reference #: [id number]    There are no results for the search terms that you entered.       ADVANCE DIRECTIVES:    [ ] Full Code [x ] DNR  MOLST  [x ]  Living Will  [ ]   DECISION MAKER(s):  [ ] Health Care Proxy(s)  [ ] Surrogate(s)  [ ] Guardian           Name(s): Phone Number(s):  Answers: Emergency Contact Name Marcie Leslie,  Answers: Emergency Contact Phone # [phone],  Answers: Emergency Contact Relationship dtr    BASELINE (I)ADL(s) (prior to admission):  [place]: [ ]Total  [ ] Moderate [x ]Dependent  Palliative Performance Status Version 2:         %    [email or web address]/files/[email or web address]_performance_scale_[email or web address].pdf    Allergies    No Known Allergies    Intolerances    MEDICATIONS  (STANDING):  acetaminophen     Tablet .. 650 milliGRAM(s) Oral every 6 hours  aMIOdarone    Tablet 100 milliGRAM(s) Oral daily  cephalexin 500 milliGRAM(s) Oral every 12 hours  cyanocobalamin 1000 MICROGram(s) Oral daily  dextrose 5% + sodium chloride 0.45%. 1000 milliLiter(s) (50 mL/Hr) IV Continuous <Continuous>  divalproex  milliGRAM(s) Oral every 12 hours  divalproex  milliGRAM(s) Oral at bedtime  gabapentin 100 milliGRAM(s) Oral three times a day  heparin   Injectable 5000 Unit(s) SubCutaneous every 8 hours  levothyroxine 25 MICROGram(s) Oral daily  lidocaine   4% Patch 1 Patch Transdermal daily  melatonin 5 milliGRAM(s) Oral at bedtime  memantine 5 milliGRAM(s) Oral daily  metoprolol tartrate 25 milliGRAM(s) Oral every 12 hours  midodrine 10 milliGRAM(s) Oral every 8 hours  ofloxacin 0.3% Solution 1 Drop(s) Both Ears three times a day  pantoprazole    Tablet 40 milliGRAM(s) Oral before breakfast  senna 2 Tablet(s) Oral at bedtime    MEDICATIONS  (PRN):    PRESENT SYMPTOMS: [ ]Unable to obtain due to poor mentation   Source if other than patient:  [ ]Family   [ ]Team     Pain: [x ]yes [ ]no  - pt unable to express / or quantify pain         Dyspnea:                           [ ]Mild [ ]Moderate [ ]Severe [ ]None  Anxiety:                             [ ]Mild [ ]Moderate [ ]Severe [ ]None  Fatigue:                             [ ]Mild [ ]Moderate [ ]Severe [ ]None  Nausea:                             [ ]Mild [ ]Moderate [ ]Severe [ ]None  Loss of appetite:              [ ]Mild [ ]Moderate [ ]Severe [ ]None  Constipation:                    [ ]Mild [ ]Moderate [ ]Severe [ ]None    Other Symptoms:  [x ]All other review of systems negative     PHYSICAL EXAM:  Vital Signs Last 24 Hrs  T(C): 36.1 (28 Nov 2022 05:00), Max: 36.2 (27 Nov 2022 20:40)  T(F): 96.9 (28 Nov 2022 05:00), Max: 97.2 (27 Nov 2022 20:40)  HR: 70 (28 Nov 2022 05:00) (69 - 70)  BP: 111/56 (28 Nov 2022 05:00) (92/53 - 111/56)  BP(mean): --  RR: 18 (28 Nov 2022 05:00) (18 - 20)  SpO2: 95% (28 Nov 2022 05:00) (94% - 96%)    Parameters below as of 28 Nov 2022 05:00  Patient On (Oxygen Delivery Method): nasal cannula     I&O's Summary    27 Nov 2022 07:01  -  28 Nov 2022 07:00  --------------------------------------------------------  IN: 0 mL / OUT: 30 mL / NET: -30 mL        GENERAL:  [ x] No acute distress [ ]Lethargic  [ ]Unarousable  [ ]Verbal  [ ]Non-Verbal [ ]Cachexia    BEHAVIORAL/PSYCH:  [x ]Alert and Oriented x  1[ ] Anxiety [ ] Delirium [ ] Agitation [ x] Calm   EYES: [x ] No scleral icterus [ ] Scleral icterus [ ] Closed  ENMT:  [ ]Dry mouth  [ x]No external oral lesions [ ] No external ear or nose lesions  CARDIOVASCULAR:  [ ]Regular [ ]Irregular [ ]Tachy [ ]Not Tachy  [ ]Jose Luis [ ] Edema [ x] No edema  PULMONARY:  [ ]Tachypnea  [ ]Audible excessive secretions [ ] No labored breathing [x ] labored breathing  GASTROINTESTINAL: [x ]Soft  [ ]Distended  [ ]Not distended [ ]Non tender [ ]Tender  MUSCULOSKELETAL: [x ]No clubbing [ ] clubbing  [ x] No cyanosis [ ] cyanosis  NEUROLOGIC: [ ]No focal deficits  [ ]Follows commands  [ ]Does not follow commands  [ x]Cognitive impairment  [ ]Dysphagia  [ x]Dysarthria  [ ]Paresis   SKIN: [ ] Jaundiced [ ] Non-jaundiced [ ]Rash [x ]No Rash [ ] Warm [ ] Dry  MISC/LINES: [ ] ET tube [ ] Trach [ ]NGT/OGT [ ]PEG [ x]Garcia    LABS: reviewed by me                        9.2    6.50  )-----------( 158      ( 28 Nov 2022 00:24 )             29.1   11-28    143  |  105  |  32<H>  ----------------------------<  104<H>  4.8   |  31  |  1.4    Ca    8.2<L>      28 Nov 2022 00:24  Mg     2.1     11-28    TPro  5.6<L>  /  Alb  2.3<L>  /  TBili  1.0  /  DBili  x   /  AST  8   /  ALT  5   /  AlkPhos  74  11-28        RADIOLOGY & ADDITIONAL STUDIES: reviewed by me  < from: [facility] Duplex Lower Ext Vein Scan, Bilat (11.25.22 @ 17:17) >    No evidence of deep venous thrombosis or superficial thrombophlebitis in   the bilateral lower extremities.    < end of copied text >      EKG: reviewed by me  < from: 12 Lead ECG (11.23.22 @ 04:30) >    Ventricular Rate 75 BPM    Atrial Rate 94 BPM    QRS Duration 152 ms    Q-T Interval 492 ms    QTC Calculation(Bazett) 549 ms    R Axis 249 degrees    T Axis 50 degrees    Diagnosis Line Ventricular-paced rhythm    Abnormal ECG    < end of copied text >      REFERRALS:   [ ]Chaplaincy  [x ]Hospice  [ ]Child Life  [ x]Social Work  [ x]Case management [ ]Holistic Therapy     Goals of Care Document:

## 2022-11-28 NOTE — CONSULT NOTE ADULT - NS ATTEND AMEND GEN_ALL_CORE FT
89 year old man with history as above  Patient also noted to have advanced dementia per daughter  Spoke with daughter  She wishes for hospice with comfort measures only  Symptom management as above    High risk patient made comfort measures only with IV opioids for pain/dyspnea relief

## 2022-11-28 NOTE — HOSPICE CARE NOTE - CONVESATION DETAILS
Hospice referral completed. Phone call to patient's daughter Marcie [phone]. reviewed hospice services for home ([facility]) and for [facility]. All questions answered. Marcie said she needs to discuss options with her brothers before she makes a decision. Hospice contact information provided.

## 2022-11-28 NOTE — CONSULT NOTE ADULT - PROBLEM SELECTOR RECOMMENDATION 2
DNR/DNI/CMO -DNR/DNI  -Comfort measures only  -MOLST filled out and placed in chart  -No additional IVF, artificial nutrition, blood draws, vital signs, pressors, antibiotics, escalation of oxygen, escalation of care  -Comfort feeds OK  -Hospice consult placed

## 2022-11-28 NOTE — CONSULT NOTE ADULT - PROVIDER SPECIALTY LIST ADULT
Neurosurgery
Orthopedics
Plastic Surgery
Critical Care
Palliative Care
Physiatry
SICU
Trauma Surgery
Infectious Disease

## 2022-11-28 NOTE — PROGRESS NOTE ADULT - SUBJECTIVE AND OBJECTIVE BOX
T H I S   I S    N O  T   A    F I N A L I Z E D   N O T LEEANNA BACON, IDRIS  89y, Male  Allergy: No Known Allergies    Hospital Day: 6d    Patient seen and examined earlier today.     PMH/PSH:  PAST MEDICAL & SURGICAL HISTORY:  Heart disease      Hypertension      Skin cancer      Presence of automatic cardioverter/defibrillator (AICD)      History of open heart surgery      FHx: skin cancer          LAST 24-Hr EVENTS:    VITALS:  T(F): 96.9 (11-28-22 @ 05:00), Max: 97.2 (11-27-22 @ 20:40)  HR: 70 (11-28-22 @ 05:00)  BP: 111/56 (11-28-22 @ 05:00) (92/53 - 111/56)  RR: 18 (11-28-22 @ 05:00)  SpO2: 95% (11-28-22 @ 05:00)          TESTS & MEASUREMENTS:  Weight/BMI      11-26-22 @ 07:01  -  11-27-22 @ 07:00  --------------------------------------------------------  IN: 0 mL / OUT: 1200 mL / NET: -1200 mL    11-27-22 @ 07:01  -  11-28-22 @ 07:00  --------------------------------------------------------  IN: 0 mL / OUT: 30 mL / NET: -30 mL                            9.2    6.50  )-----------( 158      ( 28 Nov 2022 00:24 )             29.1         11-28    143  |  105  |  32<H>  ----------------------------<  104<H>  4.8   |  31  |  1.4    Ca    8.2<L>      28 Nov 2022 00:24  Mg     2.1     11-28    TPro  5.6<L>  /  Alb  2.3<L>  /  TBili  1.0  /  DBili  x   /  AST  8   /  ALT  5   /  AlkPhos  74  11-28    LIVER FUNCTIONS - ( 28 Nov 2022 00:24 )  Alb: 2.3 g/dL / Pro: 5.6 g/dL / ALK PHOS: 74 U/L / ALT: 5 U/L / AST: 8 U/L / GGT: x                   Procalcitonin, Serum: 0.06 ng/mL (11-26-22 @ 00:53)    D-Dimer Assay, Quantitative: 609 ng/mL DDU (11-26-22 @ 00:53)    Ferritin, Serum: 437 ng/mL (11-26-22 @ 00:53)    Serum Pro-Brain Natriuretic Peptide: 47962 pg/mL (11-23-22 @ 11:54)    COVID-19 PCR: Detected (11-22-22 @ 10:40)        A1C with Estimated Average Glucose Result: 5.4 % (05-09-22 @ 07:10)      Indwelling Urethral Catheter:     Connect To:  Straight Drainage/Gravity    Indication:  Urine Output Monitoring in Critically Ill (11-25-22 @ 08:38) (not performed)  Indwelling Urethral Catheter:     Connect To:  Straight Drainage/Gravity    Indication:  Urine Output Monitoring in Critically Ill (11-23-22 @ 17:35) (not performed)      RADIOLOGY, ECG, & ADDITIONAL TESTS:  12 Lead ECG:   Ventricular Rate 75 BPM    Atrial Rate 94 BPM    QRS Duration 152 ms    Q-T Interval 492 ms    QTC Calculation(Bazett) 549 ms    R Axis 249 degrees    T Axis 50 degrees    Diagnosis Line Ventricular-paced rhythm    Abnormal ECG    Confirmed by ARTHUR NANCE MD ([id number]) on 11/23/2022 7:03:57 AM (11-23-22 @ 04:30)    CT Head No Cont:   ACC: [id number] EXAM:  CT BRAIN                          PROCEDURE DATE:  11/22/2022          INTERPRETATION:  Clinical History / Reason for exam: Trauma    Technique: Noncontrast head CT.  Contiguous unenhanced CT axial images of   the head from thebase to the vertex with coronal and sagittal reformats.    Comparison: CT head dated 6/25/2022.    Findings:    The ventricles and cortical sulci are within normal limits for age.    There are stable patchy and confluent hypodensities throughout the   hemispheric white matter without mass effect compatible with chronic   microvascular changes.    There is no acute intracranial hemorrhage, extra-axial fluid collection   or midline shift.  Gray white matter differentiation is maintained.    Vascular calcifications are noted.    The visualized paranasal sinuses and mastoids are clear.    IMPRESSION:    1.  No evidence of acute intracranial pathology. Stable exam since   6/25/2022.    2.  Stable moderate/severe chronic microvascular changes.    --- End of Report ---            RAFAEL CUBA MD; Attending Radiologist  This document has been electronically signed. Nov 22 2022 11:25AM (11-22-22 @ 11:09)    RECENT DIAGNOSTIC ORDERS:      MEDICATIONS:  MEDICATIONS  (STANDING):  acetaminophen     Tablet .. 650 milliGRAM(s) Oral every 6 hours  cephalexin 500 milliGRAM(s) Oral every 12 hours  cyanocobalamin 1000 MICROGram(s) Oral daily  divalproex  milliGRAM(s) Oral every 12 hours  divalproex  milliGRAM(s) Oral at bedtime  gabapentin 100 milliGRAM(s) Oral three times a day  levothyroxine 25 MICROGram(s) Oral daily  lidocaine   4% Patch 1 Patch Transdermal daily  melatonin 5 milliGRAM(s) Oral at bedtime  memantine 5 milliGRAM(s) Oral daily  metoprolol tartrate 25 milliGRAM(s) Oral every 12 hours  ofloxacin 0.3% Solution 1 Drop(s) Both Ears three times a day  pantoprazole    Tablet 40 milliGRAM(s) Oral before breakfast  senna 2 Tablet(s) Oral at bedtime    MEDICATIONS  (PRN):  morphine Concentrate 5 milliGRAM(s) SubLingual every 4 hours PRN pain or dyspnea      HOME MEDICATIONS:  amiodarone 100 mg oral tablet (11-14)  Aspirin Low Dose 81 mg oral tablet, chewable (11-14)  benazepril 20 mg oral tablet (11-14)  cephalexin 500 mg oral tablet (11-14)  cyanocobalamin 100 mcg oral tablet (11-14)  levothyroxine 25 mcg (0.025 mg) oral tablet (11-14)  Melatonin 5 mg oral tablet (11-14)  memantine 5 mg oral tablet (11-14)  metoprolol tartrate 25 mg oral tablet (11-14)  omeprazole 20 mg oral delayed release capsule (11-14)  Vitamin B12 50 mcg oral tablet (11-14)      PHYSICAL EXAM:  GENERAL:   CHEST/LUNG:   HEART:   ABDOMEN:   EXTREMITIES:               ARLEEN, IDRIS  89y, Male  Allergy: No Known Allergies    Hospital Day: 6d    Patient seen and examined earlier today. he is awake but confused, his daughter at bedside - she confirmed that she wants him to CMO only and to deactive the cardiac device     PMH/PSH:  PAST MEDICAL & SURGICAL HISTORY:  Heart disease      Hypertension      Skin cancer      Presence of automatic cardioverter/defibrillator (AICD)      History of open heart surgery      FHx: skin cancer          LAST 24-Hr EVENTS:    VITALS:  T(F): 96.9 (11-28-22 @ 05:00), Max: 97.2 (11-27-22 @ 20:40)  HR: 70 (11-28-22 @ 05:00)  BP: 111/56 (11-28-22 @ 05:00) (92/53 - 111/56)  RR: 18 (11-28-22 @ 05:00)  SpO2: 95% (11-28-22 @ 05:00)          TESTS & MEASUREMENTS:  Weight/BMI      11-26-22 @ 07:01  -  11-27-22 @ 07:00  --------------------------------------------------------  IN: 0 mL / OUT: 1200 mL / NET: -1200 mL    11-27-22 @ 07:01  -  11-28-22 @ 07:00  --------------------------------------------------------  IN: 0 mL / OUT: 30 mL / NET: -30 mL                            9.2    6.50  )-----------( 158      ( 28 Nov 2022 00:24 )             29.1         11-28    143  |  105  |  32<H>  ----------------------------<  104<H>  4.8   |  31  |  1.4    Ca    8.2<L>      28 Nov 2022 00:24  Mg     2.1     11-28    TPro  5.6<L>  /  Alb  2.3<L>  /  TBili  1.0  /  DBili  x   /  AST  8   /  ALT  5   /  AlkPhos  74  11-28    LIVER FUNCTIONS - ( 28 Nov 2022 00:24 )  Alb: 2.3 g/dL / Pro: 5.6 g/dL / ALK PHOS: 74 U/L / ALT: 5 U/L / AST: 8 U/L / GGT: x                   Procalcitonin, Serum: 0.06 ng/mL (11-26-22 @ 00:53)    D-Dimer Assay, Quantitative: 609 ng/mL DDU (11-26-22 @ 00:53)    Ferritin, Serum: 437 ng/mL (11-26-22 @ 00:53)    Serum Pro-Brain Natriuretic Peptide: 07315 pg/mL (11-23-22 @ 11:54)    COVID-19 PCR: Detected (11-22-22 @ 10:40)        A1C with Estimated Average Glucose Result: 5.4 % (05-09-22 @ 07:10)      Indwelling Urethral Catheter:     Connect To:  Straight Drainage/Gravity    Indication:  Urine Output Monitoring in Critically Ill (11-25-22 @ 08:38) (not performed)  Indwelling Urethral Catheter:     Connect To:  Straight Drainage/Gravity    Indication:  Urine Output Monitoring in Critically Ill (11-23-22 @ 17:35) (not performed)      RADIOLOGY, ECG, & ADDITIONAL TESTS:  12 Lead ECG:   Ventricular Rate 75 BPM    Atrial Rate 94 BPM    QRS Duration 152 ms    Q-T Interval 492 ms    QTC Calculation(Bazett) 549 ms    R Axis 249 degrees    T Axis 50 degrees    Diagnosis Line Ventricular-paced rhythm    Abnormal ECG    Confirmed by ARTHUR NANCE MD ([id number]) on 11/23/2022 7:03:57 AM (11-23-22 @ 04:30)    CT Head No Cont:   ACC: [id number] EXAM:  CT BRAIN                          PROCEDURE DATE:  11/22/2022          INTERPRETATION:  Clinical History / Reason for exam: Trauma    Technique: Noncontrast head CT.  Contiguous unenhanced CT axial images of   the head from thebase to the vertex with coronal and sagittal reformats.    Comparison: CT head dated 6/25/2022.    Findings:    The ventricles and cortical sulci are within normal limits for age.    There are stable patchy and confluent hypodensities throughout the   hemispheric white matter without mass effect compatible with chronic   microvascular changes.    There is no acute intracranial hemorrhage, extra-axial fluid collection   or midline shift.  Gray white matter differentiation is maintained.    Vascular calcifications are noted.    The visualized paranasal sinuses and mastoids are clear.    IMPRESSION:    1.  No evidence of acute intracranial pathology. Stable exam since   6/25/2022.    2.  Stable moderate/severe chronic microvascular changes.    --- End of Report ---            RAFAEL CUBA MD; Attending Radiologist  This document has been electronically signed. Nov 22 2022 11:25AM (11-22-22 @ 11:09)    RECENT DIAGNOSTIC ORDERS:      MEDICATIONS:  MEDICATIONS  (STANDING):  acetaminophen     Tablet .. 650 milliGRAM(s) Oral every 6 hours  cephalexin 500 milliGRAM(s) Oral every 12 hours  cyanocobalamin 1000 MICROGram(s) Oral daily  divalproex  milliGRAM(s) Oral every 12 hours  divalproex  milliGRAM(s) Oral at bedtime  gabapentin 100 milliGRAM(s) Oral three times a day  levothyroxine 25 MICROGram(s) Oral daily  lidocaine   4% Patch 1 Patch Transdermal daily  melatonin 5 milliGRAM(s) Oral at bedtime  memantine 5 milliGRAM(s) Oral daily  metoprolol tartrate 25 milliGRAM(s) Oral every 12 hours  ofloxacin 0.3% Solution 1 Drop(s) Both Ears three times a day  pantoprazole    Tablet 40 milliGRAM(s) Oral before breakfast  senna 2 Tablet(s) Oral at bedtime    MEDICATIONS  (PRN):  morphine Concentrate 5 milliGRAM(s) SubLingual every 4 hours PRN pain or dyspnea      HOME MEDICATIONS:  amiodarone 100 mg oral tablet (11-14)  Aspirin Low Dose 81 mg oral tablet, chewable (11-14)  benazepril 20 mg oral tablet (11-14)  cephalexin 500 mg oral tablet (11-14)  cyanocobalamin 100 mcg oral tablet (11-14)  levothyroxine 25 mcg (0.025 mg) oral tablet (11-14)  Melatonin 5 mg oral tablet (11-14)  memantine 5 mg oral tablet (11-14)  metoprolol tartrate 25 mg oral tablet (11-14)  omeprazole 20 mg oral delayed release capsule (11-14)  Vitamin B12 50 mcg oral tablet (11-14)      PHYSICAL EXAM:  GENERAL: sleepy but easy araousable , follow simple command, frail, left side of face skin lesions with dressing , left eye discharge   CHEST/LUNG:  decrease breath sound b/l   HEART:  regular rhythm   ABDOMEN:  soft, non tender   EXTREMITIES:  no ivonne

## 2022-11-28 NOTE — CONSULT NOTE ADULT - CONVERSATION DETAILS
Palliative Care   met with daughter who is at bedside. Reviewed pt current condition and treatment. Pt has been medically declining since may of 2022 after having several falls. He has been seeing a neurologist who said he is stage 7 dementia. Daughter aware his disease is very advanced. He also has end stage heart failure. Recent fall fractures several ribs and fractured shoulder.     We discussed hospice. Pt is DNR/DNI. He has a living will c/w comfort as a goal st this stage of his illnesses. Daughter agreed that it is time for hospice.     Comfort care discussed, and daughter would also like to implement comfort care now. Reviewed no labs, tests, IVF, artificial nutrition, no escalation of care. She also requested his AICD be deactivated as she spoke to his cardiologist.

## 2022-11-28 NOTE — CONSULT NOTE ADULT - PROBLEM SELECTOR RECOMMENDATION 9
Pt with squamous cell cancer, advanced illness - no disease directed care. Pt with squamous cell cancer, advanced illness - no disease directed care.  -comfort measures only  -Hospice consult

## 2022-11-28 NOTE — PROGRESS NOTE ADULT - ASSESSMENT
A/P: 89y male with dementia admitted s/p fall with rib fractures and right clavicle fracture. Plastic surgery following as patient is s/p SCC resection from left cheek with local flap coverage for reconstruction.    - wound stable  - daily xeroform changes to face - may performed by RN  - continue abx   - few remaining sutures to remain in place for time being    Plastic Surgery  Spectra [phone] A/P: 89y male with dementia admitted s/p fall with rib fractures and right clavicle fracture. Plastic surgery following as patient is s/p SCC resection from left cheek with local flap coverage for reconstruction.    - wound stable  - daily xeroform changes to face - may performed by RN  - continue abx   - few remaining sutures to remain in place for time being  - recommend wound care consult for sacral ulcer     Plastic Surgery  Spectra [phone]

## 2022-11-28 NOTE — CONSULT NOTE ADULT - CONSULT REASON
Wound evaluation s/p surgical excision of SCC with local flap reconstruction 1 week ago
R distal clavicle fracture
covid
s/p fall ?HT ?LOC -AC
fall
Respiratory monitoring in the setting of multiple rib fractures and frailty score of 4
Hypoxemia
L2-L4 compression fractures
Goals of care and Symptom Management

## 2022-11-28 NOTE — CONSULT NOTE ADULT - PROBLEM SELECTOR RECOMMENDATION 3
Vascular dementia, advanced stage  Hospice consult/CMO Vascular dementia, advanced stage  Hospice consult/CMO    Recommend non-pharmacological interventions to prevent/treat delirium  - maintain day/night light cycles  - optimize sleep-wake cycle, minimize environmental noise  - reorientation frequently  - use verbal redirection as first line  - minimize restraints and lines  - ensure good bladder/bowel function  - ensure adequate pain control

## 2022-11-29 NOTE — PROGRESS NOTE ADULT - ASSESSMENT
89yM w/ PMHx of Squamous cell carcinoma s/p resection and local flap with Dr. Doe 11/14/22, CAD s/p CABG 25 years ago, Afib and sick sinus syndrome s/p biventricular pacemaker and ICD, HTN, CKD3, GERD, HFrEF presented s/p unwitnessed fall at nursing home.  HO Chronic kidney diseaset was admitted under SICU - today he is in the CEU .    S/P Fall , Multiple skeletal injuries 2/2 fall, rib fx, clavicular fx,  age indeterminate compression fracture of L2-L4 vertebral bodies  Acute hypoxemic respiratory failure on 3 L NC improving/  b/l pleural effusions  COVID + ( was positive in June as well)   s/p SCC resection from left cheek with local flap coverage for reconstruction  Macrocytic anemia   Hx of frequent falls  HFrEF EF 32%  was 35-49 in May 2022  -Hx of  Sick sinus syndrome s/p Biv ICD  hx of chronic  AFib not on AC bcz of falls   hx of Lymphoma   Hx of dementia   Hx of  CKD 3  Elevated troponin - likely demands stable     the patient now is CMO only status   Palliative team on board   implement palliative team recommendations   EP input appreciated   if the patient would take some home meds - increased synthroid to 50mcg discussed wit palliative the patient can take his home meds as tolerated   Hospice note appreciated- Discharge Thursday to  [facility]. Please coordinate D/C and transport for 8:30 AM on Thursday.     I discussed with the patient daughter in details at bedside

## 2022-11-29 NOTE — PROGRESS NOTE ADULT - SUBJECTIVE AND OBJECTIVE BOX
HPI:  TRAUMA ACTIVATION LEVEL: CONSULT  ACTIVATED BY: ED  INTUBATED: NO      MECHANISM OF INJURY:   [] Blunt     [] MVC	  [x] Fall	  [] Pedestrian Struck	  [] Motorcycle     [] Assault     [] Bicycle collision    [] Sports injury    [] Penetrating    [] Gun Shot Wound      [] Stab Wound    GCS: 14 	E: 4	V: 5	M: 5    HPI:    89yM w/ PMHx of Squamous cell carcinoma s/p resection and local flap with Dr. Doe 11/14/22, CAD s/p CABG 25 years ago, Afib and sick sinus syndrome s/p biventricular pacemaker and ICD, HTN, CKD3, GERD, HFrEF, lymphoma being followed by patient's oncologist seen as Trauma consult s/p unwitnessed fall at nursing home .  Trauma assessment in ED: ABCs intact , GCS 14 , AAOx0. Patient brought in by EMS, daughter at bedside to give history. Patient has history of frequent falls and is not on anticoagulation for that reason. She states his  baseline mental status is AAOx1-2, but he seemed more confused to her today. CTH negative, CT-Cspine negative. CT chest/abdomen/pelvis revealing R acromion fracture seen on CT vs. distal clavicular fracture on XR with associated 4x3 cm hematoma, b/l moderate-large pleural effusions, acute nondisplaced fractures of b/l 11-12 posterior ribs, age-indeterminate right 5-6 lateral rib fractures and left 10-11 lateral rib fractures, age-indeterminate L2-L4 compression deformities.   Non-traumatic injuries identified: heterogenous b/l renal cortical soft tissue masses suspicious of neoplasm, thickening of left anteriolateral bladder wall, cholelithiasis, fat-containing left inguinal hernia. (22 Nov 2022 14:42)     INTERVAL EVENTS:  11/29:     ADVANCE DIRECTIVES:    X DNR /DNI/CMO MOLST  [x ]  Living Will  [x ]   DECISION MAKER(s):  [ x] Health Care Proxy(s)  [ ] Surrogate(s)  [ ] Guardian           Name(s): Phone Number(s):  Name(s): Phone Number(s):  Answers: Emergency Contact Name Marcie Leslie,  Answers: Emergency Contact Phone # [phone],  Answers: Emergency Contact Relationship dtr  BASELINE (I)ADL(s) (prior to admission):  [place]: [ ]Total  [ ] Moderate [x ]Dependent  Allergies    No Known Allergies    Intolerances    MEDICATIONS  (STANDING):  acetaminophen     Tablet .. 650 milliGRAM(s) Oral every 6 hours  cephalexin 500 milliGRAM(s) Oral every 12 hours  cyanocobalamin 1000 MICROGram(s) Oral daily  divalproex  milliGRAM(s) Oral every 12 hours  divalproex  milliGRAM(s) Oral at bedtime  gabapentin 100 milliGRAM(s) Oral three times a day  levothyroxine 50 MICROGram(s) Oral daily  lidocaine   4% Patch 1 Patch Transdermal daily  melatonin 5 milliGRAM(s) Oral at bedtime  memantine 5 milliGRAM(s) Oral daily  ofloxacin 0.3% Solution 1 Drop(s) Both Ears three times a day  pantoprazole    Tablet 40 milliGRAM(s) Oral before breakfast  senna 2 Tablet(s) Oral at bedtime    MEDICATIONS  (PRN):  LORazepam   Injectable 0.25 milliGRAM(s) IV Push every 4 hours PRN anxiety/agitation  morphine Concentrate 5 milliGRAM(s) SubLingual every 4 hours PRN pain or dyspnea    PRESENT SYMPTOMS: [ ]Unable to obtain due to poor mentation   Source if other than patient:  [ ]Family   [ ]Team     Pain: [ ]yes [ ]no  QOL impact -   Location -                    Aggravating factors -  Quality -  Radiation -  Timing-  Severity (0-10 scale):  Minimal acceptable level (0-10 scale):     CPOT:    https://www.[facility].org/getattachment/[email or web address]-[email or web address]/Critical-Care-Pain-Observation-Tool-(CPOT)      PAIN AD Score:     http://geriatrictoolkit.[email or web address]/painad.pdf (press ctrl +  left click to view)    Dyspnea:                           [ ]Mild [ ]Moderate [ ]Severe  Anxiety:                             [ ]Mild [ ]Moderate [ ]Severe  Fatigue:                             [ ]Mild [ ]Moderate [ ]Severe  Nausea:                             [ ]Mild [ ]Moderate [ ]Severe  Loss of appetite:              [ ]Mild [ ]Moderate [ ]Severe  Constipation:                    [ ]Mild [ ]Moderate [ ]Severe    Other Symptoms:  [ ]All other review of systems negative     Palliative Performance Status Version 2:         %    http://npcrc.org/files/news/palliative_performance_scale_ppsv2.pdf  PHYSICAL EXAM:  Vital Signs Last 24 Hrs  T(C): 37 (29 Nov 2022 05:58), Max: 37 (29 Nov 2022 05:58)  T(F): 98.6 (29 Nov 2022 05:58), Max: 98.6 (29 Nov 2022 05:58)  HR: 73 (29 Nov 2022 05:58) (73 - 73)  BP: 140/67 (29 Nov 2022 05:58) (140/67 - 140/67)  BP(mean): --  RR: --  SpO2: --     I&O's Summary    28 Nov 2022 07:01  -  29 Nov 2022 07:00  --------------------------------------------------------  IN: 0 mL / OUT: 500 mL / NET: -500 mL  GENERAL:  [ x] No acute distress [ ]Lethargic  [ ]Unarousable  [ ]Verbal  [ ]Non-Verbal [ ]Cachexia    BEHAVIORAL/PSYCH:  [x ]Alert and Oriented x  1[ ] Anxiety [ ] Delirium [ ] Agitation [ x] Calm   EYES: [x ] No scleral icterus [ ] Scleral icterus [ ] Closed  ENMT:  [ ]Dry mouth  [ x]No external oral lesions [ ] No external ear or nose lesions  CARDIOVASCULAR:  [ ]Regular [ ]Irregular [ ]Tachy [ ]Not Tachy  [ ]Jose Luis [ ] Edema [ x] No edema  PULMONARY:  [ ]Tachypnea  [ ]Audible excessive secretions [ ] No labored breathing [x ] labored breathing  GASTROINTESTINAL: [x ]Soft  [ ]Distended  [ ]Not distended [ ]Non tender [ ]Tender  MUSCULOSKELETAL: [x ]No clubbing [ ] clubbing  [ x] No cyanosis [ ] cyanosis  NEUROLOGIC: [ ]No focal deficits  [ ]Follows commands  [ ]Does not follow commands  [ x]Cognitive impairment  [ ]Dysphagia  [ x]Dysarthria  [ ]Paresis   SKIN: [ ] Jaundiced [ ] Non-jaundiced [ ]Rash [x ]No Rash [ ] Warm [ ] Dry  MISC/LINES: [ ] ET tube [ ] Trach [ ]NGT/OGT [ ]PEG [ x]Garcia  LABS:                        9.2    6.50  )-----------( 158      ( 28 Nov 2022 00:24 )             29.1   11-28    143  |  105  |  32<H>  ----------------------------<  104<H>  4.8   |  31  |  1.4    Ca    8.2<L>      28 Nov 2022 00:24  Mg     2.1     11-28    TPro  5.6<L>  /  Alb  2.3<L>  /  TBili  1.0  /  DBili  x   /  AST  8   /  ALT  5   /  AlkPhos  74  11-28        RADIOLOGY & ADDITIONAL STUDIES:        REFERRALS:   [ ]Chaplaincy  [x ]Hospice  [ ]Child Life  [x ]Social Work  [x ]Case management [ ]Holistic Therapy     Goals of Care Document:

## 2022-11-29 NOTE — PROGRESS NOTE ADULT - ASSESSMENT
89yMale being evaluated for goals of care and symptom management. Pt w h/o SCC of face s/p procedure - see plastic surgery notes. Pt otherwise s/p fall in SNF. Pt has advanced dementia. Assist team w goals of care and symptom management. See previous [facility] notes w daughter.     Pt with complaint of pain, denies dyspnea.       MEDD : Roxanol 5mg x 1     See Recs below.    Please call [phone] with questions or concerns 24/7.   We will continue to follow.

## 2022-11-29 NOTE — HOSPICE CARE NOTE - CONVESATION DETAILS
Family requesting transfer to [facility]. Please coordinate D/C and transport for 8:30 AM on Thursday.

## 2022-11-30 NOTE — CHART NOTE - NSCHARTNOTEFT_GEN_A_CORE
PALLIATIVE MEDICINE INTERDISCIPLINARY TEAM NOTE    Provider:  [ x  ]Social Work   [   ]          [ x  ] Initial visit [   ] Follow up    Family or contact name / phone #   Met with: [ x  ] Patient :  patient was observed to be resting comfortably[   ] Family  [   ] Other:    Primary Language: [   ] English [   ] Other*:                      *Interpretation provided by:    SUPPORT DIAGNOSES            (Check all that apply)  [   ] Psychosocial spiritual assessment (PSSA)  [   x] EOL issues  [   ] Cultural / spiritual concerns  [ x  ] Pain / suffering  [   ] Dementia / AMS  [   ] Other:  [   ] AD issues  [   ] Grief / loss / sadness  [   ] Discharge issues  [  x ] Distress / coping    PSYCHOSOCIAL ASSESSMENT OF PATIENT         (Check all that apply)  [  x ] Initial Assessment            [   ] Reassessment          [   ] Not Applicable this visit    Pain/suffering acuity:  patient appeared to be comfortable  [   ] None to mild (0-3)           [   ] Moderate (4-6)        [   ] High (7-10)    Mental Status:  [   ] Alert/oriented (x3)          [   ] Confused/Altered(x2/x1)         [  x ] Non-resp    Functional status:  [   ] Independent w ADLs      [   ] Needs Assistance             [  x ] Bedbound/Full Care    Coping:  unable to assess  [   ] Coping well                     [   ] Coping w/difficulty            [   ] Poor coping    Support system: unable to assess  [   ] Strong                              [   ] Adequate                        [   ] Inadequate      Past history and medications for:     [ ] Anxiety       [ ] Depression    [ ] Sleep disorders     SPIRITUAL ASSESSMENT  Christianity/Spiritual practice: ___________________________    Role of organized Pentecostal:  [   ] Important                     [   ] Some (fam tradition, cultural)               [   ] None    Effects on medical care:  [   ] Yes, _____________________________________                         [   ] None    Cultural/Yazidism need:  [   ] Yes, _____________________________________                         [   ] None    Refer to Pastoral Care:  [   ] Yes           [   ] No, not at this time    SERVICE PROVIDED  [   ]PSSA                                                                             [   ]Discharge support / facilitation  [   ]AD / goals of care counseling                                  [   ]EOL / death / bereavement counseling  [   ]Counseling / support                                                [   ] Family meeting  [   ]Prayer / sacrament / ritual                                      [   ] Referral   [   ]Other                                                                       NOTE and Plan of Care (PoC):    Patient is a 89 year old male.  Patient was admitted on 11/22/22, dx:  multiple fx of ribs, fx of acromion of scapula, pleural effusion.  Patient has PMH of squamous cell carcinoma s/p resection and local flap with Dr. Blkaely 11/14/22, CAD s/p CABG 25 years ago, A-Fib, sick sinus syndrome s/p biventricular pacemaker and ICD, HTN, CKD3, GERD.  Presented to ED s/p unwitnessed fall.    Patient was observed to be resting comfortably.  Patient is scheduled to be discharged tomorrow to [facility].

## 2022-11-30 NOTE — DISCHARGE NOTE PROVIDER - HOSPITAL COURSE
89yM w/ PMHx of Squamous cell carcinoma s/p resection and local flap with Dr. Doe 11/14/22, CAD s/p CABG 25 years ago, Afib and sick sinus syndrome s/p biventricular pacemaker and ICD, HTN, CKD3, GERD, HFrEF, lymphoma being followed by patient's oncologist seen as Trauma consult s/p unwitnessed fall at nursing home .  Trauma assessment in ED: ABCs intact , GCS 14 , AAOx0. Patient brought in by EMS, daughter at bedside to give history. Patient has history of frequent falls and is not on anticoagulation for that reason. She states his  baseline mental status is AAOx1-2, but he seemed more confused to her today. CTH negative, CT-Cspine negative. CT chest/abdomen/pelvis revealing R acromion fracture seen on CT vs. distal clavicular fracture on XR with associated 4x3 cm hematoma, b/l moderate-large pleural effusions, acute nondisplaced fractures of b/l 11-12 posterior ribs, age-indeterminate right 5-6 lateral rib fractures and left 10-11 lateral rib fractures, age-indeterminate L2-L4 compression deformities.   Non-traumatic injuries identified: heterogenous b/l renal cortical soft tissue masses suspicious of neoplasm, thickening of left anteriolateral bladder wall, cholelithiasis, fat-containing left inguinal hernia.  Patient was transferred to general medical floor where he was seen by palliative and per family discussion made cmo with plans to dc to hospice.     Stable for dc to hospice at this time as planned.

## 2022-11-30 NOTE — CHART NOTE - NSCHARTNOTEFT_GEN_A_CORE
Plan for [facility] today. Spoke to hospice. Sent message to .     Pt is DNR/DNI/CMO. Call x [phone] for any questions or concerns for palliative care team Plan for [facility] Thursday am.  Spoke to hospice. Sent message to .     Pt is DNR/DNI/CMO. Call x [phone] for any questions or concerns for palliative care team

## 2022-11-30 NOTE — PROGRESS NOTE ADULT - SUBJECTIVE AND OBJECTIVE BOX
ARLEEN IDRIS  89y Male    Patient is a 89y old  Male who presents with a chief complaint of fall    INTERVAL HPI/OVERNIGHT EVENTS:    ROS: Pt denies fever, chills, SOB, nausea, vomiting, abdominal pain, muscle or joint pain. Pt is seen and examined at bedside.    Overnight events: No acute events overnight.    Vital Signs Last 24 Hrs  T(C): 35.9 (30 Nov 2022 04:50), Max: 35.9 (30 Nov 2022 04:50)  T(F): 96.7 (30 Nov 2022 04:50), Max: 96.7 (30 Nov 2022 04:50)  HR: 70 (30 Nov 2022 04:50) (70 - 70)  BP: 131/65 (30 Nov 2022 04:50) (103/56 - 131/65)  BP(mean): --  RR: 18 (30 Nov 2022 04:50) (18 - 18)  SpO2: --    No Known Allergies    MEDICATIONS  (STANDING):  acetaminophen     Tablet .. 650 milliGRAM(s) Oral every 6 hours  cyanocobalamin 1000 MICROGram(s) Oral daily  divalproex  milliGRAM(s) Oral every 12 hours  divalproex  milliGRAM(s) Oral at bedtime  gabapentin 100 milliGRAM(s) Oral three times a day  levothyroxine 50 MICROGram(s) Oral daily  lidocaine   4% Patch 1 Patch Transdermal daily  LORazepam    Concentrate 0.5 milliGRAM(s) SubLingual every 6 hours  melatonin 5 milliGRAM(s) Oral at bedtime  memantine 5 milliGRAM(s) Oral daily  pantoprazole    Tablet 40 milliGRAM(s) Oral before breakfast  senna 2 Tablet(s) Oral at bedtime    MEDICATIONS  (PRN):  morphine Concentrate 5 milliGRAM(s) SubLingual every 4 hours PRN pain or dyspnea      PHYSICAL EXAM:  General Appearance: NAD, normal for age and gender. 	  Neck: Normal JVP, no bruit.   Eyes: Conjunctiva clear, Extra Ocular muscles intact. No scleral icterus.  ENMT: Moist oral mucosa. No oral lesion.  Cardiovascular: Irregularly irregular, S1 S2, No JVD, systolic murmurs.  Respiratory: Lungs clear to auscultation. No wheezes, rales or rhonchi.  Psychiatry: Alert and oriented x 1# Ly, Calm.  Gastrointestinal:  Soft, Non-tender, Non-distended.  Neurologic: Non-focal deficits.  Musculoskeletal/ extremities: No joint swelling. No clubbing, cyanosis or edema.  Vascular: Peripheral pulses palpable bilaterally.  Skin/Integumen: No rashes, No ecchymoses, No cyanosis.    LABS:  No new labs.        RADIOLOGY & ADDITIONAL TESTS:

## 2022-11-30 NOTE — PROGRESS NOTE ADULT - PROBLEM SELECTOR PLAN 6
-lorazepam 0.25mg IV q4h PRN for anxiety/agitation.
-lorazepam 0.25mg IV q4h PRN for anxiety/agitation.

## 2022-11-30 NOTE — PROGRESS NOTE ADULT - ASSESSMENT
89yM w/ PMHx of Squamous cell carcinoma s/p resection and local flap with Dr. Doe 11/14/22, CAD s/p CABG 25 years ago, Afib and sick sinus syndrome s/p biventricular pacemaker and ICD, HTN, CKD3, GERD, HFrEF presented s/p unwitnessed fall at nursing home.  HO Chronic kidney diseaset was admitted under SICU - today he is in the CEU .    S/P Fall , Multiple skeletal injuries 2/2 fall, rib fx, clavicular fx,  age indeterminate compression fracture of L2-L4 vertebral bodies  Acute hypoxemic respiratory failure on 3 L NC improving/  b/l pleural effusions  COVID + ( was positive in June as well)   s/p SCC resection from left cheek with local flap coverage for reconstruction  Macrocytic anemia   Hx of frequent falls  HFrEF EF 32%  was 35-49 in May 2022  -Hx of  Sick sinus syndrome s/p Biv ICD  hx of chronic  AFib not on AC bcz of falls   hx of Lymphoma   Hx of dementia   Hx of  CKD 3  Elevated troponin - likely demands stable     the patient now is CMO only status   Palliative team on board   implement palliative team recommendations   EP input appreciated   if the patient would take some home meds - increased synthroid to 50mcg discussed wit palliative the patient can take his home meds as tolerated   plan for discharge to [facility] tomorrow 8:30 AM                     89yM w/ PMHx of Squamous cell carcinoma s/p resection and local flap with Dr. Doe 11/14/22, CAD s/p CABG 25 years ago, Afib and sick sinus syndrome s/p biventricular pacemaker and ICD, HTN, CKD3, GERD, HFrEF presented s/p unwitnessed fall at nursing home.  HO Chronic kidney diseaset was admitted under SICU - today he is in the CEU .    # Mechanical fall associated with multiple skeletal injuries 2/2 fall, rib fx, clavicular fx,  age indeterminate compression fracture of L2-L4 vertebral bodies, h/o frequent falls  # Acute hypoxemic respiratory failure on 3 L NC improving,  b/l pleural effusions  # COVID-19 positive ( was positive in June as well)   # SCC of left check s/p resection from left cheek with local flap coverage for reconstruction  # Macrocytic anemia   # HFrEF EF 32%  was 35-49 in May 2022  # h/o Sick sinus syndrome s/p BiV-ICD  # Chronic Afib not on AC  # h/o Lymphoma   # h/o Dementia   # CKD Stage 3, previously stable  # Elevated troponin likely demand ischemia  # Hypothyroidism  - no labs  - pt is comfort measures only  - follow up palliative  - on roxanol and ativan for pain control and anxiety management as per palliative  - plan for discharge to [facility] tomorrow 8:30 AM, discharge prepared                     89yM w/ PMHx of Squamous cell carcinoma s/p resection and local flap with Dr. Doe 11/14/22, CAD s/p CABG 25 years ago, Afib and sick sinus syndrome s/p biventricular pacemaker and ICD, HTN, CKD3, GERD, HFrEF presented s/p unwitnessed fall at nursing home.  HO Chronic kidney diseaset was admitted under SICU - today he is in the CEU .    # Mechanical fall associated with multiple skeletal injuries 2/2 fall, rib fx, clavicular fx,  age indeterminate compression fracture of L2-L4 vertebral bodies, h/o frequent falls  # Acute hypoxemic respiratory failure on 3 L NC improving,  b/l pleural effusions  # COVID-19 positive ( was positive in June as well)   # SCC of left check s/p resection from left cheek with local flap coverage for reconstruction  # Macrocytic anemia   # HFrEF EF 32%  was 35-49 in May 2022  # h/o Sick sinus syndrome s/p BiV-ICD  # Chronic Afib not on AC  # h/o Lymphoma   # h/o Dementia   # CKD Stage 3, previously stable  # Elevated troponin likely demand ischemia  # Hypothyroidism  - no labs  - pt is comfort measures only, DNR/DNI  - follow up palliative  - on roxanol and ativan for pain control and anxiety management as per palliative  - ativan switch to po today  - plan for discharge to [facility] tomorrow 8:30 AM, discharge prepared  - ICD deactivated 11/28  - c/w levothyroxine 50 mcg daily for hypothyroidism

## 2022-11-30 NOTE — DISCHARGE NOTE PROVIDER - NSDCQMCOGNITION_NEU_ALL_CORE
Difficulty concentrating/Difficulty remembering Difficulty concentrating/Difficulty making decisions/Difficulty remembering

## 2022-11-30 NOTE — PROGRESS NOTE ADULT - PROBLEM SELECTOR PLAN 3
Vascular dementia, advanced stage  Hospice consult/CMO  Recommend non-pharmacological interventions to prevent/treat delirium  - maintain day/night light cycles  - optimize sleep-wake cycle, minimize environmental noise  - reorientation frequently  - use verbal redirection as first line  - minimize restraints and lines  - ensure good bladder/bowel function  - ensure adequate pain control.
Vascular dementia, advanced stage  Hospice consult/CMO  Recommend non-pharmacological interventions to prevent/treat delirium  - maintain day/night light cycles  - optimize sleep-wake cycle, minimize environmental noise  - reorientation frequently  - use verbal redirection as first line  - minimize restraints and lines  - ensure good bladder/bowel function  - ensure adequate pain control.

## 2022-11-30 NOTE — PROGRESS NOTE ADULT - PROVIDER SPECIALTY LIST ADULT
Hospitalist
Hospitalist
Plastic Surgery
SICU
Electrophysiology
Hospitalist
Infectious Disease
Internal Medicine
Orthopedics
Plastic Surgery
Plastic Surgery
Trauma Surgery
Internal Medicine
Pulmonology
SICU
Plastic Surgery
Pulmonology
Palliative Care
Palliative Care

## 2022-11-30 NOTE — PROGRESS NOTE ADULT - PROBLEM SELECTOR PLAN 1
Pt with squamous cell cancer, advanced illness - no disease directed care.  -comfort measures only  -Hospice consult completed - following
Pt with squamous cell cancer, advanced illness - no disease directed care.  -comfort measures only  -Hospice consult completed - following.

## 2022-11-30 NOTE — PROGRESS NOTE ADULT - SUBJECTIVE AND OBJECTIVE BOX
TRAUMA ACTIVATION LEVEL: CONSULT  ACTIVATED BY: ED  INTUBATED: NO      MECHANISM OF INJURY:   [] Blunt     [] MVC	  [x] Fall	  [] Pedestrian Struck	  [] Motorcycle     [] Assault     [] Bicycle collision    [] Sports injury    [] Penetrating    [] Gun Shot Wound      [] Stab Wound    GCS: 14 	E: 4	V: 5	M: 5    HPI:    89yM w/ PMHx of Squamous cell carcinoma s/p resection and local flap with Dr. Doe 11/14/22, CAD s/p CABG 25 years ago, Afib and sick sinus syndrome s/p biventricular pacemaker and ICD, HTN, CKD3, GERD, HFrEF, lymphoma being followed by patient's oncologist seen as Trauma consult s/p unwitnessed fall at nursing home .  Trauma assessment in ED: ABCs intact , GCS 14 , AAOx0. Patient brought in by EMS, daughter at bedside to give history. Patient has history of frequent falls and is not on anticoagulation for that reason. She states his  baseline mental status is AAOx1-2, but he seemed more confused to her today. CTH negative, CT-Cspine negative. CT chest/abdomen/pelvis revealing R acromion fracture seen on CT vs. distal clavicular fracture on XR with associated 4x3 cm hematoma, b/l moderate-large pleural effusions, acute nondisplaced fractures of b/l 11-12 posterior ribs, age-indeterminate right 5-6 lateral rib fractures and left 10-11 lateral rib fractures, age-indeterminate L2-L4 compression deformities.   Non-traumatic injuries identified: heterogenous b/l renal cortical soft tissue masses suspicious of neoplasm, thickening of left anteriolateral bladder wall, cholelithiasis, fat-containing left inguinal hernia. (22 Nov 2022 14:42)     INTERVAL EVENTS:  11/30 -   ADVANCE DIRECTIVES:    DNR /DNI/CMO MOLST  [x ]  Living Will  [ ]   DECISION MAKER(s):  [ x] Health Care Proxy(s)  [ ] Surrogate(s)  [ ] Guardian           Name(s): Phone Number(s):  Answers: Emergency Contact Name Marcie Leslie,  Answers: Emergency Contact Phone # [phone],  Answers: Emergency Contact Relationship daughter    BASELINE (I)ADL(s) (prior to admission):  [place]: [ ]Total  [ ] Moderate [ ]Dependent  Palliative Performance Status Version 2:         %    http://npcrc.org/files/news/palliative_performance_scale_ppsv2.pdf    Allergies    No Known Allergies    Intolerances    MEDICATIONS  (STANDING):  acetaminophen     Tablet .. 650 milliGRAM(s) Oral every 6 hours  cyanocobalamin 1000 MICROGram(s) Oral daily  divalproex  milliGRAM(s) Oral every 12 hours  divalproex  milliGRAM(s) Oral at bedtime  gabapentin 100 milliGRAM(s) Oral three times a day  levothyroxine 50 MICROGram(s) Oral daily  lidocaine   4% Patch 1 Patch Transdermal daily  melatonin 5 milliGRAM(s) Oral at bedtime  memantine 5 milliGRAM(s) Oral daily  ofloxacin 0.3% Solution 1 Drop(s) Both Ears three times a day  pantoprazole    Tablet 40 milliGRAM(s) Oral before breakfast  senna 2 Tablet(s) Oral at bedtime    MEDICATIONS  (PRN):  LORazepam   Injectable 0.25 milliGRAM(s) IV Push every 4 hours PRN anxiety/agitation  morphine Concentrate 5 milliGRAM(s) SubLingual every 4 hours PRN pain or dyspnea    PRESENT SYMPTOMS: [ ]Unable to obtain due to poor mentation   Source if other than patient:  [ ]Family   [ ]Team     Pain: [ ]yes [ ]no  QOL impact -   Location -                    Aggravating factors -  Quality -  Radiation -  Timing-  Severity (0-10 scale):  Minimal acceptable level (0-10 scale):     CPOT:    https://www.[email or web address]/getattachment/sge70w36-0v0d-[email or web address]-4523w6487c0f/Critical-Care-Pain-Observation-Tool-(CPOT)      PAIN AD Score:     http://geriatrictoolkit.[email or web address]/cog/painad.pdf (press ctrl +  left click to view)    Dyspnea:                           [ ]Mild [ ]Moderate [ ]Severe  Anxiety:                             [ ]Mild [ ]Moderate [ ]Severe  Fatigue:                             [ ]Mild [ ]Moderate [ ]Severe  Nausea:                             [ ]Mild [ ]Moderate [ ]Severe  Loss of appetite:              [ ]Mild [ ]Moderate [ ]Severe  Constipation:                    [ ]Mild [ ]Moderate [ ]Severe    Other Symptoms:  [ ]All other review of systems negative     Palliative Performance Status Version 2:         %    http://[facility].org/files/news/palliative_performance_scale_ppsv2.pdf  PHYSICAL EXAM:  Vital Signs Last 24 Hrs  T(C): 35.9 (30 Nov 2022 04:50), Max: 35.9 (30 Nov 2022 04:50)  T(F): 96.7 (30 Nov 2022 04:50), Max: 96.7 (30 Nov 2022 04:50)  HR: 70 (30 Nov 2022 04:50) (70 - 70)  BP: 131/65 (30 Nov 2022 04:50) (102/56 - 131/65)  BP(mean): --  RR: 18 (30 Nov 2022 04:50) (18 - 18)  SpO2: --     I&O's Summary    29 Nov 2022 07:01  -  30 Nov 2022 07:00  --------------------------------------------------------  IN: 0 mL / OUT: 100 mL / NET: -100 mL    GENERAL:  [ x] No acute distress [ ]Lethargic  [ ]Unarousable  [ ]Verbal  [ ]Non-Verbal [ ]Cachexia    BEHAVIORAL/PSYCH:  [x ]Alert and Oriented x  1[ ] Anxiety [ ] Delirium [ ] Agitation [ x] Calm   EYES: [x ] No scleral icterus [ ] Scleral icterus [ ] Closed  ENMT:  [ ]Dry mouth  [ x]No external oral lesions [ ] No external ear or nose lesions  CARDIOVASCULAR:  [ ]Regular [ ]Irregular [ ]Tachy [ ]Not Tachy  [ ]Jose Luis [ ] Edema [ x] No edema  PULMONARY:  [ ]Tachypnea  [ ]Audible excessive secretions [ ] No labored breathing [x ] labored breathing  GASTROINTESTINAL: [x ]Soft  [ ]Distended  [ ]Not distended [ ]Non tender [ ]Tender  MUSCULOSKELETAL: [x ]No clubbing [ ] clubbing  [ x] No cyanosis [ ] cyanosis  NEUROLOGIC: [ ]No focal deficits  [ ]Follows commands  [ ]Does not follow commands  [ x]Cognitive impairment  [ ]Dysphagia  [ x]Dysarthria  [ ]Paresis   SKIN: [ ] Jaundiced [ ] Non-jaundiced [ ]Rash [x ]No Rash [ ] Warm [ ] Dry  MISC/LINES: [ ] ET tube [ ] Trach [ ]NGT/OGT [ ]PEG [ x]Garcia      LABS: on CMO             RADIOLOGY & ADDITIONAL STUDIES: on CMO     REFERRALS:   [ ]Chaplaincy  [x ]Hospice  [ ]Child Life  [ ]Social Work  [ ]Case management [ ]Holistic Therapy     Goals of Care Document:

## 2022-11-30 NOTE — PROGRESS NOTE ADULT - ASSESSMENT
89yMale being evaluated for goals of care and symptom management. Pt w h/o SCC of face s/p procedure - see plastic surgery notes. Pt otherwise s/p fall in SNF. Pt has advanced dementia. Assist team w goals of care and symptom management. See previous [facility] notes w daughter.     Pt with complaint of pain, denies dyspnea. Daughter reports pt is anxious.       MEDD :     See Recs below.    Please call [phone] with questions or concerns 24/7.   We will continue to follow.

## 2022-11-30 NOTE — DISCHARGE NOTE PROVIDER - NSDCCPCAREPLAN_GEN_ALL_CORE_FT
PRINCIPAL DISCHARGE DIAGNOSIS  Diagnosis: Multiple fractures of ribs  Assessment and Plan of Treatment: You came to the hospital after sustaining a fall with multiple rib fractures for which you are currently receiving pain management for. Given you cancer history and your comorbid conditions, and upon discussion with your family regarding goals of care, You were placed on Comfort Measures for which you are receiving medications to provide comfort and pain relief without aggressive disease modifying treatments. Upon discharge you will be transferred to hospice where this care will be continued.

## 2022-11-30 NOTE — DISCHARGE NOTE PROVIDER - NSDCMRMEDTOKEN_GEN_ALL_CORE_FT
divalproex sodium 125 mg oral delayed release tablet: 1 tab(s) orally every 12 hours  divalproex sodium 250 mg oral delayed release tablet: 1 tab(s) orally once a day (at bedtime)  gabapentin 100 mg oral capsule: 1 cap(s) orally 3 times a day  levothyroxine 25 mcg (0.025 mg) oral tablet: 1 tab(s) orally once a day  lidocaine 4% topical film: Apply topically to affected area once a day  LORazepam 2 mg/mL oral concentrate: 1  orally every 4 hours, As Needed anxiety agitation   Melatonin 5 mg oral tablet: 1 tab(s) orally once a day (at bedtime)  morphine 20 mg/mL oral concentrate: 5 milligram(s) orally every 4 hours, As Needed pain   pantoprazole 40 mg oral delayed release tablet: 1 tab(s) orally once a day (before a meal)  senna leaf extract oral tablet: 2 tab(s) orally once a day (at bedtime)   acetaminophen 325 mg oral tablet: 2 tab(s) orally every 6 hours  divalproex sodium 125 mg oral delayed release tablet: 1 tab(s) orally every 12 hours  divalproex sodium 250 mg oral delayed release tablet: 1 tab(s) orally once a day (at bedtime)  gabapentin 100 mg oral capsule: 1 cap(s) orally 3 times a day  levothyroxine 50 mcg (0.05 mg) oral tablet: 1 tab(s) orally once a day  lidocaine 4% topical film: Apply topically to affected area once a day  LORazepam 0.5 mg oral tablet: 1 tab(s) orally every 4 hours, As needed, Agitation  Melatonin 5 mg oral tablet: 1 tab(s) orally once a day (at bedtime)  memantine 5 mg oral tablet: 1 tab(s) orally once a day  morphine 20 mg/mL oral concentrate: 5 milligram(s) orally every 4 hours, As Needed pain   pantoprazole 40 mg oral delayed release tablet: 1 tab(s) orally once a day (before a meal)  senna leaf extract oral tablet: 2 tab(s) orally once a day (at bedtime)

## 2022-12-01 NOTE — DISCHARGE NOTE NURSING/CASE MANAGEMENT/SOCIAL WORK - NSDCPEFALRISK_GEN_ALL_CORE
For information on Fall & Injury Prevention, visit: [email or web address] OR  [email or web address] OR  https://www.cdc.gov/steadi/patient.html

## 2022-12-01 NOTE — DISCHARGE NOTE NURSING/CASE MANAGEMENT/SOCIAL WORK - PATIENT PORTAL LINK FT
You can access the FollowMyHealth Patient Portal offered by [facility] by registering at the following website: [email or web address]. By joining [facility]’s FollowMyHealth portal, you will also be able to view your health information using other applications (apps) compatible with our system.

## 2022-12-01 NOTE — DISCHARGE NOTE NURSING/CASE MANAGEMENT/SOCIAL WORK - NSDCVIVACCINE_GEN_ALL_CORE_FT
Tdap; 08-May-2022 16:34; Moises Tristan (RN); [employer]; [id number] (Exp. Date: 11-Mar-2024); IntraMuscular; Deltoid Right.; 0.5 milliLiter(s); VIS (VIS Published: 09-May-2013, VIS Presented: 08-May-2022);   Tdap; 22-Nov-2022 10:28; Adina Alvarez (RN); [employer]; [id number] (Exp. Date: 14-Oct-2024); IntraMuscular; Deltoid Left.; 0.5 milliLiter(s); VIS (VIS Published: 09-May-2013, VIS Presented: 22-Nov-2022);

## 2022-12-07 ENCOUNTER — APPOINTMENT (OUTPATIENT)
Dept: NEUROSURGERY | Facility: CLINIC | Age: 87
End: 2022-12-07

## 2022-12-09 DIAGNOSIS — Z66 DO NOT RESUSCITATE: ICD-10-CM

## 2022-12-09 DIAGNOSIS — D69.6 THROMBOCYTOPENIA, UNSPECIFIED: ICD-10-CM

## 2022-12-09 DIAGNOSIS — S42.031A DISPLACED FRACTURE OF LATERAL END OF RIGHT CLAVICLE, INITIAL ENCOUNTER FOR CLOSED FRACTURE: ICD-10-CM

## 2022-12-09 DIAGNOSIS — I95.9 HYPOTENSION, UNSPECIFIED: ICD-10-CM

## 2022-12-09 DIAGNOSIS — K80.20 CALCULUS OF GALLBLADDER WITHOUT CHOLECYSTITIS WITHOUT OBSTRUCTION: ICD-10-CM

## 2022-12-09 DIAGNOSIS — K40.90 UNILATERAL INGUINAL HERNIA, WITHOUT OBSTRUCTION OR GANGRENE, NOT SPECIFIED AS RECURRENT: ICD-10-CM

## 2022-12-09 DIAGNOSIS — J96.01 ACUTE RESPIRATORY FAILURE WITH HYPOXIA: ICD-10-CM

## 2022-12-09 DIAGNOSIS — S40.011A CONTUSION OF RIGHT SHOULDER, INITIAL ENCOUNTER: ICD-10-CM

## 2022-12-09 DIAGNOSIS — I24.8 OTHER FORMS OF ACUTE ISCHEMIC HEART DISEASE: ICD-10-CM

## 2022-12-09 DIAGNOSIS — C85.90 NON-HODGKIN LYMPHOMA, UNSPECIFIED, UNSPECIFIED SITE: ICD-10-CM

## 2022-12-09 DIAGNOSIS — I13.0 HYPERTENSIVE HEART AND CHRONIC KIDNEY DISEASE WITH HEART FAILURE AND STAGE 1 THROUGH STAGE 4 CHRONIC KIDNEY DISEASE, OR UNSPECIFIED CHRONIC KIDNEY DISEASE: ICD-10-CM

## 2022-12-09 DIAGNOSIS — N18.30 CHRONIC KIDNEY DISEASE, STAGE 3 UNSPECIFIED: ICD-10-CM

## 2022-12-09 DIAGNOSIS — U07.1 COVID-19: ICD-10-CM

## 2022-12-09 DIAGNOSIS — K21.9 GASTRO-ESOPHAGEAL REFLUX DISEASE WITHOUT ESOPHAGITIS: ICD-10-CM

## 2022-12-09 DIAGNOSIS — D53.9 NUTRITIONAL ANEMIA, UNSPECIFIED: ICD-10-CM

## 2022-12-09 DIAGNOSIS — I49.5 SICK SINUS SYNDROME: ICD-10-CM

## 2022-12-09 DIAGNOSIS — S80.212A ABRASION, LEFT KNEE, INITIAL ENCOUNTER: ICD-10-CM
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
## 2022-12-09 DIAGNOSIS — I48.91 UNSPECIFIED ATRIAL FIBRILLATION: ICD-10-CM

## 2022-12-09 DIAGNOSIS — S32.040A WEDGE COMPRESSION FRACTURE OF FOURTH LUMBAR VERTEBRA, INITIAL ENCOUNTER FOR CLOSED FRACTURE: ICD-10-CM

## 2022-12-09 DIAGNOSIS — I50.22 CHRONIC SYSTOLIC (CONGESTIVE) HEART FAILURE: ICD-10-CM

## 2022-12-09 DIAGNOSIS — E03.9 HYPOTHYROIDISM, UNSPECIFIED: ICD-10-CM

## 2022-12-09 DIAGNOSIS — I25.10 ATHEROSCLEROTIC HEART DISEASE OF NATIVE CORONARY ARTERY WITHOUT ANGINA PECTORIS: ICD-10-CM

## 2022-12-09 DIAGNOSIS — I27.20 PULMONARY HYPERTENSION, UNSPECIFIED: ICD-10-CM

## 2022-12-09 DIAGNOSIS — S50.812A ABRASION OF LEFT FOREARM, INITIAL ENCOUNTER: ICD-10-CM

## 2022-12-09 DIAGNOSIS — S22.43XA MULTIPLE FRACTURES OF RIBS, BILATERAL, INITIAL ENCOUNTER FOR CLOSED FRACTURE: ICD-10-CM

## 2022-12-09 DIAGNOSIS — S32.020A WEDGE COMPRESSION FRACTURE OF SECOND LUMBAR VERTEBRA, INITIAL ENCOUNTER FOR CLOSED FRACTURE: ICD-10-CM

## 2022-12-09 DIAGNOSIS — F01.C0 VASCULAR DEMENTIA, SEVERE, WITHOUT BEHAVIORAL DISTURBANCE, PSYCHOTIC DISTURBANCE, MOOD DISTURBANCE, AND ANXIETY: ICD-10-CM

## 2022-12-09 DIAGNOSIS — H10.9 UNSPECIFIED CONJUNCTIVITIS: ICD-10-CM

## 2022-12-09 DIAGNOSIS — S32.030A WEDGE COMPRESSION FRACTURE OF THIRD LUMBAR VERTEBRA, INITIAL ENCOUNTER FOR CLOSED FRACTURE: ICD-10-CM

## 2022-12-09 DIAGNOSIS — F03.90 UNSPECIFIED DEMENTIA WITHOUT BEHAVIORAL DISTURBANCE: ICD-10-CM

## 2023-01-16 ENCOUNTER — FORM ENCOUNTER (OUTPATIENT)
Age: 88
End: 2023-01-16

## 2023-01-17 ENCOUNTER — APPOINTMENT (OUTPATIENT)
Dept: CARDIOLOGY | Facility: CLINIC | Age: 88
End: 2023-01-17

## 2023-02-08 ENCOUNTER — APPOINTMENT (OUTPATIENT)
Dept: CARDIOLOGY | Facility: CLINIC | Age: 88
End: 2023-02-08

## 2023-03-08 ENCOUNTER — RX RENEWAL (OUTPATIENT)
Age: 88
End: 2023-03-08

## 2024-02-08 ENCOUNTER — RX RENEWAL (OUTPATIENT)
Age: 89
End: 2024-02-08

## 2024-02-08 RX ORDER — AMIODARONE HYDROCHLORIDE 100 MG/1
100 TABLET ORAL
Qty: 90 | Refills: 3 | Status: ACTIVE | COMMUNITY
Start: 2021-05-25 | End: 1900-01-01

## 2024-07-15 NOTE — CONSULT NOTE ADULT - SUBJECTIVE AND OBJECTIVE BOX
Increase Lexapro to 20 mg daily    Prednisone 20 mg daily for 4 days with future attacks of wrist pain or viral respiratory infection    CT scan of sinuses if wishes    ENT referral if CT scan abnormal    Rheumatology referral    Consider hydroxychloroquine    Consider meloxicam and Flonase    MyChart information sent regarding viral to bacterial sinus infection    Sleep study negative    MyChart communication sent   Orthopaedics Consult Note    IDRIS ARLEEN  [id number]    Patient is a 89y year old Male with extensive PMH listed below presented to ED after a fall in his nursing home today. Imaging demonstrated age indeterminate fxs of the right 5-6 ribs, left 10-11 ribs, L2-4 compressions fxs, and right distal clavicle fracture. orthopedics consulted for management of distal clavicle fx. Patient moving right upper extremity. Denies pain. Denies head strike. Denies numbness/tingling.    PMH/PSH  MULTIPLE FRACTURES OF RIBS; FRACTURE OF ACROMION OF SCAPULA; PLEURAL EFFUS    ^MULTIPLE FRACTURES OF RIBS; FRACTURE OF ACROMION OF SCAPULA; PLEURAL EFFUS    Yes    MEWS Score    Heart disease    Hypertension    Skin cancer    Presence of automatic cardioverter/defibrillator (AICD)    Multiple fractures of ribs    History of open heart surgery    FHx: skin cancer    FALL    90+    Pleural effusion    Renal mass    Fracture of acromion of scapula    S/P skin biopsy    [email or web address]        Medications  acetaminophen     Tablet .. 650 milliGRAM(s) Oral every 6 hours  aMIOdarone    Tablet 100 milliGRAM(s) Oral daily  cephalexin 500 milliGRAM(s) Oral every 12 hours  cyanocobalamin 1000 MICROGram(s) Oral daily  divalproex  milliGRAM(s) Oral every 12 hours  divalproex  milliGRAM(s) Oral at bedtime  gabapentin 100 milliGRAM(s) Oral three times a day  heparin   Injectable 5000 Unit(s) SubCutaneous every 8 hours  lactated ringers. 1000 milliLiter(s) IV Continuous <Continuous>  levothyroxine 25 MICROGram(s) Oral daily  lidocaine   4% Patch 1 Patch Transdermal daily  lisinopril 10 milliGRAM(s) Oral daily  melatonin 5 milliGRAM(s) Oral at bedtime  memantine 5 milliGRAM(s) Oral daily  metoprolol tartrate 25 milliGRAM(s) Oral two times a day  pantoprazole    Tablet 40 milliGRAM(s) Oral before breakfast  senna 2 Tablet(s) Oral at bedtime      Allergies  No Known Allergies        T(C): 34 (11-22-22 @ 16:20), Max: 34 (11-22-22 @ 16:20)  HR: 82 (11-22-22 @ 15:29) (78 - 82)  BP: 175/82 (11-22-22 @ 15:29) (164/77 - 175/82)  RR: 20 (11-22-22 @ 15:29) (18 - 20)  SpO2: 97% (11-22-22 @ 15:29) (97% - 100%)    Physical Exam  NAD  Breathing comfortably on RA  Resting comfortably    RUE  skin intact   neg swelling   nttp over distal clavicle  No deformity  Motor: AIN/PIN/Ulnar intact  Sensory: Ax/M/R/U intact  Vasc: hand WWP, 2+ radial pulse      Labs                        9.8    6.76  )-----------( 191      ( 22 Nov 2022 10:40 )             29.5     11-22    146  |  107  |  27<H>  ----------------------------<  103<H>  6.0<HH>   |  27  |  1.3    Ca    8.9      22 Nov 2022 10:40    TPro  6.7  /  Alb  2.6<L>  /  TBili  1.2  /  DBili  x   /  AST  24  /  ALT  7   /  AlkPhos  82  11-22    LIVER FUNCTIONS - ( 22 Nov 2022 10:40 )  Alb: 2.6 g/dL / Pro: 6.7 g/dL / ALK PHOS: 82 U/L / ALT: 7 U/L / AST: 24 U/L / GGT: x           PT/INR - ( 22 Nov 2022 10:40 )   PT: 14.00 sec;   INR: 1.22 ratio         PTT - ( 22 Nov 2022 10:40 )  PTT:35.4 sec    Img  XR right shoulder, humerus: minimally displaced distal clavicle fracture    CT chest: minimally displaced distal clavicle fracture    A/P: Patient is a 89y year old Male with right minimally displaced clavicle fracture.    NWB RUE  Trauma consult for rib fxs  Neurosurgery consult for compression fxs  sling for comfort although patient nttp and not complaining of pain with ROM  Return to clinic: Orthopaedic office with Dr. Jeter, please call [phone] to schedule an appointment  Return to ED with uncontrolled pain/bleeding/fever/chills/numbness/tingling/cool extremity/inability to move extremity

## 2024-09-09 NOTE — OCCUPATIONAL THERAPY INITIAL EVALUATION ADULT - STANDING BALANCE: STATIC
Where Is Your Acne Located?: Face, chest, and back
fair minus
no abdominal pain, no bloating, no constipation, no diarrhea, no nausea and no vomiting.

## 2025-07-22 NOTE — PRE PROCEDURE NOTE - PROCEDURE SERVICE
Unable to reach patient for follow up call after recent hospitalization.   Left voicemail with call back number for patient to call if needed   If no voicemail available call attempts x 2 were made to contact the patient to assist with any questions or concerns patient may have.       Orthopedic Surgery